# Patient Record
Sex: MALE | Race: WHITE | NOT HISPANIC OR LATINO | Employment: OTHER | URBAN - METROPOLITAN AREA
[De-identification: names, ages, dates, MRNs, and addresses within clinical notes are randomized per-mention and may not be internally consistent; named-entity substitution may affect disease eponyms.]

---

## 2017-05-04 ENCOUNTER — ALLSCRIPTS OFFICE VISIT (OUTPATIENT)
Dept: OTHER | Facility: OTHER | Age: 72
End: 2017-05-04

## 2017-10-17 ENCOUNTER — ALLSCRIPTS OFFICE VISIT (OUTPATIENT)
Dept: OTHER | Facility: OTHER | Age: 72
End: 2017-10-17

## 2017-10-17 DIAGNOSIS — I42.9 CARDIOMYOPATHY (HCC): ICD-10-CM

## 2017-10-17 DIAGNOSIS — I10 ESSENTIAL (PRIMARY) HYPERTENSION: ICD-10-CM

## 2017-10-25 ENCOUNTER — TRANSCRIBE ORDERS (OUTPATIENT)
Dept: LAB | Facility: CLINIC | Age: 72
End: 2017-10-25

## 2017-10-25 ENCOUNTER — LAB (OUTPATIENT)
Dept: LAB | Facility: CLINIC | Age: 72
End: 2017-10-25
Payer: MEDICARE

## 2017-10-25 DIAGNOSIS — I42.9 CARDIOMYOPATHY (HCC): ICD-10-CM

## 2017-10-25 DIAGNOSIS — I10 ESSENTIAL (PRIMARY) HYPERTENSION: ICD-10-CM

## 2017-10-25 DIAGNOSIS — R73.01 IMPAIRED FASTING GLUCOSE: ICD-10-CM

## 2017-10-25 DIAGNOSIS — E78.2 MIXED HYPERLIPIDEMIA: Primary | ICD-10-CM

## 2017-10-25 DIAGNOSIS — D50.0 BLOOD LOSS ANEMIA: ICD-10-CM

## 2017-10-25 LAB
ALBUMIN SERPL BCP-MCNC: 4 G/DL (ref 3.5–5)
ALP SERPL-CCNC: 69 U/L (ref 46–116)
ALT SERPL W P-5'-P-CCNC: 18 U/L (ref 12–78)
ANION GAP SERPL CALCULATED.3IONS-SCNC: 10 MMOL/L (ref 4–13)
AST SERPL W P-5'-P-CCNC: 16 U/L (ref 5–45)
BASOPHILS # BLD AUTO: 0.03 THOUSANDS/ΜL (ref 0–0.1)
BASOPHILS NFR BLD AUTO: 1 % (ref 0–1)
BILIRUB SERPL-MCNC: 0.7 MG/DL (ref 0.2–1)
BUN SERPL-MCNC: 15 MG/DL (ref 5–25)
CALCIUM SERPL-MCNC: 9 MG/DL (ref 8.3–10.1)
CHLORIDE SERPL-SCNC: 102 MMOL/L (ref 100–108)
CHOLEST SERPL-MCNC: 198 MG/DL (ref 50–200)
CO2 SERPL-SCNC: 28 MMOL/L (ref 21–32)
CREAT SERPL-MCNC: 1.03 MG/DL (ref 0.6–1.3)
EOSINOPHIL # BLD AUTO: 0.28 THOUSAND/ΜL (ref 0–0.61)
EOSINOPHIL NFR BLD AUTO: 6 % (ref 0–6)
ERYTHROCYTE [DISTWIDTH] IN BLOOD BY AUTOMATED COUNT: 14.1 % (ref 11.6–15.1)
EST. AVERAGE GLUCOSE BLD GHB EST-MCNC: 114 MG/DL
GFR SERPL CREATININE-BSD FRML MDRD: 73 ML/MIN/1.73SQ M
GLUCOSE P FAST SERPL-MCNC: 113 MG/DL (ref 65–99)
HBA1C MFR BLD: 5.6 % (ref 4.2–6.3)
HCT VFR BLD AUTO: 38.4 % (ref 36.5–49.3)
HDLC SERPL-MCNC: 101 MG/DL (ref 40–60)
HGB BLD-MCNC: 13 G/DL (ref 12–17)
LDLC SERPL CALC-MCNC: 80 MG/DL (ref 0–100)
LYMPHOCYTES # BLD AUTO: 1.57 THOUSANDS/ΜL (ref 0.6–4.47)
LYMPHOCYTES NFR BLD AUTO: 33 % (ref 14–44)
MCH RBC QN AUTO: 32.9 PG (ref 26.8–34.3)
MCHC RBC AUTO-ENTMCNC: 33.9 G/DL (ref 31.4–37.4)
MCV RBC AUTO: 97 FL (ref 82–98)
MONOCYTES # BLD AUTO: 0.52 THOUSAND/ΜL (ref 0.17–1.22)
MONOCYTES NFR BLD AUTO: 11 % (ref 4–12)
NEUTROPHILS # BLD AUTO: 2.36 THOUSANDS/ΜL (ref 1.85–7.62)
NEUTS SEG NFR BLD AUTO: 49 % (ref 43–75)
NRBC BLD AUTO-RTO: 0 /100 WBCS
PLATELET # BLD AUTO: 164 THOUSANDS/UL (ref 149–390)
PMV BLD AUTO: 12.2 FL (ref 8.9–12.7)
POTASSIUM SERPL-SCNC: 4.3 MMOL/L (ref 3.5–5.3)
PROT SERPL-MCNC: 7.9 G/DL (ref 6.4–8.2)
RBC # BLD AUTO: 3.95 MILLION/UL (ref 3.88–5.62)
SODIUM SERPL-SCNC: 140 MMOL/L (ref 136–145)
TRIGL SERPL-MCNC: 84 MG/DL
WBC # BLD AUTO: 4.77 THOUSAND/UL (ref 4.31–10.16)

## 2017-10-25 PROCEDURE — 80061 LIPID PANEL: CPT

## 2017-10-25 PROCEDURE — 80053 COMPREHEN METABOLIC PANEL: CPT

## 2017-10-25 PROCEDURE — 85025 COMPLETE CBC W/AUTO DIFF WBC: CPT

## 2017-10-25 PROCEDURE — 83036 HEMOGLOBIN GLYCOSYLATED A1C: CPT

## 2017-10-25 PROCEDURE — 36415 COLL VENOUS BLD VENIPUNCTURE: CPT

## 2018-01-11 NOTE — CONSULTS
I had the pleasure of evaluating your patient, Celine Glez  My full evaluation follows:      Chief Complaint  Patient is here today for f/u  Patient c/o occ  SOB, occ Dizziness  Patient is here today for follow up of chronic conditions described in HPI  History of Present Illness  Mr Abdiaziz Marks is a 81year old man with non-ischemic cardiomyopathy returns for follow up  Dyspnea improved with Entresto  Has nasal congestion  No chest pain or palpitations  Review of Systems      Cardiac: No complaints of chest pain, no palpitations, no fainiting  Skin: No complaints of nonhealing sores or skin rash  Genitourinary: No complaints of recurrent urinary tract infections, frequent urination at night, difficult urination, blood in urine, kidney stones, loss of bladder control, no kidney or prostate problems, no erectile dysfunction  Psychological: No complaints of feeling depressed, anxiety, panic attacks, or difficulty concentrating  General: No complaints of trouble sleeping, lack of energy, fatigue, appetite changes, weight changes, fever, frequent infections, or night sweats  Respiratory: shortness of breath  HEENT: No complaints of serious problems, hearing problems, nose problems, throat problems, or snoring  Gastrointestinal: No complaints of liver problems, nausea, vomiting, heartburn, constipation, bloody stools, diarrhea, problems swallowing, adbominal pain, or rectal bleeding  Hematologic: No complaints of bleeding disorders, anemia, blood clots, or excessive brusing  Neurological: No complaints of numbness, tingling, dizziness, weakness, seizures, headaches, syncope or fainting, AM fatigue, daytime sleepiness, no witnessed apnea episodes  Musculoskeletal: No complaints of arthritis, back pain, or painfull swelling  Active Problems    1  Cardiomyopathy (425 4) (I42 9)   2  Dyspnea on exertion (786 09) (R06 09)   3  Hypertension (401 9) (I10)   4   Pre-operative cardiovascular examination (V72 81) (Z01 810)    Past Medical History    · History of syncope (V15 89) (Z87 898)    The active problems and past medical history were reviewed and updated today  Surgical History    The surgical history was reviewed and updated today  Family History    · Family history of Reported Family History Of Cancer    The family history was reviewed and updated today  Social History    · Current Every Day Smoker (305 1)   · Marital History - Currently    · Stopped Drinking Alcohol  The social history was reviewed and updated today  The social history was reviewed and is unchanged  Current Meds   1  Carvedilol 6 25 MG Oral Tablet; take 1 tablet twice daily as directed; Therapy: 91LHS7572 to (Evaluate:11Jan2018)  Requested for: 08UKS3838; Last   Rx:16Jan2017 Ordered   2  Entresto 24-26 MG Oral Tablet; Take 1 tablet twice daily; Therapy: 53TCS6171 to ([phone])  Requested for: 18MLX8836; Last   MC:[id number] Ordered   3  Furosemide 20 MG Oral Tablet; Take 1 tablet daily; Therapy: 10Aug2012 to (Evaluate:12Oct2018)  Requested for: 17Oct2017 Recorded    The medication list was reviewed and updated today  Allergies    1  No Known Drug Allergies    Vitals   Recorded: 19BIH8274 04:30PM   Heart Rate 80, R Radial   Systolic 075, LUE, Sitting   Diastolic 66, LUE, Sitting   Height 5 ft 8 in   Weight 166 lb 7 oz   BMI Calculated 25 31   BSA Calculated 1 89     Physical Exam    Constitutional   General appearance: No acute distress, well appearing and well nourished  Eyes   Conjunctiva and Sclera examination: Conjunctiva pink, sclera anicteric  Ears, Nose, Mouth, and Throat - External inspection of ears and nose: Normal without deformities or discharge  Neck   Neck and thyroid: Normal, supple, trachea midline, no thyromegaly  Pulmonary   Respiratory effort: No increased work of breathing or signs of respiratory distress      Cardiovascular   Auscultation of heart: Normal rate and rhythm, normal S1 and S2, no murmurs  Carotid pulses: Normal, 2+ bilaterally  Examination of extremities for edema and/or varicosities: Normal     Chest - Chest: Normal    Abdomen   Abdomen: Non-tender and no distention  Musculoskeletal Gait and station: Normal gait  Skin - Skin and subcutaneous tissue: Normal without rashes or lesions  Skin is warm and well perfused, normal turgor  Neurologic - Speech: Normal     Psychiatric - Orientation to person, place, and time: Normal  Mood and affect: Normal       Assessment    1  Cardiomyopathy (425 4) (I42 9)   2  Hypertension (401 9) (I10)    Plan  Cardiomyopathy, Hypertension    · (1) BASIC METABOLIC PROFILE; Status:Active; Requested [id number];    Perform:Located within [facility]; Due:21Yha0185; Ordered; For:Cardiomyopathy, Hypertension; Ordered By:Sanket Heath; Hypertension    · Follow-up visit in 6 months Evaluation and Treatment  Follow-up  Status: Complete   Done: 51THL7491   Ordered; For: Hypertension; Ordered By: Jeane Hook Performed:  Due: 94GQO5347; Last Updated By: Laveta Oppenheim; 10/17/2017 4:43:23 PM    Discussion/Summary    81year old man with non-ischemic cardiomyopathy returns for follow up  Dyspnea improved with Entresto  Has nasal congestion  No chest pain or palpitations  Impression:  1  Non-ischemic cardiomyopathy - patient with NYHA class III systolic heart failure  EF 25%  Not interested in ICD  2  Hypertension - controlled  3  Tobacco usage - still smokes several cigarettes a day  Recommendations:  1  Continue current medications  2  Check basic metabolic profile  3  Follow up in 6 months  Thank you very much for allowing me to participate in the care of this patient  If you have any questions, please do not hesitate to contact me        Signatures   Electronically signed by : PARVIN Umana ; Oct 17 2017  4:44PM EST                       (Author)

## 2018-01-14 VITALS
SYSTOLIC BLOOD PRESSURE: 118 MMHG | BODY MASS INDEX: 25.23 KG/M2 | HEART RATE: 80 BPM | DIASTOLIC BLOOD PRESSURE: 66 MMHG | WEIGHT: 166.44 LBS | HEIGHT: 68 IN

## 2018-01-14 VITALS
HEIGHT: 68 IN | DIASTOLIC BLOOD PRESSURE: 60 MMHG | WEIGHT: 171.56 LBS | SYSTOLIC BLOOD PRESSURE: 118 MMHG | HEART RATE: 93 BPM | BODY MASS INDEX: 26 KG/M2

## 2018-02-19 DIAGNOSIS — I42.8 NON-ISCHEMIC CARDIOMYOPATHY (HCC): Primary | ICD-10-CM

## 2018-04-13 ENCOUNTER — TRANSCRIBE ORDERS (OUTPATIENT)
Dept: LAB | Facility: CLINIC | Age: 73
End: 2018-04-13

## 2018-04-13 ENCOUNTER — APPOINTMENT (OUTPATIENT)
Dept: LAB | Facility: CLINIC | Age: 73
End: 2018-04-13
Payer: MEDICARE

## 2018-04-13 DIAGNOSIS — R73.01 IMPAIRED FASTING GLUCOSE: ICD-10-CM

## 2018-04-13 DIAGNOSIS — E78.2 MIXED HYPERLIPIDEMIA: Primary | ICD-10-CM

## 2018-04-13 DIAGNOSIS — E04.0 GOITER, SIMPLE: ICD-10-CM

## 2018-04-13 DIAGNOSIS — D50.0 IRON DEFICIENCY ANEMIA SECONDARY TO BLOOD LOSS (CHRONIC): ICD-10-CM

## 2018-04-13 LAB
ALBUMIN SERPL BCP-MCNC: 3.8 G/DL (ref 3.5–5)
ALP SERPL-CCNC: 69 U/L (ref 46–116)
ALT SERPL W P-5'-P-CCNC: 14 U/L (ref 12–78)
ANION GAP SERPL CALCULATED.3IONS-SCNC: 4 MMOL/L (ref 4–13)
AST SERPL W P-5'-P-CCNC: 13 U/L (ref 5–45)
BASOPHILS # BLD AUTO: 0.02 THOUSANDS/ΜL (ref 0–0.1)
BASOPHILS NFR BLD AUTO: 1 % (ref 0–1)
BILIRUB SERPL-MCNC: 0.84 MG/DL (ref 0.2–1)
BUN SERPL-MCNC: 23 MG/DL (ref 5–25)
CALCIUM SERPL-MCNC: 8.8 MG/DL
CHLORIDE SERPL-SCNC: 107 MMOL/L (ref 100–108)
CHOLEST SERPL-MCNC: 180 MG/DL (ref 50–200)
CO2 SERPL-SCNC: 31 MMOL/L (ref 21–32)
CREAT SERPL-MCNC: 1.05 MG/DL (ref 0.6–1.3)
EOSINOPHIL # BLD AUTO: 0.24 THOUSAND/ΜL (ref 0–0.61)
EOSINOPHIL NFR BLD AUTO: 6 % (ref 0–6)
ERYTHROCYTE [DISTWIDTH] IN BLOOD BY AUTOMATED COUNT: 14.7 % (ref 11.6–15.1)
EST. AVERAGE GLUCOSE BLD GHB EST-MCNC: 111 MG/DL
GFR SERPL CREATININE-BSD FRML MDRD: 71 ML/MIN/1.73SQ M
GLUCOSE P FAST SERPL-MCNC: 100 MG/DL (ref 65–99)
HBA1C MFR BLD: 5.5 % (ref 4.2–6.3)
HCT VFR BLD AUTO: 35.3 % (ref 36.5–49.3)
HDLC SERPL-MCNC: 77 MG/DL (ref 40–60)
HGB BLD-MCNC: 11.7 G/DL (ref 12–17)
LDLC SERPL CALC-MCNC: 90 MG/DL (ref 0–100)
LYMPHOCYTES # BLD AUTO: 1.41 THOUSANDS/ΜL (ref 0.6–4.47)
LYMPHOCYTES NFR BLD AUTO: 37 % (ref 14–44)
MCH RBC QN AUTO: 33.1 PG (ref 26.8–34.3)
MCHC RBC AUTO-ENTMCNC: 33.1 G/DL (ref 31.4–37.4)
MCV RBC AUTO: 100 FL (ref 82–98)
MONOCYTES # BLD AUTO: 0.45 THOUSAND/ΜL (ref 0.17–1.22)
MONOCYTES NFR BLD AUTO: 12 % (ref 4–12)
NEUTROPHILS # BLD AUTO: 1.67 THOUSANDS/ΜL (ref 1.85–7.62)
NEUTS SEG NFR BLD AUTO: 44 % (ref 43–75)
NONHDLC SERPL-MCNC: 103 MG/DL
NRBC BLD AUTO-RTO: 0 /100 WBCS
PLATELET # BLD AUTO: 142 THOUSANDS/UL (ref 149–390)
PMV BLD AUTO: 11.6 FL (ref 8.9–12.7)
POTASSIUM SERPL-SCNC: 4.3 MMOL/L (ref 3.5–5.3)
PROT SERPL-MCNC: 7.6 G/DL (ref 6.4–8.2)
RBC # BLD AUTO: 3.54 MILLION/UL (ref 3.88–5.62)
SODIUM SERPL-SCNC: 142 MMOL/L (ref 136–145)
TRIGL SERPL-MCNC: 63 MG/DL
TSH SERPL DL<=0.05 MIU/L-ACNC: 2.29 UIU/ML (ref 0.36–3.74)
WBC # BLD AUTO: 3.79 THOUSAND/UL (ref 4.31–10.16)

## 2018-04-13 PROCEDURE — 85025 COMPLETE CBC W/AUTO DIFF WBC: CPT

## 2018-04-13 PROCEDURE — 80061 LIPID PANEL: CPT

## 2018-04-13 PROCEDURE — 84443 ASSAY THYROID STIM HORMONE: CPT

## 2018-04-13 PROCEDURE — 80053 COMPREHEN METABOLIC PANEL: CPT

## 2018-04-13 PROCEDURE — 36415 COLL VENOUS BLD VENIPUNCTURE: CPT

## 2018-04-13 PROCEDURE — 83036 HEMOGLOBIN GLYCOSYLATED A1C: CPT

## 2018-04-17 ENCOUNTER — OFFICE VISIT (OUTPATIENT)
Dept: CARDIOLOGY CLINIC | Facility: CLINIC | Age: 73
End: 2018-04-17
Payer: MEDICARE

## 2018-04-17 VITALS
OXYGEN SATURATION: 93 % | SYSTOLIC BLOOD PRESSURE: 122 MMHG | BODY MASS INDEX: 24.71 KG/M2 | WEIGHT: 166.8 LBS | HEIGHT: 69 IN | HEART RATE: 96 BPM | DIASTOLIC BLOOD PRESSURE: 70 MMHG

## 2018-04-17 DIAGNOSIS — R06.02 SHORTNESS OF BREATH: Primary | ICD-10-CM

## 2018-04-17 DIAGNOSIS — I42.8 NON-ISCHEMIC CARDIOMYOPATHY (HCC): ICD-10-CM

## 2018-04-17 DIAGNOSIS — I10 ESSENTIAL HYPERTENSION: ICD-10-CM

## 2018-04-17 DIAGNOSIS — I42.8 NICM (NONISCHEMIC CARDIOMYOPATHY) (HCC): ICD-10-CM

## 2018-04-17 PROCEDURE — 99214 OFFICE O/P EST MOD 30 MIN: CPT | Performed by: INTERNAL MEDICINE

## 2018-04-17 PROCEDURE — 93000 ELECTROCARDIOGRAM COMPLETE: CPT | Performed by: INTERNAL MEDICINE

## 2018-04-17 RX ORDER — CARVEDILOL 6.25 MG/1
TABLET ORAL
COMMUNITY
Start: 2018-04-12 | End: 2018-11-05 | Stop reason: SDUPTHER

## 2018-04-17 RX ORDER — FUROSEMIDE 20 MG/1
TABLET ORAL
COMMUNITY
Start: 2018-04-12 | End: 2018-11-05 | Stop reason: SDUPTHER

## 2018-04-17 NOTE — PATIENT INSTRUCTIONS
Recommendations:  1  Increase Entresto to 49/51 mg 2x/day  2  Continue remainder of medications  3  Follow up in 6 months

## 2018-04-17 NOTE — PROGRESS NOTES
Cardiology   Geeta Torres 72 y o  male MRN: [id number]        Impression:  1  Non-ischemic cardiomyopathy - patient with NYHA class III systolic heart failure  EF 25%  Not interested in ICD  2  Hypertension - controlled  3  Tobacco usage - still smokes several cigarettes a day  Recommendations:  1  Increase Entresto to 49/51 mg 2x/day  2  Continue remainder of medications  3  Follow up in 6 months  HPI: Geeta Torres is a 72y o  year old male with non-ischemic cardiomyopathy returns for follow up  Dyspnea improved with Entresto, but still occasionally dyspneic  Review of Systems   Constitutional: Negative  HENT: Negative  Eyes: Negative  Respiratory: Positive for shortness of breath  Negative for chest tightness  Cardiovascular: Negative for chest pain, palpitations and leg swelling  Gastrointestinal: Negative  Endocrine: Negative  Genitourinary: Negative  Musculoskeletal: Negative  Skin: Negative  Allergic/Immunologic: Negative  Neurological: Negative  Hematological: Negative  Psychiatric/Behavioral: Negative  All other systems reviewed and are negative  Past Medical History:   Diagnosis Date    Cardiomyopathy [address] [address]     Dyspnea on exertion     Hypertension      History reviewed  No pertinent surgical history    History   Alcohol Use No     History   Drug Use No     History   Smoking Status    Current Every Day Smoker   Smokeless Tobacco    Never Used     Comment: two a day only      Family History   Problem Relation Age of Onset    No Known Problems Mother     No Known Problems Father     Cancer Family     Heart disease Maternal Uncle        Allergies:  No Known Allergies    Medications:     Current Outpatient Prescriptions:     carvedilol (COREG) 6 25 mg tablet, , Disp: , Rfl:     furosemide (LASIX) 20 mg tablet, , Disp: , Rfl:     sacubitril-valsartan (ENTRESTO) 49-51 MG TABS, Take 1 tablet by mouth 2 (two) times a day, Disp: 180 tablet, Rfl: 3      Wt Readings from Last 3 Encounters:   04/17/18 75 7 kg (166 lb 12 8 oz)   10/17/17 75 5 kg (166 lb 7 oz)   05/04/17 77 8 kg (171 lb 8 9 oz)     Temp Readings from Last 3 Encounters:   No data found for Temp     BP Readings from Last 3 Encounters:   04/17/18 122/70   10/17/17 118/66   05/04/17 118/60     Pulse Readings from Last 3 Encounters:   04/17/18 96   10/17/17 80   05/04/17 93         Physical Exam   Constitutional: He is oriented to person, place, and time  He appears well-developed  HENT:   Head: Normocephalic  Eyes: EOM are normal    Neck: Normal range of motion  Cardiovascular: Normal rate, regular rhythm and normal heart sounds  Exam reveals no gallop and no friction rub  No murmur heard  Pulmonary/Chest: Effort normal and breath sounds normal  No respiratory distress  He has no wheezes  He has no rales  Abdominal: Soft  Musculoskeletal: Normal range of motion  Neurological: He is alert and oriented to person, place, and time  Skin: Skin is warm and dry  Psychiatric: He has a normal mood and affect           Laboratory Studies:  CMP:  Lab Results   Component Value Date     04/13/2018    K 4 3 04/13/2018     04/13/2018    CO2 31 04/13/2018    ANIONGAP 4 04/13/2018    BUN 23 04/13/2018    CREATININE 1 05 04/13/2018    GLUCOSE 93 12/06/2016    AST 13 04/13/2018    ALT 14 04/13/2018    BILITOT 0 84 04/13/2018    EGFR 71 04/13/2018       Lipid Profile:   Lab Results   Component Value Date    CHOL 180 04/13/2018     Lab Results   Component Value Date    HDL 77 (H) 04/13/2018     Lab Results   Component Value Date    LDLCALC 90 04/13/2018     Lab Results   Component Value Date    TRIG 63 04/13/2018       Cardiac testing:   EKG reviewed personally: YOVANY Alfaro PAC IVCD  Results for orders placed in visit on 11/17/14   Echo complete with contrast if indicated    Narrative Amanda [address], [address] [facility]   Phone: [phone] TRANSTHORACIC ECHOCARDIOGRAM   2D, M-MODE, DOPPLER, AND COLOR DOPPLER   Study date:  2014   Patient: Gleda Eisenmenger   MR number: [id number]   Account number: [de-identified]   : 1945   Age: 81 years   Gender: Male   Status: Outpatient   Location: [address] [place] [address]   Height: 69 in   Weight: 179 5 lb   BP: 124/ 78 mmHg   Indications: Assess left ventricular function  Diagnoses: 425 8 - CARDIOMYOPATH IN OTH DIS   Sonographer:  LENIN Harris   Primary Physician:  Daysi Lynne DO   Referring Physician:  Favio Lockett MD   Group:  [facility]   Interpreting Physician:  Mel Love DO   SUMMARY   LEFT VENTRICLE:   The ventricle was mildly dilated when indexed for BSA  Systolic function was markedly reduced  Ejection fraction was estimated to be   20 %  There was severe diffuse hypokinesis with no identifiable regional variations  Mass was definitely increased  The changes were consistent with eccentric   hypertrophy  Left ventricular diastolic function parameters were abnormal    Doppler parameters were consistent with high ventricular filling pressure  RIGHT VENTRICLE:   The size was normal    Systolic function was normal    LEFT ATRIUM:   The atrium was mildly dilated  MITRAL VALVE:   There was mild regurgitation  TRICUSPID VALVE:   TRANSTHORACIC ECHOCARDIOGRAM   Patient: Gleda Eisenmenger   MR number: [id number]    ------ Page 2   There was mild regurgitation  HISTORY: PRIOR HISTORY: Nonischemic cardiomyopathy  Risk factors: hypertension   and a history of current cigarette use (within the last month)  PROCEDURE: The study was performed in the [address] [place] [address]  This was a routine study  The transthoracic approach was used  The study   included complete 2D imaging, M-mode, complete spectral Doppler, and color   Doppler   Images were obtained from the parasternal, apical, subcostal, and   suprasternal notch acoustic windows  Image quality was adequate  LEFT VENTRICLE: The ventricle was mildly dilated when indexed for BSA  Systolic   function was markedly reduced  Ejection fraction was estimated to be 20 %  There was severe diffuse hypokinesis with no identifiable regional variations  Wall thickness was normal  Mass was definitely increased  The changes were   consistent with eccentric hypertrophy  DOPPLER: Left ventricular diastolic   function parameters were abnormal  Doppler parameters were consistent with    high   ventricular filling pressure  RIGHT VENTRICLE: The size was normal  Systolic function was normal  Wall   thickness was normal    LEFT ATRIUM: The atrium was mildly dilated  RIGHT ATRIUM: Size was normal    MITRAL VALVE: Valve structure was normal  There was normal leaflet separation  DOPPLER: The transmitral velocity was within the normal range  There was no   evidence for stenosis  There was mild regurgitation  AORTIC VALVE: The valve was trileaflet  Leaflets exhibited normal thickness    and   normal cuspal separation  DOPPLER: Transaortic velocity was within the normal   range  There was no evidence for stenosis  There was no regurgitation  TRICUSPID VALVE: The valve structure was normal  There was normal leaflet   separation  DOPPLER: The transtricuspid velocity was within the normal range  There was no evidence for stenosis  There was mild regurgitation  Pulmonary   artery systolic pressure was within the normal range  PULMONIC VALVE: Leaflets exhibited normal thickness, no calcification, and   normal cuspal separation  DOPPLER: The transpulmonic velocity was within the   normal range  There was trace regurgitation  PERICARDIUM: There was no pericardial effusion  The pericardium was normal in   appearance  AORTA: The root exhibited normal size  SYSTEMIC VEINS: IVC: The inferior vena cava was normal in size and course     Respirophasic changes were normal    TRANSTHORACIC ECHOCARDIOGRAM   Patient: Fernando Bazan   MR number: [id number]    ------ Page 3   SYSTEM MEASUREMENT TABLES   2D   %FS: 5 72 %   AV Diam: 3 46 cm   EDV(Teich): 207 4 ml   EF Biplane: 23 45 %   EF(Cube): 16 21 %   EF(Teich): 12 57 %   ESV(Cube): 218 09 ml   ESV(Teich): 181 33 ml   IVSd: 0 97 cm   LA Area: 20 73 cm2   LA Diam: 3 74 cm   LVEDV MOD A2C: 199 07 ml   LVEDV MOD A4C: 177 42 ml   LVEDV MOD BP: 188 39 ml   LVEF MOD A2C: 18 35 %   LVEF MOD A4C: 33 44 %   LVESV MOD A2C: 162 53 ml   LVESV MOD A4C: 118 09 ml   LVESV MOD BP: 144 2 ml   LVIDd: 6 38 cm   LVIDs: 6 02 cm   LVLd A2C: 9 82 cm   LVLd A4C: 9 94 cm   LVLs A2C: 9 18 cm   LVLs A4C: 8 46 cm   LVPWd: 1 02 cm   RA Area: 14 14 cm2   RV Diam: 3 09 cm   SI(Cube): 21 31 ml/m2   SI(Teich): 13 17 ml/m2   SV MOD A2C: 36 53 ml   SV MOD A4C: 59 32 ml   SV(Cube): 42 19 ml   SV(Teich): 26 08 ml   CW   TR MaxP 36 mmHg   TR Vmax: 2 47 m/s   MM   TAPSE: 2 64 cm   PW   E': 0 04 m/s   E/E': 23 83   MV A Anupam: 0 97 m/s   TRANSTHORACIC ECHOCARDIOGRAM   Patient: Fernando Bazan   MR number: [id number]    ------ Page 4   MV Dec Mercer: 5 98 m/s2   MV DecT: 144 47 ms   MV E Anupam: 0 86 m/s   MV E/A Ratio: 0 89   [facility]   Prepared and electronically signed by   Elena Mcclelland DO   Signed [id number] 63:58:91

## 2018-05-09 ENCOUNTER — APPOINTMENT (OUTPATIENT)
Dept: LAB | Facility: CLINIC | Age: 73
End: 2018-05-09
Payer: MEDICARE

## 2018-05-09 ENCOUNTER — TRANSCRIBE ORDERS (OUTPATIENT)
Dept: LAB | Facility: CLINIC | Age: 73
End: 2018-05-09

## 2018-05-09 DIAGNOSIS — D50.0 IRON DEFICIENCY ANEMIA SECONDARY TO BLOOD LOSS (CHRONIC): Primary | ICD-10-CM

## 2018-05-09 LAB
BASOPHILS # BLD AUTO: 0.02 THOUSANDS/ΜL (ref 0–0.1)
BASOPHILS NFR BLD AUTO: 1 % (ref 0–1)
EOSINOPHIL # BLD AUTO: 0.21 THOUSAND/ΜL (ref 0–0.61)
EOSINOPHIL NFR BLD AUTO: 5 % (ref 0–6)
ERYTHROCYTE [DISTWIDTH] IN BLOOD BY AUTOMATED COUNT: 14.3 % (ref 11.6–15.1)
HCT VFR BLD AUTO: 36.6 % (ref 36.5–49.3)
HGB BLD-MCNC: 11.7 G/DL (ref 12–17)
LYMPHOCYTES # BLD AUTO: 1.32 THOUSANDS/ΜL (ref 0.6–4.47)
LYMPHOCYTES NFR BLD AUTO: 32 % (ref 14–44)
MCH RBC QN AUTO: 32.5 PG (ref 26.8–34.3)
MCHC RBC AUTO-ENTMCNC: 32 G/DL (ref 31.4–37.4)
MCV RBC AUTO: 102 FL (ref 82–98)
MONOCYTES # BLD AUTO: 0.36 THOUSAND/ΜL (ref 0.17–1.22)
MONOCYTES NFR BLD AUTO: 9 % (ref 4–12)
NEUTROPHILS # BLD AUTO: 2.15 THOUSANDS/ΜL (ref 1.85–7.62)
NEUTS SEG NFR BLD AUTO: 53 % (ref 43–75)
NRBC BLD AUTO-RTO: 0 /100 WBCS
PLATELET # BLD AUTO: 151 THOUSANDS/UL (ref 149–390)
PMV BLD AUTO: 11.7 FL (ref 8.9–12.7)
RBC # BLD AUTO: 3.6 MILLION/UL (ref 3.88–5.62)
WBC # BLD AUTO: 4.07 THOUSAND/UL (ref 4.31–10.16)

## 2018-05-09 PROCEDURE — 85025 COMPLETE CBC W/AUTO DIFF WBC: CPT

## 2018-05-09 PROCEDURE — 36415 COLL VENOUS BLD VENIPUNCTURE: CPT

## 2018-10-10 ENCOUNTER — APPOINTMENT (OUTPATIENT)
Dept: LAB | Facility: CLINIC | Age: 73
End: 2018-10-10
Payer: MEDICARE

## 2018-10-10 ENCOUNTER — TRANSCRIBE ORDERS (OUTPATIENT)
Dept: LAB | Facility: CLINIC | Age: 73
End: 2018-10-10

## 2018-10-10 DIAGNOSIS — R74.02 NONSPECIFIC ELEVATION OF LEVELS OF TRANSAMINASE OR LACTIC ACID DEHYDROGENASE (LDH): Primary | ICD-10-CM

## 2018-10-10 DIAGNOSIS — D53.9 SIMPLE CHRONIC ANEMIA: ICD-10-CM

## 2018-10-10 DIAGNOSIS — E03.4 IDIOPATHIC ATROPHIC HYPOTHYROIDISM: ICD-10-CM

## 2018-10-10 DIAGNOSIS — R74.01 NONSPECIFIC ELEVATION OF LEVELS OF TRANSAMINASE OR LACTIC ACID DEHYDROGENASE (LDH): Primary | ICD-10-CM

## 2018-10-10 DIAGNOSIS — D50.0 IRON DEFICIENCY ANEMIA SECONDARY TO BLOOD LOSS (CHRONIC): ICD-10-CM

## 2018-10-10 DIAGNOSIS — D51.9 ANEMIA DUE TO VITAMIN B12 DEFICIENCY, UNSPECIFIED B12 DEFICIENCY TYPE: ICD-10-CM

## 2018-10-10 LAB
ALBUMIN SERPL BCP-MCNC: 3.8 G/DL (ref 3.5–5)
ALP SERPL-CCNC: 64 U/L (ref 46–116)
ALT SERPL W P-5'-P-CCNC: 18 U/L (ref 12–78)
ANION GAP SERPL CALCULATED.3IONS-SCNC: 1 MMOL/L (ref 4–13)
AST SERPL W P-5'-P-CCNC: 13 U/L (ref 5–45)
BASOPHILS # BLD AUTO: 0.03 THOUSANDS/ΜL (ref 0–0.1)
BASOPHILS NFR BLD AUTO: 1 % (ref 0–1)
BILIRUB SERPL-MCNC: 0.63 MG/DL (ref 0.2–1)
BUN SERPL-MCNC: 18 MG/DL (ref 5–25)
CALCIUM SERPL-MCNC: 9.1 MG/DL (ref 8.3–10.1)
CHLORIDE SERPL-SCNC: 107 MMOL/L (ref 100–108)
CO2 SERPL-SCNC: 31 MMOL/L (ref 21–32)
CREAT SERPL-MCNC: 1.18 MG/DL (ref 0.6–1.3)
EOSINOPHIL # BLD AUTO: 0.3 THOUSAND/ΜL (ref 0–0.61)
EOSINOPHIL NFR BLD AUTO: 7 % (ref 0–6)
ERYTHROCYTE [DISTWIDTH] IN BLOOD BY AUTOMATED COUNT: 14.1 % (ref 11.6–15.1)
FERRITIN SERPL-MCNC: 124 NG/ML (ref 8–388)
FOLATE SERPL-MCNC: 16.9 NG/ML (ref 3.1–17.5)
GFR SERPL CREATININE-BSD FRML MDRD: 62 ML/MIN/1.73SQ M
GLUCOSE P FAST SERPL-MCNC: 105 MG/DL (ref 65–99)
HCT VFR BLD AUTO: 38.7 % (ref 36.5–49.3)
HGB BLD-MCNC: 12.6 G/DL (ref 12–17)
IMM GRANULOCYTES # BLD AUTO: 0 THOUSAND/UL (ref 0–0.2)
IMM GRANULOCYTES NFR BLD AUTO: 0 % (ref 0–2)
LYMPHOCYTES # BLD AUTO: 1.34 THOUSANDS/ΜL (ref 0.6–4.47)
LYMPHOCYTES NFR BLD AUTO: 29 % (ref 14–44)
MCH RBC QN AUTO: 34.1 PG (ref 26.8–34.3)
MCHC RBC AUTO-ENTMCNC: 32.6 G/DL (ref 31.4–37.4)
MCV RBC AUTO: 105 FL (ref 82–98)
MONOCYTES # BLD AUTO: 0.59 THOUSAND/ΜL (ref 0.17–1.22)
MONOCYTES NFR BLD AUTO: 13 % (ref 4–12)
NEUTROPHILS # BLD AUTO: 2.31 THOUSANDS/ΜL (ref 1.85–7.62)
NEUTS SEG NFR BLD AUTO: 50 % (ref 43–75)
NRBC BLD AUTO-RTO: 0 /100 WBCS
PLATELET # BLD AUTO: 144 THOUSANDS/UL (ref 149–390)
PMV BLD AUTO: 11.5 FL (ref 8.9–12.7)
POTASSIUM SERPL-SCNC: 5.5 MMOL/L (ref 3.5–5.3)
PROT SERPL-MCNC: 7.7 G/DL (ref 6.4–8.2)
RBC # BLD AUTO: 3.7 MILLION/UL (ref 3.88–5.62)
SODIUM SERPL-SCNC: 139 MMOL/L (ref 136–145)
TSH SERPL DL<=0.05 MIU/L-ACNC: 1.9 UIU/ML (ref 0.36–3.74)
VIT B12 SERPL-MCNC: 305 PG/ML (ref 100–900)
WBC # BLD AUTO: 4.57 THOUSAND/UL (ref 4.31–10.16)

## 2018-10-10 PROCEDURE — 84443 ASSAY THYROID STIM HORMONE: CPT

## 2018-10-10 PROCEDURE — 85025 COMPLETE CBC W/AUTO DIFF WBC: CPT

## 2018-10-10 PROCEDURE — 82607 VITAMIN B-12: CPT

## 2018-10-10 PROCEDURE — 82728 ASSAY OF FERRITIN: CPT

## 2018-10-10 PROCEDURE — 82746 ASSAY OF FOLIC ACID SERUM: CPT

## 2018-10-10 PROCEDURE — 80053 COMPREHEN METABOLIC PANEL: CPT

## 2018-10-10 PROCEDURE — 36415 COLL VENOUS BLD VENIPUNCTURE: CPT

## 2018-10-15 ENCOUNTER — APPOINTMENT (OUTPATIENT)
Dept: LAB | Facility: CLINIC | Age: 73
End: 2018-10-15
Payer: MEDICARE

## 2018-10-15 ENCOUNTER — TRANSCRIBE ORDERS (OUTPATIENT)
Dept: LAB | Facility: CLINIC | Age: 73
End: 2018-10-15

## 2018-10-15 DIAGNOSIS — R74.02 NONSPECIFIC ELEVATION OF LEVELS OF TRANSAMINASE OR LACTIC ACID DEHYDROGENASE (LDH): Primary | ICD-10-CM

## 2018-10-15 DIAGNOSIS — D53.9 SIMPLE CHRONIC ANEMIA: ICD-10-CM

## 2018-10-15 DIAGNOSIS — R74.01 NONSPECIFIC ELEVATION OF LEVELS OF TRANSAMINASE OR LACTIC ACID DEHYDROGENASE (LDH): Primary | ICD-10-CM

## 2018-10-15 DIAGNOSIS — D50.0 IRON DEFICIENCY ANEMIA SECONDARY TO BLOOD LOSS (CHRONIC): ICD-10-CM

## 2018-10-15 DIAGNOSIS — I51.9 MYXEDEMA HEART DISEASE: ICD-10-CM

## 2018-10-15 DIAGNOSIS — D51.9 ANEMIA DUE TO VITAMIN B12 DEFICIENCY, UNSPECIFIED B12 DEFICIENCY TYPE: ICD-10-CM

## 2018-10-15 DIAGNOSIS — E03.9 MYXEDEMA HEART DISEASE: ICD-10-CM

## 2018-10-15 LAB — HEMOCCULT STL QL IA: NEGATIVE

## 2018-10-15 PROCEDURE — G0328 FECAL BLOOD SCRN IMMUNOASSAY: HCPCS

## 2018-10-16 ENCOUNTER — OFFICE VISIT (OUTPATIENT)
Dept: CARDIOLOGY CLINIC | Facility: CLINIC | Age: 73
End: 2018-10-16
Payer: MEDICARE

## 2018-10-16 VITALS
SYSTOLIC BLOOD PRESSURE: 114 MMHG | WEIGHT: 162 LBS | DIASTOLIC BLOOD PRESSURE: 68 MMHG | HEART RATE: 99 BPM | BODY MASS INDEX: 23.99 KG/M2 | OXYGEN SATURATION: 94 % | HEIGHT: 69 IN

## 2018-10-16 DIAGNOSIS — I42.8 NICM (NONISCHEMIC CARDIOMYOPATHY) (HCC): ICD-10-CM

## 2018-10-16 DIAGNOSIS — R06.00 DYSPNEA ON EXERTION: ICD-10-CM

## 2018-10-16 DIAGNOSIS — I10 ESSENTIAL HYPERTENSION: Primary | ICD-10-CM

## 2018-10-16 PROCEDURE — 99214 OFFICE O/P EST MOD 30 MIN: CPT | Performed by: INTERNAL MEDICINE

## 2018-10-16 NOTE — PROGRESS NOTES
Cardiology   Carter Pelletier 72 y o  male MRN: [id number]        IImpression:  1  Non-ischemic cardiomyopathy - patient with NYHA class III systolic heart failure  EF 25%  Not interested in ICD  2  Hypertension - controlled  3  Tobacco usage - still smokes several cigarettes a day        Recommendations: 1  Continue current medications  2  Check chest x-ray  3  Check Pulmonary function test   4  Follow up in 2 months  HPI: Carter Pelletier is a 72y o  year old male with non-ischemic cardiomyopathy returns for follow up  Continues to feel dyspneic with any exertion  Review of Systems   Constitutional: Negative  HENT: Negative  Eyes: Negative  Respiratory: Positive for shortness of breath  Negative for chest tightness  Cardiovascular: Negative for chest pain, palpitations and leg swelling  Gastrointestinal: Negative  Endocrine: Negative  Genitourinary: Negative  Musculoskeletal: Negative  Skin: Negative  Allergic/Immunologic: Negative  Neurological: Negative  Hematological: Negative  Psychiatric/Behavioral: Negative  All other systems reviewed and are negative  Past Medical History:   Diagnosis Date    [facility]omyopathy ([facility] )     Dyspnea on exertion     Essential hypertension     Non-ischemic cardiomyopathy ([facility] )      History reviewed  No pertinent surgical history    History   Alcohol Use No     History   Drug Use No     History   Smoking Status    Current Every Day Smoker   Smokeless Tobacco    Never Used     Comment: two a day only      Family History   Problem Relation Age of Onset    No Known Problems Mother     No Known Problems Father     Cancer Family     Heart disease Maternal Uncle        Allergies:  No Known Allergies    Medications:     Current Outpatient Prescriptions:     carvedilol (COREG) 6 25 mg tablet, , Disp: , Rfl:     furosemide (LASIX) 20 mg tablet, , Disp: , Rfl:     sacubitril-valsartan (ENTRESTO) 49-51 MG TABS, Take 1 tablet by mouth 2 (two) times a day, Disp: 180 tablet, Rfl: 3      Wt Readings from Last 3 Encounters:   10/16/18 73 5 kg (162 lb)   04/17/18 75 7 kg (166 lb 12 8 oz)   10/17/17 75 5 kg (166 lb 7 oz)     Temp Readings from Last 3 Encounters:   No data found for Temp     BP Readings from Last 3 Encounters:   10/16/18 114/68   04/17/18 122/70   10/17/17 118/66     Pulse Readings from Last 3 Encounters:   10/16/18 99   04/17/18 96   10/17/17 80         Physical Exam   Constitutional: He is oriented to person, place, and time  He appears well-developed  HENT:   Head: Atraumatic  Eyes: EOM are normal    Neck: Normal range of motion  Cardiovascular: Normal rate, regular rhythm and normal heart sounds  Exam reveals no gallop and no friction rub  No murmur heard  Pulmonary/Chest: Effort normal and breath sounds normal  No respiratory distress  He has no wheezes  He has no rales  Abdominal: Soft  Musculoskeletal: Normal range of motion  Neurological: He is alert and oriented to person, place, and time  Skin: Skin is warm and dry  Psychiatric: He has a normal mood and affect           Laboratory Studies:  CMP:  Lab Results   Component Value Date     10/10/2018    K 5 5 (H) 10/10/2018     10/10/2018    CO2 31 10/10/2018    ANIONGAP 3 (L) 10/22/2015    BUN 18 10/10/2018    CREATININE 1 18 10/10/2018    GLUCOSE 100 10/22/2015    AST 13 10/10/2018    ALT 18 10/10/2018    BILITOT 0 69 10/22/2015    EGFR 62 10/10/2018       Lipid Profile:   Lab Results   Component Value Date    CHOL 190 10/22/2015     Lab Results   Component Value Date    HDL 77 (H) 04/13/2018     Lab Results   Component Value Date    LDLCALC 90 04/13/2018     Lab Results   Component Value Date    TRIG 63 04/13/2018

## 2018-10-16 NOTE — PATIENT INSTRUCTIONS
Recommendations: 1  Continue current medications  2  Check chest x-ray  3  Check Pulmonary function test   4  Follow up in 2 months

## 2018-10-22 ENCOUNTER — APPOINTMENT (OUTPATIENT)
Dept: RADIOLOGY | Facility: CLINIC | Age: 73
End: 2018-10-22
Payer: MEDICARE

## 2018-10-22 ENCOUNTER — HOSPITAL ENCOUNTER (OUTPATIENT)
Dept: PULMONOLOGY | Facility: HOSPITAL | Age: 73
Discharge: HOME | End: 2018-10-22
Attending: INTERNAL MEDICINE
Payer: MEDICARE

## 2018-10-22 DIAGNOSIS — R06.00 DYSPNEA ON EXERTION: ICD-10-CM

## 2018-10-22 PROCEDURE — 94729 DIFFUSING CAPACITY: CPT | Performed by: INTERNAL MEDICINE

## 2018-10-22 PROCEDURE — 94760 N-INVAS EAR/PLS OXIMETRY 1: CPT

## 2018-10-22 PROCEDURE — 94726 PLETHYSMOGRAPHY LUNG VOLUMES: CPT

## 2018-10-22 PROCEDURE — 94729 DIFFUSING CAPACITY: CPT

## 2018-10-22 PROCEDURE — 94726 PLETHYSMOGRAPHY LUNG VOLUMES: CPT | Performed by: INTERNAL MEDICINE

## 2018-10-22 PROCEDURE — 71046 X-RAY EXAM CHEST 2 VIEWS: CPT

## 2018-10-22 PROCEDURE — 94010 BREATHING CAPACITY TEST: CPT

## 2018-10-22 PROCEDURE — 94010 BREATHING CAPACITY TEST: CPT | Performed by: INTERNAL MEDICINE

## 2018-10-30 ENCOUNTER — TELEPHONE (OUTPATIENT)
Dept: CARDIOLOGY CLINIC | Facility: CLINIC | Age: 73
End: 2018-10-30

## 2018-10-30 DIAGNOSIS — J44.9 CHRONIC OBSTRUCTIVE PULMONARY DISEASE, UNSPECIFIED COPD TYPE (HCC): Primary | ICD-10-CM

## 2018-10-30 NOTE — PROGRESS NOTES
Spoke with patient, advised as directed  Patient was then xfered to [facility] in pulmonary for scheduling with reasoning for visit

## 2018-10-30 NOTE — TELEPHONE ENCOUNTER
Spoke with patient advised as directed, call was then xfered to Josselyn Delgado with pulmonary for scheduling visit and advised of reasoning for visit with pulmonary

## 2018-11-01 ENCOUNTER — APPOINTMENT (OUTPATIENT)
Dept: LAB | Facility: CLINIC | Age: 73
End: 2018-11-01
Payer: MEDICARE

## 2018-11-01 ENCOUNTER — TRANSCRIBE ORDERS (OUTPATIENT)
Dept: LAB | Facility: CLINIC | Age: 73
End: 2018-11-01

## 2018-11-01 DIAGNOSIS — N18.2 CHRONIC KIDNEY DISEASE, STAGE II (MILD): Primary | ICD-10-CM

## 2018-11-01 LAB
ANION GAP SERPL CALCULATED.3IONS-SCNC: 3 MMOL/L (ref 4–13)
BUN SERPL-MCNC: 16 MG/DL (ref 5–25)
CALCIUM SERPL-MCNC: 8.9 MG/DL (ref 8.3–10.1)
CHLORIDE SERPL-SCNC: 104 MMOL/L (ref 100–108)
CO2 SERPL-SCNC: 30 MMOL/L (ref 21–32)
CREAT SERPL-MCNC: 1.05 MG/DL (ref 0.6–1.3)
GFR SERPL CREATININE-BSD FRML MDRD: 71 ML/MIN/1.73SQ M
GLUCOSE P FAST SERPL-MCNC: 98 MG/DL (ref 65–99)
POTASSIUM SERPL-SCNC: 4.8 MMOL/L (ref 3.5–5.3)
SODIUM SERPL-SCNC: 137 MMOL/L (ref 136–145)

## 2018-11-01 PROCEDURE — 36415 COLL VENOUS BLD VENIPUNCTURE: CPT

## 2018-11-01 PROCEDURE — 80048 BASIC METABOLIC PNL TOTAL CA: CPT

## 2018-11-05 DIAGNOSIS — I42.9 CARDIOMYOPATHY, UNSPECIFIED TYPE (HCC): ICD-10-CM

## 2018-11-05 DIAGNOSIS — I10 HYPERTENSION, UNSPECIFIED TYPE: Primary | ICD-10-CM

## 2018-11-05 RX ORDER — CARVEDILOL 6.25 MG/1
6.25 TABLET ORAL 2 TIMES DAILY WITH MEALS
Qty: 180 TABLET | Refills: 1 | Status: SHIPPED | OUTPATIENT
Start: 2018-11-05 | End: 2019-03-27 | Stop reason: SDUPTHER

## 2018-11-05 RX ORDER — FUROSEMIDE 20 MG/1
20 TABLET ORAL DAILY
Qty: 90 TABLET | Refills: 1 | Status: SHIPPED | OUTPATIENT
Start: 2018-11-05 | End: 2019-03-07 | Stop reason: SDUPTHER

## 2018-11-05 NOTE — TELEPHONE ENCOUNTER
Received fax from [facility] requesting refills for:    Carvedilol 6 25mg one tablet BID  Furosemide 20mg once daily    Verified pharmacy choice with pt      Last OV 10/16/2018, next OV 12/18/2018

## 2018-12-18 ENCOUNTER — OFFICE VISIT (OUTPATIENT)
Dept: CARDIOLOGY CLINIC | Facility: CLINIC | Age: 73
End: 2018-12-18
Payer: MEDICARE

## 2018-12-18 VITALS
DIASTOLIC BLOOD PRESSURE: 60 MMHG | HEART RATE: 87 BPM | OXYGEN SATURATION: 95 % | BODY MASS INDEX: 24.26 KG/M2 | SYSTOLIC BLOOD PRESSURE: 110 MMHG | WEIGHT: 163.8 LBS | HEIGHT: 69 IN

## 2018-12-18 DIAGNOSIS — I10 ESSENTIAL HYPERTENSION: Primary | ICD-10-CM

## 2018-12-18 DIAGNOSIS — I42.8 NICM (NONISCHEMIC CARDIOMYOPATHY) (HCC): ICD-10-CM

## 2018-12-18 DIAGNOSIS — I63.9 CEREBROVASCULAR ACCIDENT (CVA), UNSPECIFIED MECHANISM (HCC): ICD-10-CM

## 2018-12-18 PROCEDURE — 99214 OFFICE O/P EST MOD 30 MIN: CPT | Performed by: INTERNAL MEDICINE

## 2018-12-18 RX ORDER — ASPIRIN 81 MG/1
81 TABLET ORAL DAILY
Qty: 90 TABLET | Refills: 3
Start: 2018-12-18 | End: 2019-03-07 | Stop reason: SDDI

## 2018-12-18 NOTE — PROGRESS NOTES
Cardiology   Alma Rosa Edwards 72 y o  male MRN: [id number]        IImpression:  1  Non-ischemic cardiomyopathy - patient with NYHA class III systolic heart failure  EF 25%  Not interested in ICD  2  Hypertension - controlled  3  Tobacco usage - still smokes several cigarettes a day  4  COPD - scheduled to see pulmonologist    5  Word finding difficulties - concerned about possible CVA several weeks ago           Recommendations:  1  Start aspirin 81mg daily  2  Start Clopidogrel 75mg daily if no evidence of intracranial hemorrhage  3  Continue remainder of medications  4  Check carotid u/s to evaluate for carotid artery disease  5  Check echocardiogram to evaluate for LV thrombus  6  Check Head CT to evaluate for CVA  7  Refer to Neurology  8  Follow up in 2 weeks  HPI: Alma Rosa Edwards is a 72y o  year old male with non-ischemic cardiomyopathy returns for follow up  Pulmonary function test demonstrated significant obstructive airflow defect  Is scheduled to see pulmonologist   For past several weeks, has had word finding difficulties  Has not relayed these symptoms to any other health care professional   He has never had these issues previously  Does feel generalized weakness  Review of Systems   Constitutional: Negative  HENT: Negative  Eyes: Negative  Respiratory: Negative for chest tightness and shortness of breath  Cardiovascular: Negative for chest pain, palpitations and leg swelling  Gastrointestinal: Negative  Endocrine: Negative  Genitourinary: Negative  Musculoskeletal: Negative  Skin: Negative  Allergic/Immunologic: Negative  Neurological: Positive for speech difficulty  Hematological: Negative  Psychiatric/Behavioral: Negative  All other systems reviewed and are negative          Past Medical History:   Diagnosis Date    Cardiomyopathy ([address] )     Dyspnea on exertion     Essential hypertension     Non-ischemic cardiomyopathy (HCC) History reviewed  No pertinent surgical history  History   Alcohol Use No     History   Drug Use No     History   Smoking Status    Current Every Day Smoker   Smokeless Tobacco    Never Used     Comment: two a day only      Family History   Problem Relation Age of Onset    No Known Problems Mother     No Known Problems Father     Cancer Family     Heart disease Maternal Uncle        Allergies:  No Known Allergies    Medications:     Current Outpatient Prescriptions:     carvedilol (COREG) 6 25 mg tablet, Take 1 tablet (6 25 mg total) by mouth 2 (two) times a day with meals, Disp: 180 tablet, Rfl: 1    furosemide (LASIX) 20 mg tablet, Take 1 tablet (20 mg total) by mouth daily, Disp: 90 tablet, Rfl: 1    sacubitril-valsartan (ENTRESTO) 49-51 MG TABS, Take 1 tablet by mouth 2 (two) times a day, Disp: 180 tablet, Rfl: 3      Wt Readings from Last 3 Encounters:   12/18/18 74 3 kg (163 lb 12 8 oz)   10/16/18 73 5 kg (162 lb)   04/17/18 75 7 kg (166 lb 12 8 oz)     Temp Readings from Last 3 Encounters:   No data found for Temp     BP Readings from Last 3 Encounters:   12/18/18 110/60   10/16/18 114/68   04/17/18 122/70     Pulse Readings from Last 3 Encounters:   12/18/18 87   10/16/18 99   04/17/18 96         Physical Exam   Constitutional: He appears well-developed  HENT:   Head: Atraumatic  Eyes: EOM are normal    Neck: Normal range of motion  Cardiovascular: Normal rate, regular rhythm and normal heart sounds  Exam reveals no gallop and no friction rub  No murmur heard  Pulmonary/Chest: Effort normal and breath sounds normal  No respiratory distress  He has no wheezes  He has no rales  Abdominal: Soft  Musculoskeletal: Normal range of motion  Neurological:   Speech difficulties  Skin: Skin is warm and dry  Psychiatric: He has a normal mood and affect           Laboratory Studies:  CMP:  Lab Results   Component Value Date     10/22/2015    K 4 8 11/01/2018     11/01/2018 CO2 30 11/01/2018    ANIONGAP 3 (L) 10/22/2015    BUN 16 11/01/2018    CREATININE 1 05 11/01/2018    GLUCOSE 100 10/22/2015    AST 13 10/10/2018    ALT 18 10/10/2018    BILITOT 0 69 10/22/2015    EGFR 71 11/01/2018       Lipid Profile:   Lab Results   Component Value Date    CHOL 190 10/22/2015     Lab Results   Component Value Date    HDL 77 (H) 04/13/2018     Lab Results   Component Value Date    LDLCALC 90 04/13/2018     Lab Results   Component Value Date    TRIG 63 04/13/2018

## 2018-12-18 NOTE — PATIENT INSTRUCTIONS
Recommendations:  1  Start aspirin 81mg daily  2  Start Clopidogrel 75mg daily if no evidence of intracranial hemorrhage  3  Continue remainder of medications  4  Check carotid u/s to evaluate for carotid artery disease  5  Check echocardiogram to evaluate for LV thrombus  6  Check Head CT to evaluate for CVA  7  Refer to neurology  8  Follow up in 2 weeks

## 2018-12-19 ENCOUNTER — TELEPHONE (OUTPATIENT)
Dept: CARDIOLOGY CLINIC | Facility: CLINIC | Age: 73
End: 2018-12-19

## 2018-12-19 ENCOUNTER — TELEPHONE (OUTPATIENT)
Dept: NEUROLOGY | Facility: CLINIC | Age: 73
End: 2018-12-19

## 2018-12-19 ENCOUNTER — HOSPITAL ENCOUNTER (OUTPATIENT)
Dept: RADIOLOGY | Age: 73
Discharge: HOME | End: 2018-12-19
Attending: INTERNAL MEDICINE
Payer: MEDICARE

## 2018-12-19 DIAGNOSIS — I63.9 CEREBROVASCULAR ACCIDENT (CVA), UNSPECIFIED MECHANISM (HCC): ICD-10-CM

## 2018-12-19 PROCEDURE — 70450 CT HEAD/BRAIN W/O DYE: CPT

## 2018-12-19 NOTE — TELEPHONE ENCOUNTER
Spoke with Dr Yarelis No and he wants CT without contrast  Called and advised Karen Capone at [facility]   Verbally understood

## 2019-01-11 ENCOUNTER — TELEPHONE (OUTPATIENT)
Dept: NEUROLOGY | Facility: CLINIC | Age: 74
End: 2019-01-11

## 2019-01-11 NOTE — TELEPHONE ENCOUNTER
Spoke with patient regarding their upcoming appt on 2/21/19  I let the patient know that we needed to reschedule their appt due to Dr Reyes Buhl being unavailable that day  We rescheduled for 3/21/19 at 1:30

## 2019-01-14 ENCOUNTER — OFFICE VISIT (OUTPATIENT)
Dept: PULMONOLOGY | Facility: CLINIC | Age: 74
End: 2019-01-14
Payer: MEDICARE

## 2019-01-14 VITALS
DIASTOLIC BLOOD PRESSURE: 78 MMHG | SYSTOLIC BLOOD PRESSURE: 126 MMHG | TEMPERATURE: 97.6 F | HEART RATE: 86 BPM | BODY MASS INDEX: 25.07 KG/M2 | WEIGHT: 165.4 LBS | OXYGEN SATURATION: 98 % | HEIGHT: 68 IN

## 2019-01-14 DIAGNOSIS — J44.9 COPD, SEVERE (HCC): ICD-10-CM

## 2019-01-14 DIAGNOSIS — I42.8 NICM (NONISCHEMIC CARDIOMYOPATHY) (HCC): ICD-10-CM

## 2019-01-14 DIAGNOSIS — R93.89 ABNORMAL CXR: ICD-10-CM

## 2019-01-14 DIAGNOSIS — J44.9 CHRONIC OBSTRUCTIVE PULMONARY DISEASE, UNSPECIFIED COPD TYPE (HCC): ICD-10-CM

## 2019-01-14 DIAGNOSIS — R06.00 DYSPNEA ON EXERTION: Primary | ICD-10-CM

## 2019-01-14 DIAGNOSIS — Z72.0 TOBACCO ABUSE: ICD-10-CM

## 2019-01-14 PROBLEM — R06.09 DYSPNEA ON EXERTION: Status: ACTIVE | Noted: 2019-01-14

## 2019-01-14 PROCEDURE — 99205 OFFICE O/P NEW HI 60 MIN: CPT | Performed by: INTERNAL MEDICINE

## 2019-01-14 PROCEDURE — 99406 BEHAV CHNG SMOKING 3-10 MIN: CPT | Performed by: INTERNAL MEDICINE

## 2019-01-14 RX ORDER — ALBUTEROL SULFATE 90 UG/1
2 AEROSOL, METERED RESPIRATORY (INHALATION) EVERY 6 HOURS PRN
Qty: 18 G | Refills: 0 | Status: SHIPPED | OUTPATIENT
Start: 2019-01-14 | End: 2020-03-09 | Stop reason: SDUPTHER

## 2019-01-14 NOTE — LETTER
January 14, 2019     Nandini Licea MD  [address] [address]    Patient: Steven Lawson   YOB: 1945   Date of Visit: 1/14/2019       Dear Dr Chanelle Hercules: Thank you for referring Shreya Gonzáles to me for evaluation  Below are my notes for this consultation  If you have questions, please do not hesitate to call me  I look forward to following your patient along with you  Sincerely,        Dominic Tran DO        CC: No Recipients  Dominic Tran DO  1/14/2019  1:53 PM  Sign at close encounter  Pulmonary Consultation   Steven Lawson 72 y o  male MRN: [id number]  1/14/2019      Reason for Consultation:  Dyspnea, COPD evaluation    Requesting Physician: Dr Herrera Situ:  1  Dyspnea on Exertion  · I suspect this to be multifactorial in origin including severe COPD, NICM, and deconditioning  · No evidence of anemia or exertional hypoxia  · Will monitor progress and consider pulmonary rehab at next visit    2  Severe COPD  · GOLD Stage III, Class C - FEV1 1 06L 35% 10/2018  · Evidence of air trapping on PFTs  · Start Trial of Incruse Ellipta - samples given and proper administration reviewed, logged into sample registry  · PRN Ventolin use also reviewed  · We discussed diagnosis of COPD/Emphysema and COPD instruction manual provided to the patient  · He reports he has obtained Flu vaccine this season as well as "both pneumonia shots" from his PCP  · Follow up in 3 months or sooner as needed    3  Abnormal CXR  · Multiple risk factors for malignancy and pleural disease  · Will check NC CT Chest now    4   Active tobacco abuse  · Discussed need for COMPLETE TOBACCO CESSATION  · We reviewed pharmacologic and nonpharmacologic methods for complete cessation success  · I encouraged trial of NRT with lozenges for urges after eating - coupon card provided  · Will evaluate success at next visit  · Total time counseling on tobacco cessation was 5 minutes    5  Non-ischemic Cardiomyopathy - no evidence of pulmonary edema, further care per Dr Chucky Flynn, repeat echo ordered    Plan:    Diagnoses and all orders for this visit:    Dyspnea on exertion    Chronic obstructive pulmonary disease, unspecified COPD type ([facility] [address] )  -     Ambulatory referral to Pulmonology  -     Six minute walk    COPD, severe ([facility])  -     umeclidinium bromide (INCRUSE ELLIPTA) 62 5 mcg/inh AEPB inhaler; Inhale 1 puff daily for 90 days  -     albuterol (VENTOLIN HFA) 90 mcg/act inhaler; Inhale 2 puffs every 6 (six) hours as needed for wheezing    Abnormal CXR  -     CT chest without contrast; Future    NICM (nonischemic cardiomyopathy) ([facility])    Tobacco abuse        History of Present Illness   HPI:  Skip Valdivia is a 72 y o  male presents for dyspnea and COPD evaluation by Dr Chucky Flynn  He has history of NICM EF 20% in 2014 and HTN  He denied any other significant medical history  He reports longstanding PATTEN and reports it is variable in nature  When asked, he reported having SOB more than 20 years ago  He reports he continues to perform his ADLs and EADLs but at slower pace  He can walk flight of stairs without stopping  He reports difficulty with windy days and when carrying objects  He reported intermittent orthopnea  He denied wheeze, no cough or sputum production, no pleurisy, no hemoptysis, no fevers or rigors, no GERD symptoms, no dysphagia, no aspiration events  He notes he has lost weight but could not quantify  He notes stable LE edema  He has dry skin which he attributes to lasix  He denied dysuria or difficulty urinating  He does not use any inhalers and denied wheezing or frequent respiratory infections  He reports he had previously quit smoking for a few weeks with NRT patch  He has returned to smoking for long time now but reports he smokes 2-3cig per day  He previously reported 1-1 5ppd x 50+ years        He has noted word finding difficulties for many months and had negative CT head  He has upcoming neurology appointment  He denied arm/leg weakness, no HAs  Review of Systems   Constitutional: Positive for activity change and unexpected weight change  Negative for chills, fatigue and fever  HENT: Negative for congestion, mouth sores, rhinorrhea, sinus pain, sore throat, trouble swallowing and voice change  Eyes: Negative for pain, redness and visual disturbance  Respiratory: Positive for shortness of breath  Negative for cough, chest tightness and wheezing  Cardiovascular: Positive for leg swelling  Negative for chest pain and palpitations  Gastrointestinal: Negative for abdominal distention, blood in stool, diarrhea, nausea and vomiting  Endocrine: Negative for cold intolerance and heat intolerance  Genitourinary: Negative for dysuria and hematuria  Musculoskeletal: Negative for arthralgias, joint swelling and myalgias  Skin: Positive for rash  Negative for wound  Neurological: Positive for speech difficulty  Negative for tremors, syncope, weakness, numbness and headaches  Hematological: Negative for adenopathy  Psychiatric/Behavioral: Negative for confusion and sleep disturbance  The patient is not nervous/anxious  Historical Information   Past Medical History:   Diagnosis Date    Cardiomyopathy ([facility] [address] )     Dyspnea on exertion     Essential hypertension     Non[facility]ische[facility]      History reviewed  No pertinent surgical history    Family History   Problem Relation Age of Onset    No Known Problems Mother     No Known Problems Father     Cancer Family     Heart disease Maternal Uncle        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, 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, 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[employer], coal dust, asbestos, smoke/soot    Social History: active smoker, currently 2-3 cig/day, previously 1-1 5ppd x 50+ years, no pets, no bird exposures    Meds/Allergies     Current Outpatient Prescriptions:    aspirin (ECOTRIN LOW STRENGTH) 81 mg EC tablet, Take 1 tablet (81 mg total) by mouth daily, Disp: 90 tablet, Rfl: 3    carvedilol (COREG) 6 25 mg tablet, Take 1 tablet (6 25 mg total) by mouth 2 (two) times a day with meals, Disp: 180 tablet, Rfl: 1    furosemide (LASIX) 20 mg tablet, Take 1 tablet (20 mg total) by mouth daily, Disp: 90 tablet, Rfl: 1    sacubitril-valsartan (ENTRESTO) 49-51 MG TABS, Take 1 tablet by mouth 2 (two) times a day, Disp: 180 tablet, Rfl: 3  No Known Allergies    Vitals: Blood pressure 126/78, pulse 86, temperature 97 6 °F (36 4 °C), temperature source Tympanic, height 5' 8" (1 727 m), weight 75 kg (165 lb 6 4 oz), SpO2 98 %  , Body mass index is 25 15 kg/m²  Oxygen Therapy  SpO2: 98 %  Oxygen Therapy: None (Room air)    Physical Exam  Physical Exam   Constitutional: He is oriented to person, place, and time  He appears well-developed and well-nourished  No distress  Thin elderly man, no distress   HENT:   Head: Normocephalic and atraumatic  Right Ear: External ear normal    Left Ear: External ear normal    Nose: Nose normal    Mouth/Throat: No oropharyngeal exudate  Poor dentition, nearly edentulous   Eyes: Pupils are equal, round, and reactive to light  Conjunctivae are normal  Right eye exhibits no discharge  Left eye exhibits no discharge  No scleral icterus  Neck: Neck supple  No JVD present  No tracheal deviation present  Cardiovascular: Normal rate, regular rhythm and normal heart sounds  No murmur heard  Pulmonary/Chest: Effort normal  No stridor  No respiratory distress  He has no wheezes  He has no rales  Diminished BS diffusely with prolonged expiratory phase, no wheeze, no rales, no pleural rubs   Abdominal: Soft  Bowel sounds are normal  He exhibits no distension  There is no tenderness  Musculoskeletal: He exhibits edema  He exhibits no deformity  1+ pretibial edema b/l   Lymphadenopathy:     He has no cervical adenopathy     Neurological: He is alert and oriented to person, place, and time  Skin: Skin is warm and dry  No rash noted  No clear rash, noted dry skin with some excoriations   Psychiatric: He has a normal mood and affect  His behavior is normal    Vitals reviewed  Labs: I have personally reviewed pertinent lab results  Lab Results   Component Value Date    WBC 4 57 10/10/2018    HGB 12 6 10/10/2018    HCT 38 7 10/10/2018     (H) 10/10/2018     (L) 10/10/2018     Lab Results   Component Value Date    GLUCOSE 100 10/22/2015    CALCIUM 8 9 11/01/2018     10/22/2015    K 4 8 11/01/2018    CO2 30 11/01/2018     11/01/2018    BUN 16 11/01/2018    CREATININE 1 05 11/01/2018     No results found for: IGE  Lab Results   Component Value Date    ALT 18 10/10/2018    AST 13 10/10/2018    ALKPHOS 64 10/10/2018    BILITOT 0 69 10/22/2015       Imaging and other studies: I have personally reviewed pertinent reports  and I have personally reviewed pertinent films in PACS  CXR 10/22/18 - hyperinflation, flat HD and enlarged anterior/posterior clear space, blunted right CP angle with obscured right HD and suggestion of basilar scarring, no PTX, mild volume loss on right    Pulmonary function testing:   10/22/18 - Ratio 47%, FVC 2 25L (55%), FEV1 1 06L (35%), FEV1 inc 1% post BD, TLC 88%, %, DLCO 19% - severe obstructive airflow defect with reduced VC, normal TLC with increased RV indicative of air trapping and severely reduced diffusion    01/14/19 - 6MWT on RA - total walk distance 397 meters, initial SpO2 97%, romeo SpO2 94%, at conclusion 95%, initial HR 85bpm, maximal HR 107bpm, Ruben Dyspnea Scale 0/10-->1/10    EKG, Pathology, and Other Studies: I have personally reviewed pertinent reports  TTE 2014 - EF 20%, normal RV, PASP in normal range            Ross Banda DO, [facility]

## 2019-01-14 NOTE — PATIENT INSTRUCTIONS
Start Incruse 1puff daily  Start Ventolin 2puffs every 4-6 hours as needed for shortness of breath  Please quit smoking completely    COPD (Chronic Obstructive Pulmonary Disease)   AMBULATORY CARE:   COPD (chronic obstructive pulmonary disease)  is a lung disease that makes it hard for you to breathe  COPD is usually a result of lung damage caused by years of irritation and inflammation  COPD limits air flow in your lungs  Smoking, pollution, genetics, or a history of lung infections can increase your risk for COPD  Common symptoms include the following:   · Shortness of breath     · A dry cough     · Coughing fits that bring up mucus from your lungs     · Wheezing and chest tightness  Call 911 if:   · You feel lightheaded, short of breath, and have chest pain  Seek care immediately if:   · You are confused, dizzy, or feel faint  · Your arm or leg feels warm, tender, and painful  It may look swollen and red  · You cough up blood  Contact your healthcare provider if:   · You have more shortness of breath than usual      · You need more medicine than usual to control your symptoms  · You are coughing or wheezing more than usual      · You are coughing up more mucus, or it is a different color or has a different odor  · You gain more than 3 pounds in a week  · You have a fever, a runny or stuffy nose, and a sore throat, or other cold or flu symptoms  · Your skin, lips, or nails start to turn blue  · You have swelling in your legs or ankles  · You are very tired or weak for more than a day  · You notice changes in your mood, or changes in your ability to think or concentrate  · You have questions or concerns about your condition or care  Treatment for COPD  may include medicines to help decrease swelling and inflammation in your lungs  Medicines may also help open your airways or treat and infection   You may need pulmonary rehabilitation to help you manage your symptoms and improve your quality of life  You may need extra oxygen to help you breathe easier  Help make breathing easier:   · Use pursed-lip breathing any time you feel short of breath  Take a deep breath in through your nose  Slowly breathe out through your mouth with your lips pursed for twice as long as you inhaled  You can also practice this breathing pattern while you bend, lift, climb stairs, or exercise  It slows down your breathing and helps move more air in and out of your lungs  · Do not smoke, and avoid others who smoke  Nicotine and other substances can cause lung irritation or damage and make it harder for you to breathe  Do not use e-cigarettes or smokeless tobacco  They still contain nicotine  Ask your healthcare provider for information if you currently smoke and need help to quit  For support and more information:  ¨ [employer]  Phone: 5- 395 - 732-1388  Web Address: [employer]      · Be aware of and avoid anything that makes your symptoms worse  Stay out of high altitudes and places with high humidity  Stay inside, or cover your mouth and nose with a scarf when you are outside during cold weather  Stay inside on days when air pollution or pollen counts are high  Do not use aerosol sprays such as deodorant, bug spray, and hair spray  Manage COPD and help prevent exacerbations:  COPD is a serious condition that gets worse over time  A COPD exacerbation means your symptoms suddenly get worse  It is important to prevent exacerbations  An exacerbation can cause more lung damage  COPD cannot be cured, but you can take action to feel better and prevent COPD exacerbations:  · Protect yourself from germs  Germs can get into your lungs and cause an infection  An infection in your lungs can create more mucus and make it harder to breathe  An infection can also create swelling in your airways and prevent air from getting in   You can decrease your risk for infection by doing the following: ¨ Wash your hands often with soap and water  Carry germ-killing gel with you  You can use the gel to clean your hands when soap and water are not available  ¨ Do not touch your eyes, nose, or mouth unless you have washed your hands first      ¨ Always cover your mouth when you cough  Cough into a tissue or your shirtsleeve so you do not spread germs from your hands  ¨ Try to avoid people who have a cold or the flu  If you are sick, stay away from others as much as possible  · Drink more liquids  This will help to keep your air passages moist and help you cough up mucus  Ask how much liquid to drink each day and which liquids are best for you  · Exercise daily  Exercise for at least 20 minutes each day to help increase your energy and decrease shortness of breath  Talk to your healthcare provider about the best exercise plan for you  · Ask about vaccines  Your healthcare provider may recommend that you get regular flu and pneumonia vaccines  Pneumonia can become life-threatening for a person who has COPD  Ask about other vaccines you may need  Ask your healthcare provider about the flu and pneumonia vaccines  All adults should get the flu (influenza) vaccine every year as soon as it becomes available  The pneumonia vaccine is given to adults aged 72 or older to prevent pneumococcal disease, such as pneumonia  Adults aged 23 to 59 years who are at high risk for pneumococcal disease also should get the pneumococcal vaccine  It may need to be repeated 1 or 5 years later  Pulmonary rehabilitation:  Your healthcare provider may recommend a program to help you manage your symptoms and improve your quality of life  It may include nutritional counseling and exercise, such as walking, to strengthen your lungs  Make decisions about your choices for future treatment:  Ask for information about advanced medical directives and living kemp   These documents help you decide and write down your choices for treatment and end-of-life care  It is best to complete them when you feel well and can think clearly about your wishes  The information can then be kept for future use if you are in the hospital or become very ill  Follow up with your healthcare provider as directed: You may need more tests  Your healthcare provider may refer you to a pulmonary (lung) specialist  Write down your questions so you remember to ask them during your visits  © 2017 [employer] Information is for End User's use only and may not be sold, redistributed or otherwise used for commercial purposes  All illustrations and images included in CareNotes® are the copyrighted property of A D A M , Inc  or Uday Thalia  The above information is an  only  It is not intended as medical advice for individual conditions or treatments  Talk to your doctor, nurse or pharmacist before following any medical regimen to see if it is safe and effective for you  How to Stop Smoking   AMBULATORY CARE:   You will improve your health and the health of others around you  if you stop smoking  Your risk for heart and lung disease, cancer, stroke, heart attack, and vision problems will also decrease  You can benefit from quitting no matter how long you have smoked  Prepare to stop smoking:  Nicotine is a highly addictive drug found in cigarettes  Withdrawal symptoms can happen when you stop smoking and make it hard to quit  These include anxiety, depression, irritability, trouble sleeping, and increased appetite  You increase your chances of success if you prepare to quit  · Set a quit date  [place] a date that is within the next 2 weeks  Do not pick a day that you think may be stressful or busy  Write down the day or [place] it on your calender  · Tell friends and family that you plan to quit  Explain that you may have withdrawal symptoms when you try to quit  Ask them to support you   They may be able to encourage you and help reduce your stress to make it easier for you to quit  · Make a list of your reasons for quitting  Put the list somewhere you will see it every day, such as your refrigerator  You can look at the list when you have a craving  · Remove all tobacco and nicotine products from your home, car, and workplace  Also, remove anything else that will tempt you to smoke, such as lighters, matches, or ashtrays  Clean your car, home, and places at work that smell like smoke  The smell of smoke can trigger a craving  · Identify triggers that make you want to smoke  This may include activities, feelings, or people  Also write down 1 way you can deal with each of your triggers  For example, if you want to smoke as soon as you wake up, plan another activity during this time, such as exercise  · Make a plan for how you will quit  Learn about the tools that can help you quit, such as medicine, counseling, or nicotine replacement therapy  Choose at least 2 options to help you quit  Tools to help you stop smoking:   · Counseling  from a trained healthcare provider can provide you with support and skills to quit smoking  The provider will also teach you to manage your withdrawal symptoms and cravings  You may receive counseling from one counselor, in group therapy, or through phone therapy called a quit line  · Nicotine replacement therapy (NRT)  such as nicotine patches, gum, or lozenges may help reduce your nicotine cravings  You may get these without a doctor's order  Do not use e-cigarettes or smokeless tobacco in place of cigarettes or to help you quit  They still contain nicotine  · Prescription medicines  such as nasal sprays or nicotine inhalers may help reduce your withdrawal symptoms  Other medicines may also be used to reduce your urge to smoke  Ask your healthcare provider about these medicines   You may need to start certain medicines 2 weeks before your quit date for them to work well  · Hypnosis  is a practice that helps guide you through thoughts and feelings  Hypnosis may help decrease your cravings and make you more willing to quit  · Acupuncture therapy  uses very thin needles to balance energy channels in the body  This is thought to help decrease cravings and symptoms of nicotine withdrawal      · Support groups  let you talk to others who are trying to quit or have already quit  It may be helpful to speak with others about how they quit  Manage your cravings:   · Avoid situations, people, and places that tempt you to smoke  Go to nonsmoking places, such as libraries or restaurants  Understand what tempts you and try to avoid these things  · Keep your hands busy  Hold things such as a stress ball or pen  · Put candy or toothpicks in your mouth  Keep lollipops, sugarless gum, or toothpicks with you at all times  · Do not have alcohol or caffeine  These drinks may tempt you to smoke  Drink healthy liquids such as water or juice instead  · Reward yourself when you resist your cravings  Rewards will motivate you and help you stay positive  · Do an activity that distracts you from your craving  Examples include going for a walk, exercising, or cleaning  Prevent weight gain after you quit:  You may gain a few pounds after you quit smoking  It is healthier for you to gain a few pounds than to continue to smoke  The following can help you prevent weight gain:  · Eat healthy foods  These include fruits, vegetables, whole-grain breads, low-fat dairy products, beans, lean meats, and fish  Eat healthy snacks, such as low-fat yogurt, if you get hungry between meals  · Drink water before, during, and between meals  This will make your stomach feel full and help prevent you from overeating  Ask your healthcare provider how much liquid to drink each day and which liquids are best for you  · Exercise  Take a walk or do some kind of exercise every day  Ask your healthcare provider what exercise is right for you  This may help reduce your cravings and reduce stress  For more support and information:   · Smoke[employer]  gov  Phone: 4- 609 - 392-5984  Web Address: www smokefree  [employer]  © 2017 [address] [employer]  Information is for End User's use only and may not be sold, redistributed or otherwise used for commercial purposes  All illustrations and images included in CareNotes® are the copyrighted property of A D A M , Inc  or Uday Vásquez  The above information is an  only  It is not intended as medical advice for individual conditions or treatments  Talk to your doctor, nurse or pharmacist before following any medical regimen to see if it is safe and effective for you

## 2019-01-14 NOTE — PROGRESS NOTES
Pulmonary Consultation   Milan Villarreal 72 y o  male MRN: [id number]  1/14/2019      Reason for Consultation:  Dyspnea, COPD evaluation    Requesting Physician: Dr Kristina Anne:  1  Dyspnea on Exertion  · I suspect this to be multifactorial in origin including severe COPD, NICM, and deconditioning  · No evidence of anemia or exertional hypoxia  · Will monitor progress and consider pulmonary rehab at next visit    2  Severe COPD  · GOLD Stage III, Class C - FEV1 1 06L 35% 10/2018  · Evidence of air trapping on PFTs  · Start Trial of Incruse Ellipta - samples given and proper administration reviewed, logged into sample registry  · PRN Ventolin use also reviewed  · We discussed diagnosis of COPD/Emphysema and COPD instruction manual provided to the patient  · He reports he has obtained Flu vaccine this season as well as "both pneumonia shots" from his PCP  · Follow up in 3 months or sooner as needed    3  Abnormal CXR  · Multiple risk factors for malignancy and pleural disease  · Will check NC CT Chest now    4  Active tobacco abuse  · Discussed need for COMPLETE TOBACCO CESSATION  · We reviewed pharmacologic and nonpharmacologic methods for complete cessation success  · I encouraged trial of NRT with lozenges for urges after eating - coupon card provided  · Will evaluate success at next visit  · Total time counseling on tobacco cessation was 5 minutes    5  Non-ischemic Cardiomyopathy - no evidence of pulmonary edema, further care per Dr Fermín Tubbs, repeat echo ordered    Plan:    Diagnoses and all orders for this visit:    Dyspnea on exertion    Chronic obstructive pulmonary disease, unspecified [facility]  -     Ambulatory referral to Pulmonology  -     Six minute walk    COPD, severe (HCC)  -     umeclidinium bromide (INCRUSE ELLIPTA) 62 5 mcg/inh AEPB inhaler; Inhale 1 puff daily for 90 days  -     albuterol (VENTOLIN HFA) 90 mcg/act inhaler;  Inhale 2 puffs every 6 (six) hours as needed for wheezing    Abnormal CXR  -     CT chest without contrast; Future    NICM (nonischemic cardiomyopathy) (HCC)    Tobacco abuse        History of Present Illness   HPI:  Steven Lawson is a 72 y o  male presents for dyspnea and COPD evaluation by Dr Chanelle Hercules  He has history of NICM EF 20% in 2014 and HTN  He denied any other significant medical history  He reports longstanding PATTEN and reports it is variable in nature  When asked, he reported having SOB more than 20 years ago  He reports he continues to perform his ADLs and EADLs but at slower pace  He can walk flight of stairs without stopping  He reports difficulty with windy days and when carrying objects  He reported intermittent orthopnea  He denied wheeze, no cough or sputum production, no pleurisy, no hemoptysis, no fevers or rigors, no GERD symptoms, no dysphagia, no aspiration events  He notes he has lost weight but could not quantify  He notes stable LE edema  He has dry skin which he attributes to lasix  He denied dysuria or difficulty urinating  He does not use any inhalers and denied wheezing or frequent respiratory infections  He reports he had previously quit smoking for a few weeks with NRT patch  He has returned to smoking for long time now but reports he smokes 2-3cig per day  He previously reported 1-1 5ppd x 50+ years  He has noted word finding difficulties for many months and had negative CT head  He has upcoming neurology appointment  He denied arm/leg weakness, no HAs  Review of Systems   Constitutional: Positive for activity change and unexpected weight change  Negative for chills, fatigue and fever  HENT: Negative for congestion, mouth sores, rhinorrhea, sinus pain, sore throat, trouble swallowing and voice change  Eyes: Negative for pain, redness and visual disturbance  Respiratory: Positive for shortness of breath  Negative for cough, chest tightness and wheezing  Cardiovascular: Positive for leg swelling   Negative for chest pain and palpitations  Gastrointestinal: Negative for abdominal distention, blood in stool, diarrhea, nausea and vomiting  Endocrine: Negative for cold intolerance and heat intolerance  Genitourinary: Negative for dysuria and hematuria  Musculoskeletal: Negative for arthralgias, joint swelling and myalgias  Skin: Positive for rash  Negative for wound  Neurological: Positive for speech difficulty  Negative for tremors, syncope, weakness, numbness and headaches  Hematological: Negative for adenopathy  Psychiatric/Behavioral: Negative for confusion and sleep disturbance  The patient is not nervous/anxious  Historical Information   Past Medical History:   Diagnosis Date    [facility]     Dyspnea on exertion     Essential hypertension     [facility]      History reviewed  No pertinent surgical history    Family History   Problem Relation Age of Onset    No Known Problems Mother     No Known Problems Father     Cancer Family     Heart disease Maternal Uncle        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, 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, 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[employer], [employer], asbestos, smoke/soot    Social History: active smoker, currently 2-3 cig/day, previously 1-1 5ppd x 50+ years, no pets, no bird exposures    Meds/Allergies     Current Outpatient Prescriptions:     aspirin (ECOTRIN LOW STRENGTH) 81 mg EC tablet, Take 1 tablet (81 mg total) by mouth daily, Disp: 90 tablet, Rfl: 3    carvedilol (COREG) 6 25 mg tablet, Take 1 tablet (6 25 mg total) by mouth 2 (two) times a day with meals, Disp: 180 tablet, Rfl: 1    furosemide (LASIX) 20 mg tablet, Take 1 tablet (20 mg total) by mouth daily, Disp: 90 tablet, Rfl: 1    sacubitril-valsartan (ENTRESTO) 49-51 MG TABS, Take 1 tablet by mouth 2 (two) times a day, Disp: 180 tablet, Rfl: 3  No Known Allergies    Vitals: Blood pressure 126/78, pulse 86, temperature 97 6 °F (36 4 °C), temperature source Tympanic, height 5' 8" (1 727 m), weight 75 kg (165 lb 6 4 oz), SpO2 98 %  , Body mass index is 25 15 kg/m²  Oxygen Therapy  SpO2: 98 %  Oxygen Therapy: None (Room air)    Physical Exam  Physical Exam   Constitutional: He is oriented to person, place, and time  He appears well-developed and well-nourished  No distress  Thin elderly man, no distress   HENT:   Head: Normocephalic and atraumatic  Right Ear: External ear normal    Left Ear: External ear normal    Nose: Nose normal    Mouth/Throat: No oropharyngeal exudate  Poor dentition, nearly edentulous   Eyes: Pupils are equal, round, and reactive to light  Conjunctivae are normal  Right eye exhibits no discharge  Left eye exhibits no discharge  No scleral icterus  Neck: Neck supple  No JVD present  No tracheal deviation present  Cardiovascular: Normal rate, regular rhythm and normal heart sounds  No murmur heard  Pulmonary/Chest: Effort normal  No stridor  No respiratory distress  He has no wheezes  He has no rales  Diminished BS diffusely with prolonged expiratory phase, no wheeze, no rales, no pleural rubs   Abdominal: Soft  Bowel sounds are normal  He exhibits no distension  There is no tenderness  Musculoskeletal: He exhibits edema  He exhibits no deformity  1+ pretibial edema b/l   Lymphadenopathy:     He has no cervical adenopathy  Neurological: He is alert and oriented to person, place, and time  Skin: Skin is warm and dry  No rash noted  No clear rash, noted dry skin with some excoriations   Psychiatric: He has a normal mood and affect  His behavior is normal    Vitals reviewed  Labs: I have personally reviewed pertinent lab results    Lab Results   Component Value Date    WBC 4 57 10/10/2018    HGB 12 6 10/10/2018    HCT 38 7 10/10/2018     (H) 10/10/2018     (L) 10/10/2018     Lab Results   Component Value Date    GLUCOSE 100 10/22/2015    CALCIUM 8 9 11/01/2018     10/22/2015    K 4 8 11/01/2018    CO2 30 11/01/2018     11/01/2018    BUN 16 11/01/2018    CREATININE 1 05 11/01/2018     No results found for: IGE  Lab Results   Component Value Date    ALT 18 10/10/2018    AST 13 10/10/2018    ALKPHOS 64 10/10/2018    BILITOT 0 69 10/22/2015       Imaging and other studies: I have personally reviewed pertinent reports  and I have personally reviewed pertinent films in PACS  CXR 10/22/18 - hyperinflation, flat HD and enlarged anterior/posterior clear space, blunted right CP angle with obscured right HD and suggestion of basilar scarring, no PTX, mild volume loss on right    Pulmonary function testing:   10/22/18 - Ratio 47%, FVC 2 25L (55%), FEV1 1 06L (35%), FEV1 inc 1% post BD, TLC 88%, %, DLCO 19% - severe obstructive airflow defect with reduced VC, normal TLC with increased RV indicative of air trapping and severely reduced diffusion    01/14/19 - 6MWT on RA - total walk distance 397 meters, initial SpO2 97%, romeo SpO2 94%, at conclusion 95%, initial HR 85bpm, maximal HR 107bpm, Ruben Dyspnea Scale 0/10-->1/10    EKG, Pathology, and Other Studies: I have personally reviewed pertinent reports  TTE 2014 - EF 20%, normal RV, PASP in normal range            Ross Banda DO, [facility]

## 2019-01-23 ENCOUNTER — HOSPITAL ENCOUNTER (OUTPATIENT)
Dept: NON INVASIVE DIAGNOSTICS | Facility: CLINIC | Age: 74
Discharge: HOME | End: 2019-01-23
Payer: MEDICARE

## 2019-01-23 ENCOUNTER — APPOINTMENT (OUTPATIENT)
Dept: CT IMAGING | Facility: HOSPITAL | Age: 74
End: 2019-01-23
Attending: INTERNAL MEDICINE
Payer: MEDICARE

## 2019-01-23 DIAGNOSIS — I63.9 CEREBROVASCULAR ACCIDENT (CVA), UNSPECIFIED MECHANISM (HCC): ICD-10-CM

## 2019-01-23 PROCEDURE — 93306 TTE W/DOPPLER COMPLETE: CPT

## 2019-01-23 PROCEDURE — 93306 TTE W/DOPPLER COMPLETE: CPT | Performed by: INTERNAL MEDICINE

## 2019-01-23 PROCEDURE — 93880 EXTRACRANIAL BILAT STUDY: CPT

## 2019-01-24 PROCEDURE — 93880 EXTRACRANIAL BILAT STUDY: CPT | Performed by: SURGERY

## 2019-01-25 ENCOUNTER — OFFICE VISIT (OUTPATIENT)
Dept: CARDIOLOGY CLINIC | Facility: CLINIC | Age: 74
End: 2019-01-25
Payer: MEDICARE

## 2019-01-25 VITALS
BODY MASS INDEX: 25.67 KG/M2 | DIASTOLIC BLOOD PRESSURE: 60 MMHG | HEART RATE: 104 BPM | WEIGHT: 169.4 LBS | SYSTOLIC BLOOD PRESSURE: 104 MMHG | HEIGHT: 68 IN | OXYGEN SATURATION: 99 %

## 2019-01-25 DIAGNOSIS — J44.9 COPD, SEVERE (HCC): Primary | ICD-10-CM

## 2019-01-25 DIAGNOSIS — I10 ESSENTIAL HYPERTENSION: ICD-10-CM

## 2019-01-25 DIAGNOSIS — I42.8 NICM (NONISCHEMIC CARDIOMYOPATHY) (HCC): ICD-10-CM

## 2019-01-25 DIAGNOSIS — Z72.0 TOBACCO ABUSE: ICD-10-CM

## 2019-01-25 DIAGNOSIS — R06.00 DYSPNEA ON EXERTION: ICD-10-CM

## 2019-01-25 PROCEDURE — 99214 OFFICE O/P EST MOD 30 MIN: CPT | Performed by: INTERNAL MEDICINE

## 2019-01-25 NOTE — PROGRESS NOTES
Cardiology   Audrene Bence 69 y o  male MRN: [id number]        IImpression:  1  Non-ischemic cardiomyopathy - patient with NYHA class III systolic heart failure  EF 10%  Not interested in ICD  2  Hypertension - controlled  3  Tobacco usage - still smokes several cigarettes a day  4  COPD - started on Ellipita  Appreciate Pulmonary input  5  Word finding difficulties - CT Head demonstrated no acute CVA  Stable        Recommendations:  1  Continue current medications  2  Await follow up with Neurology  3  Follow up in 3 months  HPI: Audrene Bence is a 69y o  year old male with non-ischemic cardiomyopathy returns for follow up  Pulmonary function test demonstrated significant obstructive airflow defect  Saw Pulmonology, who recommended starting Albert Barks  CT head demonstrated no CVA, and Echo demonstrated no LV thrombus  Review of Systems   Constitutional: Negative  HENT: Negative  Eyes: Negative  Respiratory: Positive for shortness of breath  Negative for chest tightness  Cardiovascular: Negative for chest pain, palpitations and leg swelling  Gastrointestinal: Negative  Endocrine: Negative  Genitourinary: Negative  Musculoskeletal: Negative  Skin: Negative  Allergic/Immunologic: Negative  Neurological: Negative  Word finding difficulties  Hematological: Negative  Psychiatric/Behavioral: Negative  All other systems reviewed and are negative          Past Medical History:   Diagnosis Date    Cardiomyopathy [facility])     CHF (congestive heart failure) (HCC)     COPD (chronic obstructive pulmonary disease) (HCC)     Dyspnea on exertion     Essential hypertension     Non-ischemic cardiomyopathy (HCC)      Past Surgical History:   Procedure Laterality Date    PILONIDAL CYST EXCISION       History   Alcohol Use    Yes     Comment: occasion     History   Drug Use No     History   Smoking Status    Former Smoker    Packs/day: 0 20    Years: 60 00    Types: Cigarettes    Quit date: 1/14/2019   Smokeless Tobacco    Never Used     Comment: Two cigarettes a day with lunch and dinner      Family History   Problem Relation Age of Onset    Breast cancer Mother     No Known Problems Father     Cancer Family     Heart disease Maternal Uncle     Breast cancer Sister        Allergies:  No Known Allergies    Medications:     Current Outpatient Prescriptions:     albuterol (VENTOLIN HFA) 90 mcg/act inhaler, Inhale 2 puffs every 6 (six) hours as needed for wheezing, Disp: 18 g, Rfl: 0    aspirin (ECOTRIN LOW STRENGTH) 81 mg EC tablet, Take 1 tablet (81 mg total) by mouth daily, Disp: 90 tablet, Rfl: 3    carvedilol (COREG) 6 25 mg tablet, Take 1 tablet (6 25 mg total) by mouth 2 (two) times a day with meals, Disp: 180 tablet, Rfl: 1    furosemide (LASIX) 20 mg tablet, Take 1 tablet (20 mg total) by mouth daily, Disp: 90 tablet, Rfl: 1    sacubitril-valsartan (ENTRESTO) 49-51 MG TABS, Take 1 tablet by mouth 2 (two) times a day, Disp: 180 tablet, Rfl: 3    umeclidinium bromide (INCRUSE ELLIPTA) 62 5 mcg/inh AEPB inhaler, Inhale 1 puff daily for 90 days, Disp: 90 each, Rfl: 0      Wt Readings from Last 3 Encounters:   01/25/19 76 8 kg (169 lb 6 4 oz)   01/14/19 75 kg (165 lb 6 4 oz)   12/18/18 74 3 kg (163 lb 12 8 oz)     Temp Readings from Last 3 Encounters:   01/14/19 97 6 °F (36 4 °C) (Tympanic)     BP Readings from Last 3 Encounters:   01/25/19 104/60   01/14/19 126/78   12/18/18 110/60     Pulse Readings from Last 3 Encounters:   01/25/19 104   01/14/19 86   12/18/18 87         Physical Exam   Constitutional: He is oriented to person, place, and time  He appears well-developed  HENT:   Head: Atraumatic  Eyes: EOM are normal    Neck: Normal range of motion  Cardiovascular: Normal rate, regular rhythm and normal heart sounds  Exam reveals no gallop and no friction rub  No murmur heard    Pulmonary/Chest: Effort normal and breath sounds normal  No respiratory distress  He has no wheezes  He has no rales  Abdominal: Soft  Musculoskeletal: Normal range of motion  Neurological: He is alert and oriented to person, place, and time  Skin: Skin is warm and dry  Psychiatric: He has a normal mood and affect  Laboratory Studies:  CMP:  Lab Results   Component Value Date     10/22/2015    K 4 8 2018     2018    CO2 30 2018    ANIONGAP 3 (L) 10/22/2015    BUN 16 2018    CREATININE 1 05 2018    GLUCOSE 100 10/22/2015    AST 13 10/10/2018    ALT 18 10/10/2018    BILITOT 0 69 10/22/2015    EGFR 71 2018       Lipid Profile:   Lab Results   Component Value Date    CHOL 190 10/22/2015     Lab Results   Component Value Date    HDL 77 (H) 2018     Lab Results   Component Value Date    LDLCALC 90 2018     Lab Results   Component Value Date    TRIG 63 2018       Cardiac testing:     Results for orders placed during the hospital encounter of 19   Echo complete with contrast if indicated    Narrative [place] [id number] [address] [place] [address]  [phone]    Transthoracic Echocardiogram  2D, M-mode, Doppler, and Color Doppler    Study date:  2019    Patient: Keturah Herrera  MR number: [id number]  Account number: [de-identified]  : 1945  Age: 69 years  Gender: Male  Status: Outpatient  Location: [address] [place] [address] and Vascular Center  Height: 68 in  Weight: 164 6 lb  BP: 110/ 60 mmHg    Indications: Assess left ventricular function  Diagnoses: R06 02 - Shortness of breath    Sonographer:  LENIN Dumont  Primary Physician:  Ally Kaminski DO  Referring Physician:  Anna Andrade MD  Group:  [id number]  Interpreting Physician:  Soniya Martínez MD    SUMMARY    LEFT VENTRICLE:  The ventricle was dilated  Systolic function was severely reduced  Ejection fraction was estimated to be 10 %    There was severe diffuse hypokinesis  Doppler parameters were consistent with restrictive physiology, indicative of decreased left ventricular diastolic compliance and/or increased left atrial pressure  Doppler parameters were consistent with high ventricular filling pressure  RIGHT VENTRICLE:  The ventricle was dilated  LEFT ATRIUM:  The atrium was dilated  RIGHT ATRIUM:  The atrium was dilated  MITRAL VALVE:  There was mild to moderate regurgitation  TRICUSPID VALVE:  There was moderate regurgitation  Estimated peak PA pressure was 51 mmHg  The findings suggest mild to moderate pulmonary hypertension  PULMONIC VALVE:  There was mild regurgitation  AORTA:  The root exhibited normal size and mild fibrocalcific change  HISTORY: PRIOR HISTORY: Cardiomyopathy, COPD, Dyspnea of exertion, CVA, Tobacco abuse    PROCEDURE: The study was performed in the [facility] and [facility]  This was a routine study  The transthoracic approach was used  The study included complete 2D imaging, M-mode, complete spectral Doppler, and color Doppler  The  heart rate was 110 bpm, at the start of the study  Images were obtained from the parasternal, apical, subcostal, and suprasternal notch acoustic windows  Image quality was adequate  LEFT VENTRICLE: The ventricle was dilated  Systolic function was severely reduced  Ejection fraction was estimated to be 10 %  There was severe diffuse hypokinesis  Wall thickness was normal  DOPPLER: There was a reduced contribution of  atrial contraction to ventricular filling, due to increased ventricular diastolic pressure or atrial contractile dysfunction  Doppler parameters were consistent with restrictive physiology, indicative of decreased left ventricular  diastolic compliance and/or increased left atrial pressure  Doppler parameters were consistent with high ventricular filling pressure  RIGHT VENTRICLE: The ventricle was dilated   Systolic function was normal  Wall thickness was normal     LEFT ATRIUM: The atrium was dilated  RIGHT ATRIUM: The atrium was dilated  MITRAL VALVE: Valve structure was normal  There was normal leaflet separation  DOPPLER: The transmitral velocity was within the normal range  There was no evidence for stenosis  There was mild to moderate regurgitation  AORTIC VALVE: The valve was trileaflet  Leaflets exhibited mild calcification, normal cuspal separation, and sclerosis  DOPPLER: Transaortic velocity was within the normal range  There was no evidence for stenosis  There was trace  regurgitation  TRICUSPID VALVE: The valve structure was normal  There was normal leaflet separation  DOPPLER: The transtricuspid velocity was within the normal range  There was no evidence for stenosis  There was moderate regurgitation  Estimated peak PA  pressure was 51 mmHg  The findings suggest mild to moderate pulmonary hypertension  PULMONIC VALVE: Leaflets exhibited normal thickness, no calcification, and normal cuspal separation  DOPPLER: The transpulmonic velocity was within the normal range  There was mild regurgitation  PERICARDIUM: There was no pericardial effusion  The pericardium was normal in appearance  AORTA: The root exhibited normal size and mild fibrocalcific change  SYSTEMIC VEINS: IVC: The inferior vena cava was normal in size   Respirophasic changes were normal     SYSTEM MEASUREMENT TABLES    2D  %FS: 9 09 %  AV Diam: 3 56 cm  EDV(Teich): 230 97 ml  EF(Cube): 24 87 %  EF(Teich): 19 48 %  ESV(Cube): 225 46 ml  ESV(Teich): 185 97 ml  IVSd: 0 8 cm  LA Area: 36 38 cm2  LA Diam: 4 67 cm  LVEDV MOD A4C: 209 89 ml  LVEF MOD A4C: 18 83 %  LVESV MOD A4C: 170 37 ml  LVIDd: 6 7 cm  LVIDs: 6 09 cm  LVLd A4C: 9 3 cm  LVLs A4C: 8 79 cm  LVPWd: 0 82 cm  RA Area: 25 5 cm2  RV Diam: 5 17 cm  SI(Cube): 39 49 ml/m2  SI(Teich): 23 81 ml/m2  SV MOD A4C: 39 51 ml  SV(Cube): 74 64 ml  SV(Teich): 45 ml    CW  TR MaxP 49 mmHg  TR Vmax: 3 29 m/s    MM  TAPSE: 2 73 rob    PW  AVC: 406 66 ms    [facility] Accredited Echocardiography Laboratory    Prepared and electronically signed by    Peyton Jones MD  Signed 23-Jan-2019 14:56:03

## 2019-01-25 NOTE — PATIENT INSTRUCTIONS
Recommendations:  1  Continue current medications  2  Await follow up with Neurology  3  Follow up in 3 months

## 2019-02-25 ENCOUNTER — TELEPHONE (OUTPATIENT)
Dept: CARDIOLOGY CLINIC | Facility: CLINIC | Age: 74
End: 2019-02-25

## 2019-02-25 NOTE — TELEPHONE ENCOUNTER
Pt called and was concerned due to leg/ankle is swelling up  He's not sure if one of the medications that Dr Banda put him on is effecting it    Please Advise     Pt is aware any symptoms pt should go ER    Thank you

## 2019-03-04 DIAGNOSIS — J44.9 COPD, SEVERE (HCC): ICD-10-CM

## 2019-03-04 RX ORDER — UMECLIDINIUM 62.5 UG/1
AEROSOL, POWDER ORAL
Qty: 90 EACH | Refills: 0 | Status: SHIPPED | OUTPATIENT
Start: 2019-03-04 | End: 2019-04-05 | Stop reason: SDUPTHER

## 2019-03-07 ENCOUNTER — OFFICE VISIT (OUTPATIENT)
Dept: CARDIOLOGY CLINIC | Facility: CLINIC | Age: 74
End: 2019-03-07
Payer: MEDICARE

## 2019-03-07 VITALS
BODY MASS INDEX: 26.02 KG/M2 | HEIGHT: 68 IN | SYSTOLIC BLOOD PRESSURE: 110 MMHG | WEIGHT: 171.7 LBS | HEART RATE: 100 BPM | OXYGEN SATURATION: 95 % | DIASTOLIC BLOOD PRESSURE: 64 MMHG

## 2019-03-07 DIAGNOSIS — J44.9 COPD, SEVERE (HCC): ICD-10-CM

## 2019-03-07 DIAGNOSIS — R06.00 DYSPNEA ON EXERTION: ICD-10-CM

## 2019-03-07 DIAGNOSIS — Z72.0 TOBACCO ABUSE: ICD-10-CM

## 2019-03-07 DIAGNOSIS — I42.9 CARDIOMYOPATHY, UNSPECIFIED TYPE (HCC): ICD-10-CM

## 2019-03-07 DIAGNOSIS — I42.8 NICM (NONISCHEMIC CARDIOMYOPATHY) (HCC): Primary | ICD-10-CM

## 2019-03-07 DIAGNOSIS — I10 ESSENTIAL HYPERTENSION: ICD-10-CM

## 2019-03-07 PROCEDURE — 93000 ELECTROCARDIOGRAM COMPLETE: CPT | Performed by: INTERNAL MEDICINE

## 2019-03-07 PROCEDURE — 99214 OFFICE O/P EST MOD 30 MIN: CPT | Performed by: INTERNAL MEDICINE

## 2019-03-07 RX ORDER — FUROSEMIDE 20 MG/1
20 TABLET ORAL DAILY
Qty: 90 TABLET | Refills: 3 | Status: SHIPPED | OUTPATIENT
Start: 2019-03-07 | End: 2019-06-13

## 2019-03-07 RX ORDER — ASPIRIN 81 MG/1
81 TABLET ORAL DAILY
COMMUNITY

## 2019-03-07 NOTE — PATIENT INSTRUCTIONS
Recommendations:  1  Recommend taking Furosemide on a daily basis  2  Continue current medications  3  Will refer to advanced heart failure for evaluation  May be more of a candidate for Palliative Care  4  Follow up in 3 months

## 2019-03-07 NOTE — PROGRESS NOTES
Cardiology   Jose Dyson 69 y o  male MRN: [id number]        IImpression:  1  Non-ischemic cardiomyopathy - patient with NYHA class III systolic heart failure  EF 10%  Not interested in ICD/BiV - even if would improve symptoms  Does not want to have any major intervention  2  Hypertension - controlled  3  Tobacco usage - still smokes several cigarettes a day  4  COPD - started on Ellipita  Appreciate Pulmonary input  5  Word finding difficulties - CT Head demonstrated no acute CVA  Stable        Recommendations:  1  Recommend taking Furosemide on a daily basis  2  Continue current medications  3  Will refer to advanced heart failure for evaluation  May be more of a candidate for Palliative Care  4  Follow up in 3 months  HPI: Jose Dyson is a 69y o  year old male with non-ischemic cardiomyopathy returns for follow up  Major complaint is significant lower extremity edema  Does have chronic dyspnea  Does not take his diuretics on a regular basis  Review of Systems   Constitutional: Negative  HENT: Negative  Eyes: Negative  Respiratory: Positive for shortness of breath  Negative for chest tightness  Cardiovascular: Positive for leg swelling  Negative for chest pain and palpitations  Gastrointestinal: Negative  Endocrine: Negative  Genitourinary: Negative  Musculoskeletal: Negative  Skin: Negative  Allergic/Immunologic: Negative  Neurological: Negative  Hematological: Negative  Psychiatric/Behavioral: Negative  All other systems reviewed and are negative          Past Medical History:   Diagnosis Date    Cardiomyopathy [facility])     CHF (congestive heart failure) (HCC)     COPD (chronic obstructive pulmonary disease) (HCC)     Dyspnea on exertion     Essential hypertension     Non-ischemic cardiomyopathy (HCC)      Past Surgical History:   Procedure Laterality Date    PILONIDAL CYST EXCISION       Social History     Substance and Sexual Activity   Alcohol Use Yes    Comment: occasion     Social History     Substance and Sexual Activity   Drug Use No     Social History     Tobacco Use   Smoking Status Former Smoker    Packs/day: 0 20    Years: 60 00    Pack years: 12 00    Types: Cigarettes    Last attempt to quit: 2019    Years since quittin 1   Smokeless Tobacco Never Used   Tobacco Comment    Two cigarettes a day with lunch and dinner      Family History   Problem Relation Age of Onset    Breast cancer Mother 55    Hypertension Father     Cancer Family     Heart disease Maternal Uncle     Coronary artery disease Maternal Uncle     Heart failure Maternal Uncle     Breast cancer Sister 48    Heart attack Neg Hx     Stroke Neg Hx     Anuerysm Neg Hx     Arrhythmia Neg Hx     Clotting disorder Neg Hx     Hyperlipidemia Neg Hx        Allergies:  No Known Allergies    Medications:     Current Outpatient Medications:     carvedilol (COREG) 6 25 mg tablet, Take 1 tablet (6 25 mg total) by mouth 2 (two) times a day with meals, Disp: 180 tablet, Rfl: 1    furosemide (LASIX) 20 mg tablet, Take 1 tablet (20 mg total) by mouth daily, Disp: 90 tablet, Rfl: 1    INCRUSE ELLIPTA 62 5 MCG/INH AEPB inhaler, INHALE 1 PUFF DAILY, Disp: 90 each, Rfl: 0    sacubitril-valsartan (ENTRESTO) 49-51 MG TABS, Take 1 tablet by mouth 2 (two) times a day, Disp: 180 tablet, Rfl: 3    albuterol (VENTOLIN HFA) 90 mcg/act inhaler, Inhale 2 puffs every 6 (six) hours as needed for wheezing (Patient not taking: Reported on 3/7/2019), Disp: 18 g, Rfl: 0    aspirin (ECOTRIN LOW STRENGTH) 81 mg EC tablet, Take 81 mg by mouth daily, Disp: , Rfl:       Wt Readings from Last 3 Encounters:   19 77 9 kg (171 lb 11 2 oz)   19 76 8 kg (169 lb 6 4 oz)   19 75 kg (165 lb 6 4 oz)     Temp Readings from Last 3 Encounters:   19 97 6 °F (36 4 °C) (Tympanic)     BP Readings from Last 3 Encounters:   19 110/64   19 104/60   19 126/78     Pulse Readings from Last 3 Encounters:   19 100   19 104   19 86         Physical Exam   Constitutional: He is oriented to person, place, and time  He appears well-developed  HENT:   Head: Atraumatic  Eyes: EOM are normal    Neck: Normal range of motion  Cardiovascular: Normal rate, regular rhythm and normal heart sounds  Exam reveals no gallop and no friction rub  No murmur heard  1+ B LE edema   Pulmonary/Chest: Effort normal and breath sounds normal  No stridor  No respiratory distress  He has no wheezes  Abdominal: Soft  Musculoskeletal: Normal range of motion  Neurological: He is alert and oriented to person, place, and time  Skin: Skin is warm and dry  Psychiatric: He has a normal mood and affect           Laboratory Studies:  CMP:  Lab Results   Component Value Date     10/22/2015    K 4 8 2018     2018    CO2 30 2018    ANIONGAP 3 (L) 10/22/2015    BUN 16 2018    CREATININE 1 05 2018    GLUCOSE 100 10/22/2015    AST 13 10/10/2018    ALT 18 10/10/2018    BILITOT 0 69 10/22/2015    EGFR 71 2018       Lipid Profile:   Lab Results   Component Value Date    CHOL 190 10/22/2015     Lab Results   Component Value Date    HDL 77 (H) 2018     Lab Results   Component Value Date    LDLCALC 90 2018     Lab Results   Component Value Date    TRIG 63 2018       Cardiac testing:   EKG reviewed personally: NSR 87 1st deg AV block PAC LBBB  Results for orders placed during the hospital encounter of 19   Echo complete with contrast if indicated    Narrative [place] [id number], [address] [facility]  [phone]    Transthoracic Echocardiogram  2D, M-mode, Doppler, and Color Doppler    Study date:  2019    Patient: Eliza Tai  MR number: [id number]  Account number: [de-identified]  : 1945  Age: 69 years  Gender: Male  Status: Outpatient  [facility]: [address] [facility]  Height: 68 in  Weight: 164 6 lb  BP: 110/ 60 mmHg    Indications: Assess left ventricular function  Diagnoses: R06 02 - Shortness of breath    Sonographer:  LENIN Breen  Primary Physician:  Donny Raymundo DO  Referring Physician:  Brandon Downing MD  Group:  [id number]  Interpreting Physician:  Amandeep Perdomo MD    SUMMARY    LEFT VENTRICLE:  The ventricle was dilated  Systolic function was severely reduced  Ejection fraction was estimated to be 10 %  There was severe diffuse hypokinesis  Doppler parameters were consistent with restrictive physiology, indicative of decreased left ventricular diastolic compliance and/or increased left atrial pressure  Doppler parameters were consistent with high ventricular filling pressure  RIGHT VENTRICLE:  The ventricle was dilated  LEFT ATRIUM:  The atrium was dilated  RIGHT ATRIUM:  The atrium was dilated  MITRAL VALVE:  There was mild to moderate regurgitation  TRICUSPID VALVE:  There was moderate regurgitation  Estimated peak PA pressure was 51 mmHg  The findings suggest mild to moderate pulmonary hypertension  PULMONIC VALVE:  There was mild regurgitation  AORTA:  The root exhibited normal size and mild fibrocalcific change  HISTORY: PRIOR HISTORY: Cardiomyopathy, COPD, Dyspnea of exertion, CVA, Tobacco abuse    PROCEDURE: The study was performed in the [facility]  This was a routine study  The transthoracic approach was used  The study included complete 2D imaging, M-mode, complete spectral Doppler, and color Doppler  The  heart rate was 110 bpm, at the start of the study  Images were obtained from the parasternal, apical, subcostal, and suprasternal notch acoustic windows  Image quality was adequate  LEFT VENTRICLE: The ventricle was dilated  Systolic function was severely reduced  Ejection fraction was estimated to be 10 %  There was severe diffuse hypokinesis   Epifanio Turner thickness was normal  DOPPLER: There was a reduced contribution of  atrial contraction to ventricular filling, due to increased ventricular diastolic pressure or atrial contractile dysfunction  Doppler parameters were consistent with restrictive physiology, indicative of decreased left ventricular  diastolic compliance and/or increased left atrial pressure  Doppler parameters were consistent with high ventricular filling pressure  RIGHT VENTRICLE: The ventricle was dilated  Systolic function was normal  Wall thickness was normal     LEFT ATRIUM: The atrium was dilated  RIGHT ATRIUM: The atrium was dilated  MITRAL VALVE: Valve structure was normal  There was normal leaflet separation  DOPPLER: The transmitral velocity was within the normal range  There was no evidence for stenosis  There was mild to moderate regurgitation  AORTIC VALVE: The valve was trileaflet  Leaflets exhibited mild calcification, normal cuspal separation, and sclerosis  DOPPLER: Transaortic velocity was within the normal range  There was no evidence for stenosis  There was trace  regurgitation  TRICUSPID VALVE: The valve structure was normal  There was normal leaflet separation  DOPPLER: The transtricuspid velocity was within the normal range  There was no evidence for stenosis  There was moderate regurgitation  Estimated peak PA  pressure was 51 mmHg  The findings suggest mild to moderate pulmonary hypertension  PULMONIC VALVE: Leaflets exhibited normal thickness, no calcification, and normal cuspal separation  DOPPLER: The transpulmonic velocity was within the normal range  There was mild regurgitation  PERICARDIUM: There was no pericardial effusion  The pericardium was normal in appearance  AORTA: The root exhibited normal size and mild fibrocalcific change  SYSTEMIC VEINS: IVC: The inferior vena cava was normal in size   Respirophasic changes were normal     SYSTEM MEASUREMENT TABLES    2D  %FS: 9 09 %  AV Diam: 3 56 cm  EDV(Teich): 230 97 ml  EF(Cube): 24 87 %  EF(Teich): 19 48 %  ESV(Cube): 225 46 ml  ESV(Teich): 185 97 ml  IVSd: 0 8 cm  LA Area: 36 38 cm2  LA Diam: 4 67 cm  LVEDV MOD A4C: 209 89 ml  LVEF MOD A4C: 18 83 %  LVESV MOD A4C: 170 37 ml  LVIDd: 6 7 cm  LVIDs: 6 09 cm  LVLd A4C: 9 3 cm  LVLs A4C: 8 79 cm  LVPWd: 0 82 cm  RA Area: 25 5 cm2  RV Diam: 5 17 cm  SI(Cube): 39 49 ml/m2  SI(Teich): 23 81 ml/m2  SV MOD A4C: 39 51 ml  SV(Cube): 74 64 ml  SV(Teich): 45 ml    CW  TR MaxP 49 mmHg  TR Vmax: 3 29 m/s    MM  TAPSE: 2 73 cm    PW  AVC: 406 66 ms    [facility]    Prepared and electronically signed by    Samuel Peng MD  Signed 2019 14:56:03

## 2019-03-21 ENCOUNTER — OFFICE VISIT (OUTPATIENT)
Dept: NEUROLOGY | Facility: CLINIC | Age: 74
End: 2019-03-21
Payer: MEDICARE

## 2019-03-21 VITALS
WEIGHT: 170.2 LBS | HEART RATE: 76 BPM | DIASTOLIC BLOOD PRESSURE: 58 MMHG | HEIGHT: 68 IN | SYSTOLIC BLOOD PRESSURE: 100 MMHG | BODY MASS INDEX: 25.79 KG/M2

## 2019-03-21 DIAGNOSIS — R47.01 APHASIA: Primary | ICD-10-CM

## 2019-03-21 DIAGNOSIS — I10 ESSENTIAL HYPERTENSION: ICD-10-CM

## 2019-03-21 PROCEDURE — 99204 OFFICE O/P NEW MOD 45 MIN: CPT | Performed by: PSYCHIATRY & NEUROLOGY

## 2019-03-21 NOTE — PATIENT INSTRUCTIONS
Altered speech:  Wolfgang Sailnas was referred for evaluation in the office as result of altered speech  He notes that his altered speech began approximately a year and a half ago, came on relatively suddenly and has been present ever since with some fluctuation depending on how fast he is trying to go and whether not he is out in public  He did not endorse any other stroke-like symptoms  He was started by the Cardiology team on aspirin with which I agree  I personally reviewed his noncontrast head CT with the neuroradiologist   There is some asymmetry on the left hand side in the temporal lobe as well as the occipital horn of the lateral ventricle with some concern for atypical changes   -to further evaluate the cause for his difficulties with speech and the asymmetric appearance of his noncontrast head CT we will request an MRI of the brain to be performed   -he should continue taking aspirin as prescribed  We will not begin a statin medication unless we were to confirm that he had an actual stroke   -he confirms that he is completely abstaining from tobacco     I will call to review the results of his MRI once it is available, and we will plan to have him back in the office to be seen in 4 months time  If he has any symptoms concerning for TIA or stroke such as sudden painless loss of vision or double vision, difficulty speaking or swallowing, vertigo/room spinning that does not quickly resolve, or weakness/numbness affecting 1 side of the face or body he should proceed by ambulance to the nearest emergency room immediately

## 2019-03-21 NOTE — PROGRESS NOTES
Patient ID: Andrea Fleming is a 69 y o  male  Assessment/Plan:    No problem-specific Assessment & Plan notes found for this encounter  Diagnoses and all orders for this visit:    Aphasia  -     MRI brain without contrast; Future    Essential hypertension         Patient Instructions   Altered speech:  Lucas Cage was referred for evaluation in the office as result of altered speech  He notes that his altered speech began approximately a year and a half ago, came on relatively suddenly and has been present ever since with some fluctuation depending on how fast he is trying to go and whether not he is out in public  He did not endorse any other stroke-like symptoms  He was started by the Cardiology team on aspirin with which I agree  I personally reviewed his noncontrast head CT with the neuroradiologist   There is some asymmetry on the left hand side in the temporal lobe as well as the occipital horn of the lateral ventricle with some concern for atypical changes   -to further evaluate the cause for his difficulties with speech and the asymmetric appearance of his noncontrast head CT we will request an MRI of the brain to be performed   -he should continue taking aspirin as prescribed  We will not begin a statin medication unless we were to confirm that he had an actual stroke   -he confirms that he is completely abstaining from tobacco     I will call to review the results of his MRI once it is available, and we will plan to have him back in the office to be seen in 4 months time  If he has any symptoms concerning for TIA or stroke such as sudden painless loss of vision or double vision, difficulty speaking or swallowing, vertigo/room spinning that does not quickly resolve, or weakness/numbness affecting 1 side of the face or body he should proceed by ambulance to the nearest emergency room immediately  Subjective:    HPI     Lucas Cage presents for evaluation with regard to abnormal speech    He reports that his abnormal speech is been present and persistent for approximately 18 months  It began when he was outside talking with someone was helping him in the yd, and he noted that his speech was slurred  He notes that his speech remains somewhat garbled and that at times he has difficulty finding his words  He notes that it is worse when he is trying to [facility] overly much, or if he is out in public  He denies any other neurologic symptoms  Notably he was not on aspirin to relatively recently started by the cardiology team   He does have a history of heart failure  He is a former smoker but has quit (as of 2 years ago he had been smoking 1-2 cigarettes a day in [place], but more recently in 2019 he quit entirely)  He confirms that he is right-handed  Past Medical History:   Diagnosis Date    Cardiomyopathy [facility])     CH[facility]est[facility]) ([facility]     COPD (chronic obstructive pulmonary disease) ([facility]     Dyspnea on exertion     Essential hypertension     Non-ischemic cardiomyopathy ([facility])        Social History     Socioeconomic History    Marital status: /Civil Union     Spouse name: None    Number of children: None    Years of education: None    Highest education level: None   Occupational History    None   Social Needs    Financial resource strain: None    Food insecurity:     Worry: None     Inability: None    Transportation needs:     Medical: None     Non-medical: None   Tobacco Use    Smoking status: Former Smoker     Packs/day: 0 20     Years: 60 00     Pack years: 12 00     Types: Cigarettes     Last attempt to quit: 2019     Years since quittin 1    Smokeless tobacco: Never Used    Tobacco comment: Two cigarettes a day with lunch and dinner    Substance and Sexual Activity    Alcohol use:  Yes     Alcohol/week: 1 2 oz     Types: 2 Cans of beer per week     Comment: occasion    Drug use: No    Sexual activity: None   Lifestyle    Physical activity:     Days per week: None     Minutes per session: None    Stress: None   Relationships    Social connections:     Talks on phone: None     Gets together: None     Attends Scientologist service: None     Active member of club or organization: None     Attends meetings of clubs or organizations: None     Relationship status: None    Intimate partner violence:     Fear of current or ex partner: None     Emotionally abused: None     Physically abused: None     Forced sexual activity: None   Other Topics Concern    None   Social History Narrative    None       Family History   Problem Relation Age of Onset    Breast cancer Mother 55    Hypertension Father     Cancer Father     Cancer Family     Heart disease Maternal Uncle     Coronary artery disease Maternal Uncle     Heart failure Maternal Uncle     Breast cancer Sister 48    Heart attack Neg Hx     Stroke Neg Hx     Anuerysm Neg Hx     Arrhythmia Neg Hx     Clotting disorder Neg Hx     Hyperlipidemia Neg Hx          Current Outpatient Medications:     albuterol (VENTOLIN HFA) 90 mcg/act inhaler, Inhale 2 puffs every 6 (six) hours as needed for wheezing, Disp: 18 g, Rfl: 0    aspirin (ECOTRIN LOW STRENGTH) 81 mg EC tablet, Take 81 mg by mouth daily, Disp: , Rfl:     carvedilol (COREG) 6 25 mg tablet, Take 1 tablet (6 25 mg total) by mouth 2 (two) times a day with meals (Patient taking differently: Take 6 25 mg by mouth 2 (two) times a day with meals 2 tablets BID), Disp: 180 tablet, Rfl: 1    furosemide (LASIX) 20 mg tablet, Take 1 tablet (20 mg total) by mouth daily (Patient taking differently: Take 20 mg by mouth daily 2 tablets once daily), Disp: 90 tablet, Rfl: 3    INCRUSE ELLIPTA 62 5 MCG/INH AEPB inhaler, INHALE 1 PUFF DAILY, Disp: 90 each, Rfl: 0    sacubitril-valsartan (ENTRESTO) 49-51 MG TABS, Take 1 tablet by mouth 2 (two) times a day (Patient taking differently: Take 1 tablet by mouth 2 (two) times a day 2 tablets BID), Disp: 180 tablet, Rfl: 3        The following portions of the patient's history were reviewed : allergies, current medications, past family history, past medical history, past social history and problem list          Objective:    Blood pressure 100/58, pulse 76, height 5' 8" (1 727 m), weight 77 2 kg (170 lb 3 2 oz)  Physical Exam    Neurological Exam    General: Awake and alert, mildly cachectic, no acute distress  Eyes: Sclerae and conjunctiva clear  ENT: Normal appearing oropharynx  Neck:Supple  Cardiovascular:Regular rate and rhythm witu no carotid bruits on ascultation Respiratory:Clear to auscultation bilaterally  GI:Soft, nondistended  Musculoskeletal:No significant bony abnormalities    Neurologic exam:  Awake and alert with appropriate mental status  Speech was minimally dysarthric  He was able to use all the cardinal sounds  Repetition was intact  Cranial nerves II-XII were symmetrically intact bilaterally; and although his tongue protrusion appeared mildly asymmetric was able to push the side of his cheek out with his tongue and that was found to be symmetric  Motor testing reveals 5/5 strength in the bilateral upper and lower extremities with no drift  Sensation is intact to light touch in the bilateral upper and lower extremities  Coordination testing is unremarkable  Gait was stable  ROS:    Review of Systems   Constitutional: Positive for chills  Negative for appetite change and fever  HENT: Negative  Negative for hearing loss, tinnitus, trouble swallowing and voice change  Sinus issues   Eyes: Negative  Negative for photophobia and pain  Respiratory: Positive for shortness of breath  Cardiovascular: Negative  Negative for palpitations  Gastrointestinal: Negative  Negative for nausea and vomiting  Endocrine: Negative  Negative for cold intolerance and heat intolerance  Genitourinary: Negative  Negative for dysuria, frequency and urgency     Musculoskeletal: Positive for back pain and neck pain  Negative for myalgias  Skin: Negative  Negative for rash  Neurological: Positive for speech difficulty (slurred speech)  Negative for dizziness, tremors, seizures, syncope, facial asymmetry, weakness, light-headedness, numbness and headaches  Hematological: Negative  Does not bruise/bleed easily  Psychiatric/Behavioral: Positive for sleep disturbance (waking up at night, increased sleepiness)  Negative for confusion and hallucinations  Mood swings  Trouble finding words         Reviewed ROS as entered by medical assistant

## 2019-03-27 DIAGNOSIS — I42.8 NON-ISCHEMIC CARDIOMYOPATHY (HCC): ICD-10-CM

## 2019-03-27 DIAGNOSIS — I10 HYPERTENSION, UNSPECIFIED TYPE: ICD-10-CM

## 2019-03-27 RX ORDER — CARVEDILOL 6.25 MG/1
6.25 TABLET ORAL 2 TIMES DAILY WITH MEALS
Qty: 180 TABLET | Refills: 3 | Status: SHIPPED | OUTPATIENT
Start: 2019-03-27 | End: 2019-05-02 | Stop reason: ALTCHOICE

## 2019-03-27 RX ORDER — SACUBITRIL AND VALSARTAN 49; 51 MG/1; MG/1
TABLET, FILM COATED ORAL
Qty: 180 TABLET | Refills: 3 | Status: SHIPPED | OUTPATIENT
Start: 2019-03-27 | End: 2019-05-02 | Stop reason: DRUGHIGH

## 2019-04-05 ENCOUNTER — HOSPITAL ENCOUNTER (OUTPATIENT)
Dept: CT IMAGING | Facility: HOSPITAL | Age: 74
Discharge: HOME | End: 2019-04-05
Attending: INTERNAL MEDICINE
Payer: MEDICARE

## 2019-04-05 DIAGNOSIS — J44.9 COPD, SEVERE (HCC): ICD-10-CM

## 2019-04-05 DIAGNOSIS — R93.89 ABNORMAL CXR: ICD-10-CM

## 2019-04-05 PROCEDURE — 71250 CT THORAX DX C-: CPT

## 2019-04-10 ENCOUNTER — APPOINTMENT (OUTPATIENT)
Dept: LAB | Facility: CLINIC | Age: 74
End: 2019-04-10
Payer: MEDICARE

## 2019-04-10 ENCOUNTER — TRANSCRIBE ORDERS (OUTPATIENT)
Dept: LAB | Facility: CLINIC | Age: 74
End: 2019-04-10

## 2019-04-10 DIAGNOSIS — D50.0 IRON DEFICIENCY ANEMIA SECONDARY TO BLOOD LOSS (CHRONIC): ICD-10-CM

## 2019-04-10 DIAGNOSIS — E78.2 MIXED HYPERLIPIDEMIA: Primary | ICD-10-CM

## 2019-04-10 DIAGNOSIS — E78.2 MIXED HYPERLIPIDEMIA: ICD-10-CM

## 2019-04-10 DIAGNOSIS — D51.9 ANEMIA DUE TO VITAMIN B12 DEFICIENCY, UNSPECIFIED B12 DEFICIENCY TYPE: ICD-10-CM

## 2019-04-10 DIAGNOSIS — R73.01 IMPAIRED FASTING GLUCOSE: ICD-10-CM

## 2019-04-10 LAB
ALBUMIN SERPL BCP-MCNC: 4 G/DL (ref 3.5–5)
ALP SERPL-CCNC: 77 U/L (ref 46–116)
ALT SERPL W P-5'-P-CCNC: 17 U/L (ref 12–78)
ANION GAP SERPL CALCULATED.3IONS-SCNC: 5 MMOL/L (ref 4–13)
AST SERPL W P-5'-P-CCNC: 14 U/L (ref 5–45)
BASOPHILS # BLD AUTO: 0.02 THOUSANDS/ΜL (ref 0–0.1)
BASOPHILS NFR BLD AUTO: 1 % (ref 0–1)
BILIRUB SERPL-MCNC: 0.66 MG/DL (ref 0.2–1)
BUN SERPL-MCNC: 18 MG/DL (ref 5–25)
CALCIUM SERPL-MCNC: 8.6 MG/DL (ref 8.3–10.1)
CHLORIDE SERPL-SCNC: 104 MMOL/L (ref 100–108)
CHOLEST SERPL-MCNC: 188 MG/DL (ref 50–200)
CO2 SERPL-SCNC: 29 MMOL/L (ref 21–32)
CREAT SERPL-MCNC: 1.15 MG/DL (ref 0.6–1.3)
EOSINOPHIL # BLD AUTO: 0.42 THOUSAND/ΜL (ref 0–0.61)
EOSINOPHIL NFR BLD AUTO: 11 % (ref 0–6)
ERYTHROCYTE [DISTWIDTH] IN BLOOD BY AUTOMATED COUNT: 13.5 % (ref 11.6–15.1)
EST. AVERAGE GLUCOSE BLD GHB EST-MCNC: 103 MG/DL
FOLATE SERPL-MCNC: 15.3 NG/ML (ref 3.1–17.5)
GFR SERPL CREATININE-BSD FRML MDRD: 63 ML/MIN/1.73SQ M
GLUCOSE P FAST SERPL-MCNC: 101 MG/DL (ref 65–99)
HBA1C MFR BLD: 5.2 % (ref 4.2–6.3)
HCT VFR BLD AUTO: 35.3 % (ref 36.5–49.3)
HDLC SERPL-MCNC: 82 MG/DL (ref 40–60)
HGB BLD-MCNC: 11.3 G/DL (ref 12–17)
IMM GRANULOCYTES # BLD AUTO: 0.01 THOUSAND/UL (ref 0–0.2)
IMM GRANULOCYTES NFR BLD AUTO: 0 % (ref 0–2)
LDLC SERPL CALC-MCNC: 92 MG/DL (ref 0–100)
LYMPHOCYTES # BLD AUTO: 1.58 THOUSANDS/ΜL (ref 0.6–4.47)
LYMPHOCYTES NFR BLD AUTO: 42 % (ref 14–44)
MCH RBC QN AUTO: 33.5 PG (ref 26.8–34.3)
MCHC RBC AUTO-ENTMCNC: 32 G/DL (ref 31.4–37.4)
MCV RBC AUTO: 105 FL (ref 82–98)
MONOCYTES # BLD AUTO: 0.38 THOUSAND/ΜL (ref 0.17–1.22)
MONOCYTES NFR BLD AUTO: 10 % (ref 4–12)
NEUTROPHILS # BLD AUTO: 1.36 THOUSANDS/ΜL (ref 1.85–7.62)
NEUTS SEG NFR BLD AUTO: 36 % (ref 43–75)
NONHDLC SERPL-MCNC: 106 MG/DL
NRBC BLD AUTO-RTO: 0 /100 WBCS
PLATELET # BLD AUTO: 134 THOUSANDS/UL (ref 149–390)
PMV BLD AUTO: 11.4 FL (ref 8.9–12.7)
POTASSIUM SERPL-SCNC: 4.7 MMOL/L (ref 3.5–5.3)
PROT SERPL-MCNC: 7.8 G/DL (ref 6.4–8.2)
RBC # BLD AUTO: 3.37 MILLION/UL (ref 3.88–5.62)
SODIUM SERPL-SCNC: 138 MMOL/L (ref 136–145)
TRIGL SERPL-MCNC: 68 MG/DL
TSH SERPL DL<=0.05 MIU/L-ACNC: 1.89 UIU/ML (ref 0.36–3.74)
VIT B12 SERPL-MCNC: 294 PG/ML (ref 100–900)
WBC # BLD AUTO: 3.77 THOUSAND/UL (ref 4.31–10.16)

## 2019-04-10 PROCEDURE — 36415 COLL VENOUS BLD VENIPUNCTURE: CPT

## 2019-04-10 PROCEDURE — 80053 COMPREHEN METABOLIC PANEL: CPT

## 2019-04-10 PROCEDURE — 82607 VITAMIN B-12: CPT

## 2019-04-10 PROCEDURE — 83036 HEMOGLOBIN GLYCOSYLATED A1C: CPT

## 2019-04-10 PROCEDURE — 84443 ASSAY THYROID STIM HORMONE: CPT

## 2019-04-10 PROCEDURE — 80061 LIPID PANEL: CPT

## 2019-04-10 PROCEDURE — 85025 COMPLETE CBC W/AUTO DIFF WBC: CPT

## 2019-04-10 PROCEDURE — 82746 ASSAY OF FOLIC ACID SERUM: CPT

## 2019-04-11 DIAGNOSIS — R91.1 LUNG NODULE: Primary | ICD-10-CM

## 2019-04-17 ENCOUNTER — HOSPITAL ENCOUNTER (OUTPATIENT)
Dept: RADIOLOGY | Age: 74
Discharge: HOME | End: 2019-04-17
Payer: MEDICARE

## 2019-04-17 DIAGNOSIS — R91.1 LUNG NODULE: ICD-10-CM

## 2019-04-17 LAB — GLUCOSE SERPL-MCNC: 103 MG/DL (ref 65–140)

## 2019-04-17 PROCEDURE — 78815 PET IMAGE W/CT SKULL-THIGH: CPT

## 2019-04-17 PROCEDURE — A9552 F18 FDG: HCPCS

## 2019-04-17 PROCEDURE — 82948 REAGENT STRIP/BLOOD GLUCOSE: CPT

## 2019-04-18 ENCOUNTER — HOSPITAL ENCOUNTER (OUTPATIENT)
Dept: MRI IMAGING | Facility: HOSPITAL | Age: 74
Discharge: HOME | End: 2019-04-18
Attending: PSYCHIATRY & NEUROLOGY
Payer: MEDICARE

## 2019-04-18 DIAGNOSIS — R47.01 APHASIA: ICD-10-CM

## 2019-04-18 PROCEDURE — 70551 MRI BRAIN STEM W/O DYE: CPT

## 2019-04-19 ENCOUNTER — OFFICE VISIT (OUTPATIENT)
Dept: PULMONOLOGY | Facility: CLINIC | Age: 74
End: 2019-04-19
Payer: MEDICARE

## 2019-04-19 VITALS
SYSTOLIC BLOOD PRESSURE: 104 MMHG | DIASTOLIC BLOOD PRESSURE: 50 MMHG | HEIGHT: 68 IN | HEART RATE: 77 BPM | OXYGEN SATURATION: 100 % | BODY MASS INDEX: 25.49 KG/M2 | WEIGHT: 168.2 LBS | TEMPERATURE: 97.3 F

## 2019-04-19 DIAGNOSIS — J44.9 COPD, SEVERE (HCC): Primary | ICD-10-CM

## 2019-04-19 DIAGNOSIS — R91.1 PULMONARY NODULE: ICD-10-CM

## 2019-04-19 DIAGNOSIS — R06.00 DYSPNEA ON EXERTION: ICD-10-CM

## 2019-04-19 DIAGNOSIS — Z72.0 TOBACCO ABUSE: ICD-10-CM

## 2019-04-19 DIAGNOSIS — R47.01 APHASIA: ICD-10-CM

## 2019-04-19 PROCEDURE — 99215 OFFICE O/P EST HI 40 MIN: CPT | Performed by: INTERNAL MEDICINE

## 2019-05-02 ENCOUNTER — OFFICE VISIT (OUTPATIENT)
Dept: CARDIOLOGY CLINIC | Facility: CLINIC | Age: 74
End: 2019-05-02
Payer: MEDICARE

## 2019-05-02 VITALS
DIASTOLIC BLOOD PRESSURE: 62 MMHG | HEART RATE: 78 BPM | SYSTOLIC BLOOD PRESSURE: 118 MMHG | WEIGHT: 169.6 LBS | BODY MASS INDEX: 25.7 KG/M2 | OXYGEN SATURATION: 99 % | HEIGHT: 68 IN

## 2019-05-02 DIAGNOSIS — I42.8 NICM (NONISCHEMIC CARDIOMYOPATHY) (HCC): ICD-10-CM

## 2019-05-02 DIAGNOSIS — I42.8 NON-ISCHEMIC CARDIOMYOPATHY (HCC): ICD-10-CM

## 2019-05-02 PROCEDURE — 99204 OFFICE O/P NEW MOD 45 MIN: CPT | Performed by: INTERNAL MEDICINE

## 2019-05-02 RX ORDER — BISOPROLOL FUMARATE 5 MG/1
2.5 TABLET ORAL EVERY 12 HOURS
Qty: 180 TABLET | Refills: 3 | Status: SHIPPED | OUTPATIENT
Start: 2019-05-02 | End: 2020-06-17

## 2019-05-10 ENCOUNTER — APPOINTMENT (OUTPATIENT)
Dept: LAB | Facility: CLINIC | Age: 74
End: 2019-05-10
Payer: MEDICARE

## 2019-05-10 ENCOUNTER — TRANSCRIBE ORDERS (OUTPATIENT)
Dept: LAB | Facility: CLINIC | Age: 74
End: 2019-05-10

## 2019-05-10 DIAGNOSIS — D64.9 ANEMIA, UNSPECIFIED TYPE: Primary | ICD-10-CM

## 2019-05-10 LAB
ANION GAP SERPL CALCULATED.3IONS-SCNC: 4 MMOL/L (ref 4–13)
BASOPHILS # BLD AUTO: 0.04 THOUSANDS/ΜL (ref 0–0.1)
BASOPHILS NFR BLD AUTO: 1 % (ref 0–1)
BUN SERPL-MCNC: 19 MG/DL (ref 5–25)
CALCIUM SERPL-MCNC: 9 MG/DL (ref 8.3–10.1)
CHLORIDE SERPL-SCNC: 105 MMOL/L (ref 100–108)
CO2 SERPL-SCNC: 30 MMOL/L (ref 21–32)
CREAT SERPL-MCNC: 1.18 MG/DL (ref 0.6–1.3)
EOSINOPHIL # BLD AUTO: 0.53 THOUSAND/ΜL (ref 0–0.61)
EOSINOPHIL NFR BLD AUTO: 13 % (ref 0–6)
ERYTHROCYTE [DISTWIDTH] IN BLOOD BY AUTOMATED COUNT: 14.2 % (ref 11.6–15.1)
GFR SERPL CREATININE-BSD FRML MDRD: 61 ML/MIN/1.73SQ M
GLUCOSE P FAST SERPL-MCNC: 107 MG/DL (ref 65–99)
HCT VFR BLD AUTO: 34.7 % (ref 36.5–49.3)
HGB BLD-MCNC: 11.2 G/DL (ref 12–17)
IMM GRANULOCYTES # BLD AUTO: 0.01 THOUSAND/UL (ref 0–0.2)
IMM GRANULOCYTES NFR BLD AUTO: 0 % (ref 0–2)
LYMPHOCYTES # BLD AUTO: 1.36 THOUSANDS/ΜL (ref 0.6–4.47)
LYMPHOCYTES NFR BLD AUTO: 33 % (ref 14–44)
MCH RBC QN AUTO: 34.5 PG (ref 26.8–34.3)
MCHC RBC AUTO-ENTMCNC: 32.3 G/DL (ref 31.4–37.4)
MCV RBC AUTO: 107 FL (ref 82–98)
MONOCYTES # BLD AUTO: 0.43 THOUSAND/ΜL (ref 0.17–1.22)
MONOCYTES NFR BLD AUTO: 10 % (ref 4–12)
NEUTROPHILS # BLD AUTO: 1.82 THOUSANDS/ΜL (ref 1.85–7.62)
NEUTS SEG NFR BLD AUTO: 43 % (ref 43–75)
NRBC BLD AUTO-RTO: 0 /100 WBCS
PLATELET # BLD AUTO: 139 THOUSANDS/UL (ref 149–390)
PMV BLD AUTO: 11.4 FL (ref 8.9–12.7)
POTASSIUM SERPL-SCNC: 5.2 MMOL/L (ref 3.5–5.3)
RBC # BLD AUTO: 3.25 MILLION/UL (ref 3.88–5.62)
SODIUM SERPL-SCNC: 139 MMOL/L (ref 136–145)
WBC # BLD AUTO: 4.19 THOUSAND/UL (ref 4.31–10.16)

## 2019-05-10 PROCEDURE — 85025 COMPLETE CBC W/AUTO DIFF WBC: CPT

## 2019-05-10 PROCEDURE — 80048 BASIC METABOLIC PNL TOTAL CA: CPT | Performed by: INTERNAL MEDICINE

## 2019-05-10 PROCEDURE — 36415 COLL VENOUS BLD VENIPUNCTURE: CPT | Performed by: INTERNAL MEDICINE

## 2019-05-14 ENCOUNTER — TELEPHONE (OUTPATIENT)
Dept: CARDIOLOGY CLINIC | Facility: CLINIC | Age: 74
End: 2019-05-14

## 2019-06-04 ENCOUNTER — TELEPHONE (OUTPATIENT)
Dept: CARDIOLOGY CLINIC | Facility: CLINIC | Age: 74
End: 2019-06-04

## 2019-06-13 ENCOUNTER — OFFICE VISIT (OUTPATIENT)
Dept: CARDIOLOGY CLINIC | Facility: CLINIC | Age: 74
End: 2019-06-13
Payer: MEDICARE

## 2019-06-13 VITALS
OXYGEN SATURATION: 97 % | BODY MASS INDEX: 26.83 KG/M2 | HEART RATE: 78 BPM | HEIGHT: 68 IN | SYSTOLIC BLOOD PRESSURE: 100 MMHG | WEIGHT: 177 LBS | DIASTOLIC BLOOD PRESSURE: 52 MMHG

## 2019-06-13 DIAGNOSIS — I42.9 CARDIOMYOPATHY, UNSPECIFIED TYPE (HCC): ICD-10-CM

## 2019-06-13 PROCEDURE — 99214 OFFICE O/P EST MOD 30 MIN: CPT | Performed by: INTERNAL MEDICINE

## 2019-06-13 RX ORDER — FUROSEMIDE 20 MG/1
TABLET ORAL
Qty: 360 TABLET | Refills: 3 | Status: SHIPPED | OUTPATIENT
Start: 2019-06-13 | End: 2020-06-17

## 2019-06-20 ENCOUNTER — TELEPHONE (OUTPATIENT)
Dept: CARDIOLOGY CLINIC | Facility: CLINIC | Age: 74
End: 2019-06-20

## 2019-06-24 ENCOUNTER — TELEPHONE (OUTPATIENT)
Dept: NEUROLOGY | Facility: CLINIC | Age: 74
End: 2019-06-24

## 2019-06-24 DIAGNOSIS — I67.2 CEREBRAL ATHEROSCLEROSIS: ICD-10-CM

## 2019-06-24 DIAGNOSIS — Z86.73 HISTORY OF STROKE: Primary | ICD-10-CM

## 2019-06-24 RX ORDER — ATORVASTATIN CALCIUM 20 MG/1
20 TABLET, FILM COATED ORAL EVERY EVENING
Qty: 30 TABLET | Refills: 3 | Status: SHIPPED | OUTPATIENT
Start: 2019-06-24 | End: 2019-10-05 | Stop reason: SDUPTHER

## 2019-06-27 ENCOUNTER — TELEPHONE (OUTPATIENT)
Dept: CARDIOLOGY CLINIC | Facility: CLINIC | Age: 74
End: 2019-06-27

## 2019-07-02 ENCOUNTER — TRANSCRIBE ORDERS (OUTPATIENT)
Dept: LAB | Facility: CLINIC | Age: 74
End: 2019-07-02

## 2019-07-02 ENCOUNTER — APPOINTMENT (OUTPATIENT)
Dept: LAB | Facility: CLINIC | Age: 74
End: 2019-07-02
Payer: MEDICARE

## 2019-07-02 DIAGNOSIS — D50.9 IRON DEFICIENCY ANEMIA, UNSPECIFIED IRON DEFICIENCY ANEMIA TYPE: Primary | ICD-10-CM

## 2019-07-02 LAB
BASOPHILS # BLD AUTO: 0.03 THOUSANDS/ΜL (ref 0–0.1)
BASOPHILS NFR BLD AUTO: 1 % (ref 0–1)
EOSINOPHIL # BLD AUTO: 0.34 THOUSAND/ΜL (ref 0–0.61)
EOSINOPHIL NFR BLD AUTO: 8 % (ref 0–6)
ERYTHROCYTE [DISTWIDTH] IN BLOOD BY AUTOMATED COUNT: 14.3 % (ref 11.6–15.1)
HCT VFR BLD AUTO: 35.6 % (ref 36.5–49.3)
HGB BLD-MCNC: 11.4 G/DL (ref 12–17)
IMM GRANULOCYTES # BLD AUTO: 0.01 THOUSAND/UL (ref 0–0.2)
IMM GRANULOCYTES NFR BLD AUTO: 0 % (ref 0–2)
LYMPHOCYTES # BLD AUTO: 1.69 THOUSANDS/ΜL (ref 0.6–4.47)
LYMPHOCYTES NFR BLD AUTO: 42 % (ref 14–44)
MCH RBC QN AUTO: 34.1 PG (ref 26.8–34.3)
MCHC RBC AUTO-ENTMCNC: 32 G/DL (ref 31.4–37.4)
MCV RBC AUTO: 107 FL (ref 82–98)
MONOCYTES # BLD AUTO: 0.4 THOUSAND/ΜL (ref 0.17–1.22)
MONOCYTES NFR BLD AUTO: 10 % (ref 4–12)
NEUTROPHILS # BLD AUTO: 1.59 THOUSANDS/ΜL (ref 1.85–7.62)
NEUTS SEG NFR BLD AUTO: 39 % (ref 43–75)
NRBC BLD AUTO-RTO: 0 /100 WBCS
PLATELET # BLD AUTO: 131 THOUSANDS/UL (ref 149–390)
PMV BLD AUTO: 11.5 FL (ref 8.9–12.7)
RBC # BLD AUTO: 3.34 MILLION/UL (ref 3.88–5.62)
WBC # BLD AUTO: 4.06 THOUSAND/UL (ref 4.31–10.16)

## 2019-07-02 PROCEDURE — 85025 COMPLETE CBC W/AUTO DIFF WBC: CPT

## 2019-07-02 PROCEDURE — 36415 COLL VENOUS BLD VENIPUNCTURE: CPT

## 2019-07-18 ENCOUNTER — HOSPITAL ENCOUNTER (OUTPATIENT)
Dept: CT IMAGING | Facility: HOSPITAL | Age: 74
Discharge: HOME | End: 2019-07-18
Attending: INTERNAL MEDICINE
Payer: MEDICARE

## 2019-07-18 DIAGNOSIS — R91.1 PULMONARY NODULE: ICD-10-CM

## 2019-07-18 PROCEDURE — 71250 CT THORAX DX C-: CPT

## 2019-08-02 ENCOUNTER — OFFICE VISIT (OUTPATIENT)
Dept: CARDIOLOGY CLINIC | Facility: CLINIC | Age: 74
End: 2019-08-02
Payer: MEDICARE

## 2019-08-02 VITALS
HEIGHT: 68 IN | BODY MASS INDEX: 25.61 KG/M2 | DIASTOLIC BLOOD PRESSURE: 62 MMHG | SYSTOLIC BLOOD PRESSURE: 100 MMHG | HEART RATE: 66 BPM | OXYGEN SATURATION: 96 % | WEIGHT: 169 LBS

## 2019-08-02 DIAGNOSIS — R06.00 DYSPNEA ON EXERTION: ICD-10-CM

## 2019-08-02 DIAGNOSIS — I10 ESSENTIAL HYPERTENSION: ICD-10-CM

## 2019-08-02 DIAGNOSIS — I42.8 NICM (NONISCHEMIC CARDIOMYOPATHY) (HCC): Primary | ICD-10-CM

## 2019-08-02 DIAGNOSIS — J44.9 COPD, SEVERE (HCC): ICD-10-CM

## 2019-08-02 PROCEDURE — 99214 OFFICE O/P EST MOD 30 MIN: CPT | Performed by: INTERNAL MEDICINE

## 2019-08-02 NOTE — PROGRESS NOTES
Cardiology   Emi Card 69 y o  male MRN: [id number]        Impression:  1  Non-ischemic cardiomyopathy - patient with NYHA class III systolic heart failure  EF 10%  Not interested in ICD/BiV - even if would improve symptoms  Does not want to have any major intervention  Appears more compensated at this time  2  Hypertension - controlled  3  Tobacco usage - quit  4  COPD - started on Ellipita   Appreciate Pulmonary input  5  Word finding difficulties - CT Head demonstrated no acute CVA    Stable        Recommendations:  1  Continue current medications  2  Follow up in 4 months  HPI: Emi Card is a 69y o  year old male with non-ischemic cardiomyopathy returns for follow up  He saw Dr Corinne Guard for advanced heart failure - not interested in palliative care at this point  Currently having aphasia  Breathing improving  Has been losing weight  No chest pain or palpitations  Review of Systems   Constitutional: Negative  HENT: Negative  Eyes: Negative  Respiratory: Positive for shortness of breath  Negative for chest tightness  Cardiovascular: Negative for chest pain, palpitations and leg swelling  Gastrointestinal: Negative  Endocrine: Negative  Genitourinary: Negative  Musculoskeletal: Negative  Skin: Negative  Allergic/Immunologic: Negative  Neurological: Negative  Hematological: Negative  Psychiatric/Behavioral: Negative  All other systems reviewed and are negative          Past Medical History:   Diagnosis Date    Cardiomyopathy [facility])     CHF (congestive heart failure) (HCC)     COPD (chronic obstructive pulmonary disease) (HCC)     Dyspnea on exertion     Essential hypertension     Non-ischemic cardiomyopathy (HCC)      Past Surgical History:   Procedure Laterality Date    PILONIDAL CYST EXCISION       Social History     Substance and Sexual Activity   Alcohol Use Yes    Alcohol/week: 2 0 standard drinks    Types: 2 Cans of beer per week    Binge frequency: Less than monthly    Comment: occasion     Social History     Substance and Sexual Activity   Drug Use No     Social History     Tobacco Use   Smoking Status Former Smoker    Packs/day: 0 20    Years: 60 00    Pack years: 12 00    Types: Cigarettes    Last attempt to quit: 2019    Years since quittin 5   Smokeless Tobacco Never Used   Tobacco Comment    Two cigarettes a day with lunch and dinner      Family History   Problem Relation Age of Onset    Breast cancer Mother 55    Hypertension Father     Cancer Father     Cancer Family     Heart disease Maternal Uncle     Coronary artery disease Maternal Uncle     Heart failure Maternal Uncle     Breast cancer Sister 48    Heart attack Neg Hx     Stroke Neg Hx     Anuerysm Neg Hx     Arrhythmia Neg Hx     Clotting disorder Neg Hx     Hyperlipidemia Neg Hx        Allergies:  No Known Allergies    Medications:     Current Outpatient Medications:     albuterol (VENTOLIN HFA) 90 mcg/act inhaler, Inhale 2 puffs every 6 (six) hours as needed for wheezing, Disp: 18 g, Rfl: 0    aspirin (ECOTRIN LOW STRENGTH) 81 mg EC tablet, Take 81 mg by mouth daily, Disp: , Rfl:     atorvastatin (LIPITOR) 20 mg tablet, Take 1 tablet (20 mg total) by mouth every evening, Disp: 30 tablet, Rfl: 3    bisoprolol (ZEBETA) 5 mg tablet, Take 0 5 tablets (2 5 mg total) by mouth every 12 (twelve) hours, Disp: 180 tablet, Rfl: 3    furosemide (LASIX) 20 mg tablet, Take 2 tablets in the morning and 1 tablet in the afternoon for a total of 60 mg per day , Disp: 360 tablet, Rfl: 3    sacubitril-valsartan (ENTRESTO)  MG TABS, Take 1 tablet by mouth 2 (two) times a day, Disp: 180 tablet, Rfl: 3    umeclidinium bromide (INCRUSE ELLIPTA) 62 5 mcg/inh AEPB inhaler, Inhale 1 puff daily, Disp: 3 Inhaler, Rfl: 3    umeclidinium-vilanterol (ANORO ELLIPTA) 62 5-25 MCG/INH inhaler, Inhale 1 puff daily for 7 days, Disp: 7 each, Rfl: 0      Wt Readings from Last 3 Encounters:   08/02/19 76 7 kg (169 lb)   06/13/19 80 3 kg (177 lb)   05/02/19 76 9 kg (169 lb 9 6 oz)     Temp Readings from Last 3 Encounters:   04/19/19 (!) 97 3 °F (36 3 °C) (Tympanic)   01/14/19 97 6 °F (36 4 °C) (Tympanic)     BP Readings from Last 3 Encounters:   08/02/19 100/62   06/13/19 100/52   05/02/19 118/62     Pulse Readings from Last 3 Encounters:   08/02/19 66   06/13/19 78   05/02/19 78         Physical Exam   Constitutional: He is oriented to person, place, and time  He appears well-developed  HENT:   Head: Atraumatic  Eyes: EOM are normal    Neck: Normal range of motion  Cardiovascular: Normal rate, regular rhythm and normal heart sounds  Exam reveals no gallop and no friction rub  No murmur heard  1+ B LE edema   Pulmonary/Chest: Effort normal and breath sounds normal  No respiratory distress  Abdominal: Soft  Musculoskeletal: Normal range of motion  Neurological: He is alert and oriented to person, place, and time  Skin: Skin is warm and dry  Psychiatric: He has a normal mood and affect           Laboratory Studies:  CMP:  Lab Results   Component Value Date     10/22/2015    K 5 2 05/10/2019     05/10/2019    CO2 30 05/10/2019    ANIONGAP 3 (L) 10/22/2015    BUN 19 05/10/2019    CREATININE 1 18 05/10/2019    GLUCOSE 100 10/22/2015    AST 14 04/10/2019    ALT 17 04/10/2019    BILITOT 0 69 10/22/2015    EGFR 61 05/10/2019       Lipid Profile:   Lab Results   Component Value Date    CHOL 190 10/22/2015     Lab Results   Component Value Date    HDL 82 (H) 04/10/2019     Lab Results   Component Value Date    LDLCALC 92 04/10/2019     Lab Results   Component Value Date    TRIG 68 04/10/2019       Cardiac testing:     Results for orders placed during the hospital encounter of 01/23/19   Echo complete with contrast if indicated    Narrative [facility] [address] [facility]  [phone]    Transthoracic Echocardiogram  2D, M-mode, Doppler, and Color Doppler    Study date:  2019    Patient: Mendoza Mckeon  MR number: [id number]  Account number: [de-identified]  : 1945  Age: 69 years  Gender: Male  Status: Outpatient  Location: [address] [place] [address]  Height: 68 in  Weight: 164 6 lb  BP: 110/ 60 mmHg    Indications: Assess left ventricular function  Diagnoses: R06 02 - Shortness of breath    Sonographer:  LENIN Harp  Primary Physician:  Narda Owusu DO  Referring Physician:  Clayton Mckenzie MD  Group:  [facility]  Interpreting Physician:  Alex Kaplan MD    SUMMARY    LEFT VENTRICLE:  The ventricle was dilated  Systolic function was severely reduced  Ejection fraction was estimated to be 10 %  There was severe diffuse hypokinesis  Doppler parameters were consistent with restrictive physiology, indicative of decreased left ventricular diastolic compliance and/or increased left atrial pressure  Doppler parameters were consistent with high ventricular filling pressure  RIGHT VENTRICLE:  The ventricle was dilated  LEFT ATRIUM:  The atrium was dilated  RIGHT ATRIUM:  The atrium was dilated  MITRAL VALVE:  There was mild to moderate regurgitation  TRICUSPID VALVE:  There was moderate regurgitation  Estimated peak PA pressure was 51 mmHg  The findings suggest mild to moderate pulmonary hypertension  PULMONIC VALVE:  There was mild regurgitation  AORTA:  The root exhibited normal size and mild fibrocalcific change  HISTORY: PRIOR HISTORY: Cardiomyopathy, COPD, Dyspnea of exertion, CVA, Tobacco abuse    PROCEDURE: The study was performed in the [address] [place] [address]  This was a routine study  The transthoracic approach was used  The study included complete 2D imaging, M-mode, complete spectral Doppler, and color Doppler  The  heart rate was 110 bpm, at the start of the study   Images were obtained from the parasternal, apical, subcostal, and suprasternal notch acoustic windows  Image quality was adequate  LEFT VENTRICLE: The ventricle was dilated  Systolic function was severely reduced  Ejection fraction was estimated to be 10 %  There was severe diffuse hypokinesis  Wall thickness was normal  DOPPLER: There was a reduced contribution of  atrial contraction to ventricular filling, due to increased ventricular diastolic pressure or atrial contractile dysfunction  Doppler parameters were consistent with restrictive physiology, indicative of decreased left ventricular  diastolic compliance and/or increased left atrial pressure  Doppler parameters were consistent with high ventricular filling pressure  RIGHT VENTRICLE: The ventricle was dilated  Systolic function was normal  Wall thickness was normal     LEFT ATRIUM: The atrium was dilated  RIGHT ATRIUM: The atrium was dilated  MITRAL VALVE: Valve structure was normal  There was normal leaflet separation  DOPPLER: The transmitral velocity was within the normal range  There was no evidence for stenosis  There was mild to moderate regurgitation  AORTIC VALVE: The valve was trileaflet  Leaflets exhibited mild calcification, normal cuspal separation, and sclerosis  DOPPLER: Transaortic velocity was within the normal range  There was no evidence for stenosis  There was trace  regurgitation  TRICUSPID VALVE: The valve structure was normal  There was normal leaflet separation  DOPPLER: The transtricuspid velocity was within the normal range  There was no evidence for stenosis  There was moderate regurgitation  Estimated peak PA  pressure was 51 mmHg  The findings suggest mild to moderate pulmonary hypertension  PULMONIC VALVE: Leaflets exhibited normal thickness, no calcification, and normal cuspal separation  DOPPLER: The transpulmonic velocity was within the normal range  There was mild regurgitation  PERICARDIUM: There was no pericardial effusion   The pericardium was normal in appearance  AORTA: The root exhibited normal size and mild fibrocalcific change  SYSTEMIC VEINS: IVC: The inferior vena cava was normal in size   Respirophasic changes were normal     SYSTEM MEASUREMENT TABLES    2D  %FS: 9 09 %  AV Diam: 3 56 cm  EDV(Teich): 230 97 ml  EF(Cube): 24 87 %  EF(Teich): 19 48 %  ESV(Cube): 225 46 ml  ESV(Teich): 185 97 ml  IVSd: 0 8 cm  LA Area: 36 38 cm2  LA Diam: 4 67 cm  LVEDV MOD A4C: 209 89 ml  LVEF MOD A4C: 18 83 %  LVESV MOD A4C: 170 37 ml  LVIDd: 6 7 cm  LVIDs: 6 09 cm  LVLd A4C: 9 3 cm  LVLs A4C: 8 79 cm  LVPWd: 0 82 cm  RA Area: 25 5 cm2  RV Diam: 5 17 cm  SI(Cube): 39 49 ml/m2  SI(Teich): 23 81 ml/m2  SV MOD A4C: 39 51 ml  SV(Cube): 74 64 ml  SV(Teich): 45 ml    CW  TR MaxP 49 mmHg  TR Vmax: 3 29 m/s    MM  TAPSE: 2 73 cm    PW  AVC: 406 66 ms    [facility] Accredited [facility]chocardiography [facility]    Prepared and electronically signed by    Laquita Alvarado MD  Signed 2019 14:56:03

## 2019-08-08 ENCOUNTER — OFFICE VISIT (OUTPATIENT)
Dept: NEUROLOGY | Facility: CLINIC | Age: 74
End: 2019-08-08
Payer: MEDICARE

## 2019-08-08 VITALS
BODY MASS INDEX: 25.54 KG/M2 | WEIGHT: 168.5 LBS | HEART RATE: 69 BPM | DIASTOLIC BLOOD PRESSURE: 72 MMHG | HEIGHT: 68 IN | SYSTOLIC BLOOD PRESSURE: 120 MMHG

## 2019-08-08 DIAGNOSIS — I10 ESSENTIAL HYPERTENSION: Primary | ICD-10-CM

## 2019-08-08 DIAGNOSIS — R47.01 APHASIA: ICD-10-CM

## 2019-08-08 DIAGNOSIS — I65.23 BILATERAL CAROTID ARTERY STENOSIS: ICD-10-CM

## 2019-08-08 DIAGNOSIS — Z86.73 HISTORY OF STROKE: ICD-10-CM

## 2019-08-08 PROCEDURE — 99214 OFFICE O/P EST MOD 30 MIN: CPT | Performed by: PSYCHIATRY & NEUROLOGY

## 2019-08-08 NOTE — PROGRESS NOTES
Patient ID: Irina Lee is a 69 y o  male  Assessment/Plan:    No problem-specific Assessment & Plan notes found for this encounter  There are no diagnoses linked to this encounter  Patient Instructions   Stroke:  Rachel Pulido presents for a follow-up with regard to his altered speech  As of his MRI from April of 2019 there was mention made of an area of scarring/encephalomalacia in the left frontoparietal junction which is consistent with an old/chronic stroke  It is unclear whether this occurred last summer at the time when he began to have altered speech but that is a distinct possibility  At this point I would suggest that his small vessel risk factors are reasonably well controlled  He is not smoking  He reports no new symptoms concerning for TIA or stroke  He is taking his medications  He did not endorse any bleeding   -for ongoing stroke prevention he should continue his combination of aspirin, Lipitor, and appropriate blood pressure and glycemic control  -we will continue to defer to the good judgment of his primary care team for monitoring of his cholesterol panel and blood sugar numbers  We would suggest continuing his Lipitor, and maintaining a blood pressure of less than 130/80 on a regular basis  -he has very minimal bilateral carotid artery stenosis  This should be monitored in general, and we will request a repeat carotid Doppler ultrasound to be done 6 months from now   -I would like him to remain physically active, and to consider adopting an active hobby that will allow him to exercise more at least a few times per week  -at this point in time he is not in need of any repeat MRI imaging  Notably on his exam in the office today he does have weakness of the right hamstrings without pain  This was not noted at the time of his last visit to the office however at that point in time his quadriceps were clearly checked but potential not his hamstring in isolation    Considering the significant difference in the muscle strength, and the fact that he has not noticed any change in his gait, balance, or the strength in his leg, I suggest that this is most likely chronic and might even be related to the same event which caused his altered speech  I will plan for him to return to the office to see us in 8 months time at our [facility] location  but I would be happy to see him sooner if the need should arise  If he has any symptoms concerning for stroke such as sudden painless loss of vision or double vision, difficulty speaking or swallowing that is new, vertigo/room spinning that does not quickly resolve, or weakness/numbness affecting 1 side of the face or body he should proceed by ambulance to the nearest emergency room immediately  Subjective:    LIANNA     Salvador Foster presents for follow-up with regard to his prior stroke  Specifically, he had the onset of altered speech last summer  When we initially saw him in the office there was clearly some concern that this represented stroke  His initial MRI after our visit in the office does show a region of encephalomalacia potentially concerning for a stroke which was already chronic appearing as of April of 2019  It was in the left frontal parietal region  Subsequent to seeing this on MRI we requested that he begin taking a statin medication  He had already been placed on aspirin by his primary care physician which was quite appropriate  In the interval since his last visit the office he notes no new symptoms and no issues concerning for TIA or stroke  He is taking his medications  He did not endorse any bleeding or bruising  He did not endorse any falls  He did not mention any issues with walking or balance however he does have clear weakness in his right hamstrings on exam in the office today    I would suggest that, considering the degree of weakness in the fact that he did not notice anything different it is most likely that this is chronic and may actually be related to his prior stroke  He does have minimal bilateral asymptomatic carotid artery stenosis which will be monitoring  Past Medical History:   Diagnosis Date    Cardiomyopathy [facility])     [facility]F [facility]congestive heart failure) ([facility])     COPD (chronic obstructive pulmonary disease) ([facility])     Dyspnea on exertion     Essential hypertension     Non-ischemic cardiomyopathy ([facility])        Social History     Socioeconomic History    Marital status: /Civil Union     Spouse name: None    Number of children: None    Years of education: None    Highest education level: None   Occupational History    None   Social Needs    Financial resource strain: None    Food insecurity:     Worry: None     Inability: None    Transportation needs:     Medical: None     Non-medical: None   Tobacco Use    Smoking status: Former Smoker     Packs/day: 0 20     Years: 60 00     Pack years: 12 00     Types: Cigarettes     Last attempt to quit: 2019     Years since quittin 5    Smokeless tobacco: Never Used    Tobacco comment: Two cigarettes a day with lunch and dinner    Substance and Sexual Activity    Alcohol use:  Yes     Alcohol/week: 2 0 standard drinks     Types: 2 Cans of beer per week     Binge frequency: Less than monthly     Comment: occasion    Drug use: No    Sexual activity: None   Lifestyle    Physical activity:     Days per week: None     Minutes per session: None    Stress: None   Relationships    Social connections:     Talks on phone: None     Gets together: None     Attends Hoahaoism service: None     Active member of club or organization: None     Attends meetings of clubs or organizations: None     Relationship status: None    Intimate partner violence:     Fear of current or ex partner: None     Emotionally abused: None     Physically abused: None     Forced sexual activity: None   Other Topics Concern    None   Social History Narrative    None       Family History   Problem Relation Age of Onset    Breast cancer Mother 55    Hypertension Father     Cancer Father     Cancer Family     Heart disease Maternal Uncle     Coronary artery disease Maternal Uncle     Heart failure Maternal Uncle     Breast cancer Sister 48    Heart attack Neg Hx     Stroke Neg Hx     Anuerysm Neg Hx     Arrhythmia Neg Hx     Clotting disorder Neg Hx     Hyperlipidemia Neg Hx          Current Outpatient Medications:     albuterol (VENTOLIN HFA) 90 mcg/act inhaler, Inhale 2 puffs every 6 (six) hours as needed for wheezing, Disp: 18 g, Rfl: 0    aspirin (ECOTRIN LOW STRENGTH) 81 mg EC tablet, Take 81 mg by mouth daily, Disp: , Rfl:     atorvastatin (LIPITOR) 20 mg tablet, Take 1 tablet (20 mg total) by mouth every evening, Disp: 30 tablet, Rfl: 3    bisoprolol (ZEBETA) 5 mg tablet, Take 0 5 tablets (2 5 mg total) by mouth every 12 (twelve) hours, Disp: 180 tablet, Rfl: 3    furosemide (LASIX) 20 mg tablet, Take 2 tablets in the morning and 1 tablet in the afternoon for a total of 60 mg per day , Disp: 360 tablet, Rfl: 3    sacubitril-valsartan (ENTRESTO)  MG TABS, Take 1 tablet by mouth 2 (two) times a day, Disp: 180 tablet, Rfl: 3    umeclidinium bromide (INCRUSE ELLIPTA) 62 5 mcg/inh AEPB inhaler, Inhale 1 puff daily, Disp: 3 Inhaler, Rfl: 3    umeclidinium-vilanterol (ANORO ELLIPTA) 62 5-25 MCG/INH inhaler, Inhale 1 puff daily for 7 days (Patient not taking: Reported on 8/8/2019), Disp: 7 each, Rfl: 0        The following portions of the patient's history were reviewed: allergies, current medications, past family history, past medical history, past social history and problem list            Objective:    Blood pressure 120/72, pulse 69, height 5' 8" (1 727 m), weight 76 4 kg (168 lb 8 oz)  Physical Exam    Neurological Exam    At the time of our examination he was awake, alert, and in no distress  He had occasional word-finding issues with minimal dysarthria  Cranial nerves 2-12 were symmetrically intact bilaterally with the exception of visual field, although there were no obvious visual field deficits  Motor testing reveals 5/5 strength in the bilateral upper lower extremities with the exception of the hamstrings  This is 4+/5 on the left and 4-/5 on the right  His gait is narrow based and stable without any clear instability at the hip  There is no obvious ataxia  Deep tendon reflexes were 3+ and symmetric in the bilateral upper lower extremities  ROS:    Review of Systems   Constitutional: Negative  Negative for appetite change and fever  HENT: Negative  Negative for hearing loss, tinnitus, trouble swallowing and voice change  Eyes: Negative  Negative for photophobia and pain  Respiratory: Negative  Negative for shortness of breath  Cardiovascular: Negative  Negative for palpitations  Gastrointestinal: Negative  Negative for nausea and vomiting  Endocrine: Negative  Negative for cold intolerance and heat intolerance  Genitourinary: Negative  Negative for dysuria, frequency and urgency  Musculoskeletal: Negative  Negative for myalgias and neck pain  Skin: Negative  Negative for rash  Neurological: Positive for speech difficulty (pt states people think he is drunk when he is talking)  Negative for dizziness, tremors, seizures, syncope, facial asymmetry, weakness, light-headedness, numbness and headaches  Trouble word finding   Hematological: Negative  Does not bruise/bleed easily  Psychiatric/Behavioral: Negative  Negative for confusion, hallucinations and sleep disturbance  Reviewed ROS as entered by medical assistant

## 2019-08-08 NOTE — PATIENT INSTRUCTIONS
Stroke:  Los Smith presents for a follow-up with regard to his altered speech  As of his MRI from April of 2019 there was mention made of an area of scarring/encephalomalacia in the left frontoparietal junction which is consistent with an old/chronic stroke  It is unclear whether this occurred last summer at the time when he began to have altered speech but that is a distinct possibility  At this point I would suggest that his small vessel risk factors are reasonably well controlled  He is not smoking  He reports no new symptoms concerning for TIA or stroke  He is taking his medications  He did not endorse any bleeding   -for ongoing stroke prevention he should continue his combination of aspirin, Lipitor, and appropriate blood pressure and glycemic control  -we will continue to defer to the good judgment of his primary care team for monitoring of his cholesterol panel and blood sugar numbers  We would suggest continuing his Lipitor, and maintaining a blood pressure of less than 130/80 on a regular basis  -he has very minimal bilateral carotid artery stenosis  This should be monitored in general, and we will request a repeat carotid Doppler ultrasound to be done 6 months from now   -I would like him to remain physically active, and to consider adopting an active hobby that will allow him to exercise more at least a few times per week  -at this point in time he is not in need of any repeat MRI imaging  Notably on his exam in the office today he does have weakness of the right hamstrings without pain  This was not noted at the time of his last visit to the office however at that point in time his quadriceps were clearly checked but potential not his hamstring in isolation    Considering the significant difference in the muscle strength, and the fact that he has not noticed any change in his gait, balance, or the strength in his leg, I suggest that this is most likely chronic and might even be related to the same event which caused his altered speech  I will plan for him to return to the office to see us in 8 months time at our [place] location  but I would be happy to see him sooner if the need should arise  If he has any symptoms concerning for stroke such as sudden painless loss of vision or double vision, difficulty speaking or swallowing that is new, vertigo/room spinning that does not quickly resolve, or weakness/numbness affecting 1 side of the face or body he should proceed by ambulance to the nearest emergency room immediately

## 2019-08-20 ENCOUNTER — HOSPITAL ENCOUNTER (OUTPATIENT)
Dept: NON INVASIVE DIAGNOSTICS | Facility: CLINIC | Age: 74
Discharge: HOME | End: 2019-08-20
Payer: MEDICARE

## 2019-08-20 DIAGNOSIS — I65.23 BILATERAL CAROTID ARTERY STENOSIS: ICD-10-CM

## 2019-08-20 PROCEDURE — 93880 EXTRACRANIAL BILAT STUDY: CPT

## 2019-08-20 PROCEDURE — 93880 EXTRACRANIAL BILAT STUDY: CPT | Performed by: SURGERY

## 2019-08-23 ENCOUNTER — TELEPHONE (OUTPATIENT)
Dept: NEUROLOGY | Facility: CLINIC | Age: 74
End: 2019-08-23

## 2019-08-23 NOTE — TELEPHONE ENCOUNTER
Letter mailed     ----- Message from [facility], RN sent at 8/23/2019  9:39 AM EDT -----  Regarding: FW: Results Review  Please mail to pt  ----- Message -----  From: Anne Harvey MD  Sent: 8/22/2019   1:14 PM EDT  [facility]  Subject: Results Review

## 2019-08-25 NOTE — PROGRESS NOTES
Pulmonary Follow Up Note   Judy Tamez 69 y o  male MRN: [id number]  8/25/2019      Assessment:  1  Dyspnea on Exertion - resolved  · Clinically improved, no change in regimen     2  Severe COPD  · GOLD Stage III, Class C - FEV1 1 06L 35% 10/2018  · Evidence of air trapping on PFTs  · No change with trial of Anoroa  · Will continue with Incruse 1puff daily  · Encouraged prn use of CLAYTON  · Reviewed signs and symptoms of AECOPD and encouraged to call should these develop  · Encouraged yearly flu vaccine, pneumovax 2017, asked to follow up with PCP regarding prevnar status  · Follow up in 6 months or sooner as needed     3  Pulmonary Nodule  · Multiple risk factors for malignancy  · CT/PET with minimal FDG avidity - SUV 1 4  · Decreased size on repeat CT Chest (July 2019_  · Plan transition to yearly follow up     4  Active tobacco abuse  · Again encouraged him to remain smoke free     5  Non-ischemic Cardiomyopathy - no evidence of pulmonary edema, further care per Dr Iraida Hutchins    6  Expressive Aphasia - further care per neurology    Plan:    Diagnoses and all orders for this visit:    COPD, severe ([address] )    NICM (nonischemic cardiomyopathy) ([facility] [address] )    Dyspnea on exertion    Pulmonary nodule    Tobacco abuse in remission        No follow-ups on file  History of Present Illness   HPI:  Judy Tamez is a 69 y o  male who presented for dyspnea and COPD evaluation by Dr Iraida Hutchins  He has history of NICM EF 10-20% and HTN  Nanci Green was initially seen in Jan 2019 and diagnosed with severe COPD and did not require supplemental oxygen  He was last seen in April 2019 and plans to give trial of Anoro, repeat CT at 3 month interval, and remain tobacco free      He reports he has felt well  His exertional dyspnea has resolved, he reported no difficulties with ADLs or EADLs  He noted not change with Anoro and returned to Incruse  He reports using rescue inhaler 1-2 times per week based upon exertional needs    He denied sputum production, no hemoptysis, no pleurisy  He has continued expressive aphasia difficulties      He reports he has fully quit smoking since Jan 2019      Review of Systems   Constitutional: Negative for activity change, chills, fatigue, fever and unexpected weight change  HENT: Negative for mouth sores, postnasal drip, rhinorrhea, sore throat, trouble swallowing and voice change  Eyes: Negative for visual disturbance  Respiratory: Negative for cough, chest tightness, shortness of breath and wheezing  Cardiovascular: Negative for chest pain, palpitations and leg swelling  Gastrointestinal: Negative for abdominal pain, diarrhea, nausea and vomiting  Endocrine: Negative for cold intolerance and heat intolerance  Musculoskeletal: Negative for arthralgias and gait problem  Skin: Negative for rash  Allergic/Immunologic: Negative for immunocompromised state  Neurological: Positive for speech difficulty  Negative for dizziness, light-headedness and headaches  Hematological: Negative for adenopathy  Psychiatric/Behavioral: Negative for sleep disturbance  The patient is not nervous/anxious          Historical Information   Past Medical History:   Diagnosis Date    Cardiomyopathy ([facility] [address] )     CHF (congestive heart failure) (HCC)     COPD (chronic obstructive pulmonary disease) (HCC)     Dyspnea on exertion     Essential hypertension     Non-ischemic cardiomyopathy (HCC)      Past Surgical History:   Procedure Laterality Date    PILONIDAL CYST EXCISION       Family History   Problem Relation Age of Onset    Breast cancer Mother 55    Hypertension Father     Cancer Father     Cancer Family     Heart disease Maternal Uncle     Coronary artery disease Maternal Uncle     Heart failure Maternal Uncle     Breast cancer Sister 48    Heart attack Neg Hx     Stroke Neg Hx     Anuerysm Neg Hx     Arrhythmia Neg Hx     Clotting disorder Neg Hx     Hyperlipidemia Neg Hx          Meds/Allergies Current Outpatient Medications:     albuterol (VENTOLIN HFA) 90 mcg/act inhaler, Inhale 2 puffs every 6 (six) hours as needed for wheezing, Disp: 18 g, Rfl: 0    aspirin (ECOTRIN LOW STRENGTH) 81 mg EC tablet, Take 81 mg by mouth daily, Disp: , Rfl:     atorvastatin (LIPITOR) 20 mg tablet, Take 1 tablet (20 mg total) by mouth every evening, Disp: 30 tablet, Rfl: 3    bisoprolol (ZEBETA) 5 mg tablet, Take 0 5 tablets (2 5 mg total) by mouth every 12 (twelve) hours, Disp: 180 tablet, Rfl: 3    furosemide (LASIX) 20 mg tablet, Take 2 tablets in the morning and 1 tablet in the afternoon for a total of 60 mg per day , Disp: 360 tablet, Rfl: 3    sacubitril-valsartan (ENTRESTO)  MG TABS, Take 1 tablet by mouth 2 (two) times a day, Disp: 180 tablet, Rfl: 3    umeclidinium bromide (INCRUSE ELLIPTA) 62 5 mcg/inh AEPB inhaler, Inhale 1 puff daily, Disp: 3 Inhaler, Rfl: 3    umeclidinium-vilanterol (ANORO ELLIPTA) 62 5-25 MCG/INH inhaler, Inhale 1 puff daily for 7 days (Patient not taking: Reported on 8/8/2019), Disp: 7 each, Rfl: 0  No Known Allergies    Vitals: There were no vitals taken for this visit  There is no height or weight on file to calculate BMI  Physical Exam  Physical Exam   Constitutional: He is oriented to person, place, and time  He appears well-developed and well-nourished  No distress  HENT:   Head: Normocephalic and atraumatic  Right Ear: External ear normal    Left Ear: External ear normal    Nose: Nose normal    Mouth/Throat: No oropharyngeal exudate  No thrush, poor dentition   Eyes: Pupils are equal, round, and reactive to light  Conjunctivae are normal  Right eye exhibits no discharge  Left eye exhibits no discharge  No scleral icterus  Neck: Neck supple  No JVD present  No tracheal deviation present  Cardiovascular: Normal rate, regular rhythm and normal heart sounds  No murmur heard  Pulmonary/Chest: Effort normal  No stridor  No respiratory distress   He has no wheezes  He has no rales  Slightly diminished BS diffusely, no wheeze or rales   Abdominal: Soft  Bowel sounds are normal  He exhibits no distension  There is no tenderness  Musculoskeletal: He exhibits no edema or deformity  Lymphadenopathy:     He has no cervical adenopathy  Neurological: He is alert and oriented to person, place, and time  Skin: Skin is warm and dry  No rash noted  Psychiatric: He has a normal mood and affect  His behavior is normal    Vitals reviewed  Labs: I have personally reviewed pertinent lab results    Lab Results   Component Value Date    WBC 4 06 (L) 07/02/2019    HGB 11 4 (L) 07/02/2019    HCT 35 6 (L) 07/02/2019     (H) 07/02/2019     (L) 07/02/2019     Lab Results   Component Value Date    GLUCOSE 100 10/22/2015    CALCIUM 9 0 05/10/2019     10/22/2015    K 5 2 05/10/2019    CO2 30 05/10/2019     05/10/2019    BUN 19 05/10/2019    CREATININE 1 18 05/10/2019     No results found for: IGE  Lab Results   Component Value Date    ALT 17 04/10/2019    AST 14 04/10/2019    ALKPHOS 77 04/10/2019    BILITOT 0 69 10/22/2015     Imaging and other studies: New images personally reviewed  CT Chest 7/18/19 - resolved LLL pneumonia with resolving RLL nodule and likely residual scar, background upper lobe emphysematous changes, bilateral pleural plaques    CT/PET - 4/17/19 - 1 3x0 8cm RLL nodule with SUV 1 4, 4mm RLL nodule, slight nodular density LLL, no significant mediastinal adenopathy     CT Chest 4/5/19 - noted upper lobe predominant emphysematous changes with 1 2cm RLL spiculated nodule, trace right effusion within fissure, no significant mediastinal adenopathy     CXR 10/22/18 - hyperinflation, flat HD and enlarged anterior/posterior clear space, blunted right CP angle with obscured right HD and suggestion of basilar scarring, no PTX, mild volume loss on right     Pulmonary function testing:   10/22/18 - Ratio 47%, FVC 2 25L (55%), FEV1 1 06L (35%), FEV1 inc 1% post BD, TLC 88%, %, DLCO 19% - severe obstructive airflow defect with reduced VC, normal TLC with increased RV indicative of air trapping and severely reduced diffusion     01/14/19 - 6MWT on RA - total walk distance 397 meters, initial SpO2 97%, romeo SpO2 94%, at conclusion 95%, initial HR 85bpm, maximal HR 107bpm, Ruben Dyspnea Scale 0/10-->1/10     EKG, Pathology, and Other Studies: I have personally reviewed pertinent reports     TTE 1/2019 - TTE EF 10%, PASP 51mmHg, dilated RV     TTE 2014 - EF 20%, normal RV, PASP in normal range    Ross Banda DO, [facility]

## 2019-08-26 ENCOUNTER — OFFICE VISIT (OUTPATIENT)
Dept: PULMONOLOGY | Facility: CLINIC | Age: 74
End: 2019-08-26
Payer: MEDICARE

## 2019-08-26 VITALS
HEIGHT: 68 IN | BODY MASS INDEX: 25.67 KG/M2 | OXYGEN SATURATION: 98 % | DIASTOLIC BLOOD PRESSURE: 70 MMHG | HEART RATE: 67 BPM | SYSTOLIC BLOOD PRESSURE: 110 MMHG | TEMPERATURE: 97.8 F | WEIGHT: 169.4 LBS

## 2019-08-26 DIAGNOSIS — R06.00 DYSPNEA ON EXERTION: ICD-10-CM

## 2019-08-26 DIAGNOSIS — I42.8 NICM (NONISCHEMIC CARDIOMYOPATHY) (HCC): ICD-10-CM

## 2019-08-26 DIAGNOSIS — R91.1 PULMONARY NODULE: ICD-10-CM

## 2019-08-26 DIAGNOSIS — J44.9 COPD, SEVERE (HCC): Primary | ICD-10-CM

## 2019-08-26 DIAGNOSIS — Z72.0 TOBACCO ABUSE: ICD-10-CM

## 2019-08-26 PROCEDURE — 99214 OFFICE O/P EST MOD 30 MIN: CPT | Performed by: INTERNAL MEDICINE

## 2019-08-26 NOTE — PATIENT INSTRUCTIONS
· Continue Incruse Daily  · Continue albuterol as needed for cough and shortness of breath  · Check with Dr Suzanne Veronica if you have obtained your Prevnar vaccine, you had Pneumovax in 2017  · Follow up in 6 months or sooner as needed    COPD (Chronic Obstructive Pulmonary Disease)   AMBULATORY CARE:   COPD (chronic obstructive pulmonary disease)  is a lung disease that makes it hard for you to breathe  COPD is usually a result of lung damage caused by years of irritation and inflammation  COPD limits air flow in your lungs  Smoking, pollution, genetics, or a history of lung infections can increase your risk for COPD  Common symptoms include the following:   · Shortness of breath     · A dry cough     · Coughing fits that bring up mucus from your lungs     · Wheezing and chest tightness  Call 911 if:   · You feel lightheaded, short of breath, and have chest pain  Seek care immediately if:   · You are confused, dizzy, or feel faint  · Your arm or leg feels warm, tender, and painful  It may look swollen and red  · You cough up blood  Contact your healthcare provider if:   · You have more shortness of breath than usual      · You need more medicine than usual to control your symptoms  · You are coughing or wheezing more than usual      · You are coughing up more mucus, or it is a different color or has a different odor  · You gain more than 3 pounds in a week  · You have a fever, a runny or stuffy nose, and a sore throat, or other cold or flu symptoms  · Your skin, lips, or nails start to turn blue  · You have swelling in your legs or ankles  · You are very tired or weak for more than a day  · You notice changes in your mood, or changes in your ability to think or concentrate  · You have questions or concerns about your condition or care  Treatment for COPD  may include medicines to help decrease swelling and inflammation in your lungs   Medicines may also help open your airways or treat and infection  You may need pulmonary rehabilitation to help you manage your symptoms and improve your quality of life  You may need extra oxygen to help you breathe easier  Help make breathing easier:   · Use pursed-lip breathing any time you feel short of breath  Take a deep breath in through your nose  Slowly breathe out through your mouth with your lips pursed for twice as long as you inhaled  You can also practice this breathing pattern while you bend, lift, climb stairs, or exercise  It slows down your breathing and helps move more air in and out of your lungs  · Do not smoke, and avoid others who smoke  Nicotine and other substances can cause lung irritation or damage and make it harder for you to breathe  Do not use e-cigarettes or smokeless tobacco  They still contain nicotine  Ask your healthcare provider for information if you currently smoke and need help to quit  For support and more information:  ¨ [employer] offices  Phone: 0- 602 - 938-9479  Web Address: eMithilaHaat      · Be aware of and avoid anything that makes your symptoms worse  Stay out of high altitudes and places with high humidity  Stay inside, or cover your mouth and nose with a scarf when you are outside during cold weather  Stay inside on days when air pollution or pollen counts are high  Do not use aerosol sprays such as deodorant, bug spray, and hair spray  Manage COPD and help prevent exacerbations:  COPD is a serious condition that gets worse over time  A COPD exacerbation means your symptoms suddenly get worse  It is important to prevent exacerbations  An exacerbation can cause more lung damage  COPD cannot be cured, but you can take action to feel better and prevent COPD exacerbations:  · Protect yourself from germs  Germs can get into your lungs and cause an infection  An infection in your lungs can create more mucus and make it harder to breathe   An infection can also create swelling in your airways and prevent air from getting in  You can decrease your risk for infection by doing the following:     [facility] your hands often with soap and water  Carry germ-killing gel with you  You can use the gel to clean your hands when soap and water are not available  ¨ Do not touch your eyes, nose, or mouth unless you have washed your hands first      ¨ Always cover your mouth when you cough  Cough into a tissue or your shirtsleeve so you do not spread germs from your hands  ¨ Try to avoid people who have a cold or the flu  If you are sick, stay away from others as much as possible  · Drink more liquids  This will help to keep your air passages moist and help you cough up mucus  Ask how much liquid to drink each day and which liquids are best for you  · Exercise daily  Exercise for at least 20 minutes each day to help increase your energy and decrease shortness of breath  Talk to your healthcare provider about the best exercise plan for you  · Ask about vaccines  Your healthcare provider may recommend that you get regular flu and pneumonia vaccines  Pneumonia can become life-threatening for a person who has COPD  Ask about other vaccines you may need  Ask your healthcare provider about the flu and pneumonia vaccines  All adults should get the flu (influenza) vaccine every year as soon as it becomes available  The pneumonia vaccine is given to adults aged 72 or older to prevent pneumococcal disease, such as pneumonia  Adults aged 23 to 59 years who are at high risk for pneumococcal disease also should get the pneumococcal vaccine  It may need to be repeated 1 or 5 years later  Pulmonary rehabilitation:  Your healthcare provider may recommend a program to help you manage your symptoms and improve your quality of life  It may include nutritional counseling and exercise, such as walking, to strengthen your lungs     Make decisions about your choices for future treatment:  Ask for information about advanced medical directives and living kemp  These documents help you decide and write down your choices for treatment and end-of-life care  It is best to complete them when you feel well and can think clearly about your wishes  The information can then be kept for future use if you are in the hospital or become very ill  Follow up with your healthcare provider as directed: You may need more tests  Your healthcare provider may refer you to a pulmonary (lung) specialist  Write down your questions so you remember to ask them during your visits  © 2017 [employer] Information is for End User's use only and may not be sold, redistributed or otherwise used for commercial purposes  All illustrations and images included in CareNotes® are the copyrighted property of [employer]  or Uday Vásquez  The above information is an  only  It is not intended as medical advice for individual conditions or treatments  Talk to your doctor, nurse or pharmacist before following any medical regimen to see if it is safe and effective for you

## 2019-10-05 DIAGNOSIS — Z86.73 HISTORY OF STROKE: ICD-10-CM

## 2019-10-07 ENCOUNTER — TRANSCRIBE ORDERS (OUTPATIENT)
Dept: LAB | Facility: CLINIC | Age: 74
End: 2019-10-07

## 2019-10-07 ENCOUNTER — APPOINTMENT (OUTPATIENT)
Dept: LAB | Facility: CLINIC | Age: 74
End: 2019-10-07
Payer: MEDICARE

## 2019-10-07 DIAGNOSIS — Z86.73 HISTORY OF STROKE: ICD-10-CM

## 2019-10-07 DIAGNOSIS — D50.0 IRON DEFICIENCY ANEMIA SECONDARY TO BLOOD LOSS (CHRONIC): ICD-10-CM

## 2019-10-07 DIAGNOSIS — I67.2 CEREBRAL ATHEROSCLEROSIS: ICD-10-CM

## 2019-10-07 DIAGNOSIS — E50.0 VITAMIN A DEFICIENCY WITH CONJUNCTIVAL XEROSIS: Primary | ICD-10-CM

## 2019-10-07 LAB
ALBUMIN SERPL BCP-MCNC: 4.5 G/DL (ref 3.5–5)
ALP SERPL-CCNC: 85 U/L (ref 46–116)
ALT SERPL W P-5'-P-CCNC: 19 U/L (ref 12–78)
ANION GAP SERPL CALCULATED.3IONS-SCNC: 5 MMOL/L (ref 4–13)
AST SERPL W P-5'-P-CCNC: 14 U/L (ref 5–45)
BASOPHILS # BLD AUTO: 0.03 THOUSANDS/ΜL (ref 0–0.1)
BASOPHILS NFR BLD AUTO: 1 % (ref 0–1)
BILIRUB SERPL-MCNC: 0.58 MG/DL (ref 0.2–1)
BUN SERPL-MCNC: 29 MG/DL (ref 5–25)
CALCIUM SERPL-MCNC: 9.5 MG/DL (ref 8.3–10.1)
CHLORIDE SERPL-SCNC: 108 MMOL/L (ref 100–108)
CHOLEST SERPL-MCNC: 169 MG/DL (ref 50–200)
CO2 SERPL-SCNC: 29 MMOL/L (ref 21–32)
CREAT SERPL-MCNC: 1.32 MG/DL (ref 0.6–1.3)
EOSINOPHIL # BLD AUTO: 0.8 THOUSAND/ΜL (ref 0–0.61)
EOSINOPHIL NFR BLD AUTO: 15 % (ref 0–6)
ERYTHROCYTE [DISTWIDTH] IN BLOOD BY AUTOMATED COUNT: 14.8 % (ref 11.6–15.1)
GFR SERPL CREATININE-BSD FRML MDRD: 54 ML/MIN/1.73SQ M
GLUCOSE P FAST SERPL-MCNC: 99 MG/DL (ref 65–99)
HCT VFR BLD AUTO: 32.8 % (ref 36.5–49.3)
HDLC SERPL-MCNC: 97 MG/DL (ref 40–60)
HGB BLD-MCNC: 10.4 G/DL (ref 12–17)
IMM GRANULOCYTES # BLD AUTO: 0.01 THOUSAND/UL (ref 0–0.2)
IMM GRANULOCYTES NFR BLD AUTO: 0 % (ref 0–2)
LDLC SERPL CALC-MCNC: 60 MG/DL (ref 0–100)
LYMPHOCYTES # BLD AUTO: 1.53 THOUSANDS/ΜL (ref 0.6–4.47)
LYMPHOCYTES NFR BLD AUTO: 28 % (ref 14–44)
MCH RBC QN AUTO: 34.3 PG (ref 26.8–34.3)
MCHC RBC AUTO-ENTMCNC: 31.7 G/DL (ref 31.4–37.4)
MCV RBC AUTO: 108 FL (ref 82–98)
MONOCYTES # BLD AUTO: 0.6 THOUSAND/ΜL (ref 0.17–1.22)
MONOCYTES NFR BLD AUTO: 11 % (ref 4–12)
NEUTROPHILS # BLD AUTO: 2.56 THOUSANDS/ΜL (ref 1.85–7.62)
NEUTS SEG NFR BLD AUTO: 45 % (ref 43–75)
NRBC BLD AUTO-RTO: 0 /100 WBCS
PLATELET # BLD AUTO: 136 THOUSANDS/UL (ref 149–390)
PMV BLD AUTO: 11.5 FL (ref 8.9–12.7)
POTASSIUM SERPL-SCNC: 4.5 MMOL/L (ref 3.5–5.3)
PROT SERPL-MCNC: 8 G/DL (ref 6.4–8.2)
RBC # BLD AUTO: 3.03 MILLION/UL (ref 3.88–5.62)
SODIUM SERPL-SCNC: 142 MMOL/L (ref 136–145)
TRIGL SERPL-MCNC: 61 MG/DL
WBC # BLD AUTO: 5.53 THOUSAND/UL (ref 4.31–10.16)

## 2019-10-07 PROCEDURE — 80053 COMPREHEN METABOLIC PANEL: CPT

## 2019-10-07 PROCEDURE — 36415 COLL VENOUS BLD VENIPUNCTURE: CPT

## 2019-10-07 PROCEDURE — 80061 LIPID PANEL: CPT

## 2019-10-07 PROCEDURE — 85025 COMPLETE CBC W/AUTO DIFF WBC: CPT

## 2019-10-07 RX ORDER — ATORVASTATIN CALCIUM 20 MG/1
20 TABLET, FILM COATED ORAL EVERY EVENING
Qty: 90 TABLET | Refills: 1 | Status: SHIPPED | OUTPATIENT
Start: 2019-10-07 | End: 2020-05-13

## 2019-10-07 RX ORDER — ATORVASTATIN CALCIUM 20 MG/1
TABLET, FILM COATED ORAL
Qty: 30 TABLET | Refills: 3 | Status: SHIPPED | OUTPATIENT
Start: 2019-10-07 | End: 2019-10-07 | Stop reason: SDUPTHER

## 2019-10-21 ENCOUNTER — TRANSCRIBE ORDERS (OUTPATIENT)
Dept: LAB | Facility: CLINIC | Age: 74
End: 2019-10-21

## 2019-10-21 ENCOUNTER — APPOINTMENT (OUTPATIENT)
Dept: LAB | Facility: CLINIC | Age: 74
End: 2019-10-21
Payer: MEDICARE

## 2019-10-21 DIAGNOSIS — D53.9 SIMPLE CHRONIC ANEMIA: ICD-10-CM

## 2019-10-21 DIAGNOSIS — D51.9 ANEMIA DUE TO VITAMIN B12 DEFICIENCY, UNSPECIFIED B12 DEFICIENCY TYPE: ICD-10-CM

## 2019-10-21 DIAGNOSIS — D50.9 IRON DEFICIENCY ANEMIA, UNSPECIFIED IRON DEFICIENCY ANEMIA TYPE: Primary | ICD-10-CM

## 2019-10-21 DIAGNOSIS — D50.9 IRON DEFICIENCY ANEMIA, UNSPECIFIED IRON DEFICIENCY ANEMIA TYPE: ICD-10-CM

## 2019-10-21 LAB
FERRITIN SERPL-MCNC: 143 NG/ML (ref 8–388)
FOLATE SERPL-MCNC: 14.5 NG/ML (ref 3.1–17.5)
IRON SERPL-MCNC: 125 UG/DL (ref 65–175)
TIBC SERPL-MCNC: 285 UG/DL (ref 250–450)
VIT B12 SERPL-MCNC: 231 PG/ML (ref 100–900)

## 2019-10-21 PROCEDURE — 83540 ASSAY OF IRON: CPT

## 2019-10-21 PROCEDURE — 36415 COLL VENOUS BLD VENIPUNCTURE: CPT

## 2019-10-21 PROCEDURE — 82746 ASSAY OF FOLIC ACID SERUM: CPT

## 2019-10-21 PROCEDURE — 83550 IRON BINDING TEST: CPT

## 2019-10-21 PROCEDURE — 82728 ASSAY OF FERRITIN: CPT

## 2019-10-21 PROCEDURE — 82607 VITAMIN B-12: CPT

## 2019-12-26 ENCOUNTER — OFFICE VISIT (OUTPATIENT)
Dept: CARDIOLOGY CLINIC | Facility: CLINIC | Age: 74
End: 2019-12-26
Payer: MEDICARE

## 2019-12-26 VITALS
HEIGHT: 68 IN | HEART RATE: 59 BPM | BODY MASS INDEX: 25.76 KG/M2 | DIASTOLIC BLOOD PRESSURE: 56 MMHG | OXYGEN SATURATION: 99 % | WEIGHT: 170 LBS | SYSTOLIC BLOOD PRESSURE: 104 MMHG

## 2019-12-26 DIAGNOSIS — J44.9 COPD, SEVERE (HCC): Primary | ICD-10-CM

## 2019-12-26 DIAGNOSIS — I10 ESSENTIAL HYPERTENSION: ICD-10-CM

## 2019-12-26 DIAGNOSIS — Z86.73 HISTORY OF STROKE: ICD-10-CM

## 2019-12-26 DIAGNOSIS — I42.8 NICM (NONISCHEMIC CARDIOMYOPATHY) (HCC): ICD-10-CM

## 2019-12-26 PROCEDURE — 99214 OFFICE O/P EST MOD 30 MIN: CPT | Performed by: INTERNAL MEDICINE

## 2019-12-26 NOTE — PROGRESS NOTES
Cardiology   Sharla Pretty 69 y o  male MRN: [id number]        Impression:  1  Non-ischemic cardiomyopathy - patient with NYHA class III systolic heart failure  EF 10%  Not interested in ICD/BiV - even if would improve symptoms   Does not want to have any major intervention  Appears more compensated at this time      2  Hypertension - controlled  3  Tobacco usage - quit  4  COPD - started on Ellipita   Appreciate Pulmonary input  5  Word finding difficulties - Likely from CVA         Recommendations:  1  Continue current medications  2  Follow up in 4 months  HPI: Sharla Pretty is a 69y o  year old male with non-ischemic cardiomyopathy returns for follow up   He saw Dr Hilario Fall for advanced heart failure - not interested in palliative care at this point    Currently having aphasia  Breathing improving  Has been losing weight  No chest pain or palpitations  Review of Systems   Constitutional: Negative  HENT: Negative  Eyes: Negative  Respiratory: Negative for chest tightness and shortness of breath  Cardiovascular: Negative for chest pain, palpitations and leg swelling  Gastrointestinal: Negative  Endocrine: Negative  Genitourinary: Negative  Musculoskeletal: Negative  Skin: Negative  Allergic/Immunologic: Negative  Neurological: Positive for speech difficulty  Hematological: Negative  Psychiatric/Behavioral: Negative  All other systems reviewed and are negative          Past Medical History:   Diagnosis Date    Cardiomyopathy [facility])     [facility]     COPD [facility]chronic obstructive pulmonary disease[facility]     Dyspnea on exertion     Essential hypertension     Non-ischemic cardiomyopathy (HCC)      Past Surgical History:   Procedure Laterality Date    PILONIDAL CYST EXCISION       Social History     Substance and Sexual Activity   Alcohol Use Yes    Alcohol/week: 2 0 standard drinks    Types: 2 Cans of beer per week    Binge frequency: Less than monthly    Comment: occasion     Social History     Substance and Sexual Activity   Drug Use No     Social History     Tobacco Use   Smoking Status Former Smoker    Packs/day: 1 00    Years: 60 00    Pack years: 60 00    Types: Cigarettes    Last attempt to quit: 2019    Years since quittin 9   Smokeless Tobacco Never Used   Tobacco Comment    Two cigarettes a day with lunch and dinner      Family History   Problem Relation Age of Onset    Breast cancer Mother 55    Hypertension Father     Cancer Father     Cancer Family     Heart disease Maternal Uncle     Coronary artery disease Maternal Uncle     Heart failure Maternal Uncle     Breast cancer Sister 48    Heart attack Neg Hx     Stroke Neg Hx     Anuerysm Neg Hx     Arrhythmia Neg Hx     Clotting disorder Neg Hx     Hyperlipidemia Neg Hx        Allergies:  No Known Allergies    Medications:     Current Outpatient Medications:     albuterol (VENTOLIN HFA) 90 mcg/act inhaler, Inhale 2 puffs every 6 (six) hours as needed for wheezing, Disp: 18 g, Rfl: 0    aspirin (ECOTRIN LOW STRENGTH) 81 mg EC tablet, Take 81 mg by mouth daily, Disp: , Rfl:     atorvastatin (LIPITOR) 20 mg tablet, Take 1 tablet (20 mg total) by mouth every evening, Disp: 90 tablet, Rfl: 1    bisoprolol (ZEBETA) 5 mg tablet, Take 0 5 tablets (2 5 mg total) by mouth every 12 (twelve) hours, Disp: 180 tablet, Rfl: 3    furosemide (LASIX) 20 mg tablet, Take 2 tablets in the morning and 1 tablet in the afternoon for a total of 60 mg per day , Disp: 360 tablet, Rfl: 3    sacubitril-valsartan (ENTRESTO)  MG TABS, Take 1 tablet by mouth 2 (two) times a day, Disp: 180 tablet, Rfl: 3    umeclidinium bromide (INCRUSE ELLIPTA) 62 5 mcg/inh AEPB inhaler, Inhale 1 puff daily, Disp: 3 Inhaler, Rfl: 3      Wt Readings from Last 3 Encounters:   19 77 1 kg (170 lb)   19 76 8 kg (169 lb 6 4 oz)   19 76 4 kg (168 lb 8 oz)     Temp Readings from Last 3 Encounters:   08/26/19 97 8 °F (36 6 °C) (Oral)   04/19/19 (!) 97 3 °F (36 3 °C) (Tympanic)   01/14/19 97 6 °F (36 4 °C) (Tympanic)     BP Readings from Last 3 Encounters:   12/26/19 104/56   08/26/19 110/70   08/08/19 120/72     Pulse Readings from Last 3 Encounters:   12/26/19 59   08/26/19 67   08/08/19 69         Physical Exam   Constitutional: He is oriented to person, place, and time  He appears well-developed  HENT:   Head: Atraumatic  Eyes: EOM are normal    Neck: Normal range of motion  Cardiovascular: Normal rate, regular rhythm and normal heart sounds  Exam reveals no gallop  No murmur heard  Pulmonary/Chest: Effort normal and breath sounds normal    Abdominal: Soft  Musculoskeletal: Normal range of motion  Neurological: He is alert and oriented to person, place, and time  Skin: Skin is warm and dry  Psychiatric: He has a normal mood and affect           Laboratory Studies:  CMP:  Lab Results   Component Value Date     10/22/2015    K 4 5 10/07/2019     10/07/2019    CO2 29 10/07/2019    ANIONGAP 3 (L) 10/22/2015    BUN 29 (H) 10/07/2019    CREATININE 1 32 (H) 10/07/2019    GLUCOSE 100 10/22/2015    AST 14 10/07/2019    ALT 19 10/07/2019    BILITOT 0 69 10/22/2015    EGFR 54 10/07/2019       Lipid Profile:   Lab Results   Component Value Date    CHOL 190 10/22/2015     Lab Results   Component Value Date    HDL 97 (H) 10/07/2019     Lab Results   Component Value Date    LDLCALC 60 10/07/2019     Lab Results   Component Value Date    TRIG 61 10/07/2019       Cardiac testing:     Results for orders placed during the hospital encounter of 01/23/19   Echo complete with contrast if indicated    Narrative [facility] [facility]  [phone]    Transthoracic Echocardiogram  2D, M-mode, Doppler, and Color Doppler    Study date:  23-Jan-2019    Patient: Jani Gardiner  MR number: [id number]  Account number: [id number]  : 1945  Age: 69 years  Gender: Male  Status: Outpatient  Location: [address] [place] [address]  Height: 68 in  Weight: 164 6 lb  BP: 110/ 60 mmHg    Indications: Assess left ventricular function  Diagnoses: R06 02 - Shortness of breath    Sonographer:  LENIN Jha  Primary Physician:  Pily Arias DO  Referring Physician:  Dodie Toro MD  Group:  [facility]  Interpreting Physician:  María Goode MD    SUMMARY    LEFT VENTRICLE:  The ventricle was dilated  Systolic function was severely reduced  Ejection fraction was estimated to be 10 %  There was severe diffuse hypokinesis  Doppler parameters were consistent with restrictive physiology, indicative of decreased left ventricular diastolic compliance and/or increased left atrial pressure  Doppler parameters were consistent with high ventricular filling pressure  RIGHT VENTRICLE:  The ventricle was dilated  LEFT ATRIUM:  The atrium was dilated  RIGHT ATRIUM:  The atrium was dilated  MITRAL VALVE:  There was mild to moderate regurgitation  TRICUSPID VALVE:  There was moderate regurgitation  Estimated peak PA pressure was 51 mmHg  The findings suggest mild to moderate pulmonary hypertension  PULMONIC VALVE:  There was mild regurgitation  AORTA:  The root exhibited normal size and mild fibrocalcific change  HISTORY: PRIOR HISTORY: Cardiomyopathy, COPD, Dyspnea of exertion, CVA, Tobacco abuse    PROCEDURE: The study was performed in the [address] [place] [address]  This was a routine study  The transthoracic approach was used  The study included complete 2D imaging, M-mode, complete spectral Doppler, and color Doppler  The  heart rate was 110 bpm, at the start of the study  Images were obtained from the parasternal, apical, subcostal, and suprasternal notch acoustic windows  Image quality was adequate  LEFT VENTRICLE: The ventricle was dilated   Systolic function was severely reduced  Ejection fraction was estimated to be 10 %  There was severe diffuse hypokinesis  Wall thickness was normal  DOPPLER: There was a reduced contribution of  atrial contraction to ventricular filling, due to increased ventricular diastolic pressure or atrial contractile dysfunction  Doppler parameters were consistent with restrictive physiology, indicative of decreased left ventricular  diastolic compliance and/or increased left atrial pressure  Doppler parameters were consistent with high ventricular filling pressure  RIGHT VENTRICLE: The ventricle was dilated  Systolic function was normal  Wall thickness was normal     LEFT ATRIUM: The atrium was dilated  RIGHT ATRIUM: The atrium was dilated  MITRAL VALVE: Valve structure was normal  There was normal leaflet separation  DOPPLER: The transmitral velocity was within the normal range  There was no evidence for stenosis  There was mild to moderate regurgitation  AORTIC VALVE: The valve was trileaflet  Leaflets exhibited mild calcification, normal cuspal separation, and sclerosis  DOPPLER: Transaortic velocity was within the normal range  There was no evidence for stenosis  There was trace  regurgitation  TRICUSPID VALVE: The valve structure was normal  There was normal leaflet separation  DOPPLER: The transtricuspid velocity was within the normal range  There was no evidence for stenosis  There was moderate regurgitation  Estimated peak PA  pressure was 51 mmHg  The findings suggest mild to moderate pulmonary hypertension  PULMONIC VALVE: Leaflets exhibited normal thickness, no calcification, and normal cuspal separation  DOPPLER: The transpulmonic velocity was within the normal range  There was mild regurgitation  PERICARDIUM: There was no pericardial effusion  The pericardium was normal in appearance  AORTA: The root exhibited normal size and mild fibrocalcific change      SYSTEMIC VEINS: IVC: The inferior vena cava was normal in size   Respirophasic changes were normal     SYSTEM MEASUREMENT TABLES    2D  %FS: 9 09 %  AV Diam: 3 56 cm  EDV(Teich): 230 97 ml  EF(Cube): 24 87 %  EF(Teich): 19 48 %  ESV(Cube): 225 46 ml  ESV(Teich): 185 97 ml  IVSd: 0 8 cm  LA Area: 36 38 cm2  LA Diam: 4 67 cm  LVEDV MOD A4C: 209 89 ml  LVEF MOD A4C: 18 83 %  LVESV MOD A4C: 170 37 ml  LVIDd: 6 7 cm  LVIDs: 6 09 cm  LVLd A4C: 9 3 cm  LVLs A4C: 8 79 cm  LVPWd: 0 82 cm  RA Area: 25 5 cm2  RV Diam: 5 17 cm  SI(Cube): 39 49 ml/m2  SI(Teich): 23 81 ml/m2  SV MOD A4C: 39 51 ml  SV(Cube): 74 64 ml  SV(Teich): 45 ml    CW  TR MaxP 49 mmHg  TR Vmax: 3 29 m/s    MM  TAPSE: 2 73 cm    PW  AVC: 406 66 ms    [employer] Accredited Echocardiography Laboratory    Prepared and electronically signed by    Shine Red MD  Signed 2019 14:56:03

## 2020-01-26 DIAGNOSIS — J44.9 COPD, SEVERE (HCC): ICD-10-CM

## 2020-01-26 RX ORDER — UMECLIDINIUM 62.5 UG/1
AEROSOL, POWDER ORAL
Qty: 90 EACH | Refills: 0 | Status: SHIPPED | OUTPATIENT
Start: 2020-01-26 | End: 2020-03-09 | Stop reason: SDUPTHER

## 2020-02-23 DIAGNOSIS — I42.8 NICM (NONISCHEMIC CARDIOMYOPATHY) (HCC): ICD-10-CM

## 2020-02-24 RX ORDER — SACUBITRIL AND VALSARTAN 97; 103 MG/1; MG/1
TABLET, FILM COATED ORAL
Qty: 180 TABLET | Refills: 3 | Status: SHIPPED | OUTPATIENT
Start: 2020-02-24 | End: 2021-04-02 | Stop reason: SDUPTHER

## 2020-03-09 ENCOUNTER — OFFICE VISIT (OUTPATIENT)
Dept: PULMONOLOGY | Facility: CLINIC | Age: 75
End: 2020-03-09
Payer: MEDICARE

## 2020-03-09 DIAGNOSIS — Z72.0 TOBACCO ABUSE: Primary | ICD-10-CM

## 2020-03-09 DIAGNOSIS — I42.8 NICM (NONISCHEMIC CARDIOMYOPATHY) (HCC): ICD-10-CM

## 2020-03-09 DIAGNOSIS — R91.1 PULMONARY NODULE: ICD-10-CM

## 2020-03-09 DIAGNOSIS — J44.9 COPD, SEVERE (HCC): ICD-10-CM

## 2020-03-09 DIAGNOSIS — R47.01 APHASIA: ICD-10-CM

## 2020-03-09 PROCEDURE — 99213 OFFICE O/P EST LOW 20 MIN: CPT | Performed by: INTERNAL MEDICINE

## 2020-03-09 RX ORDER — ALBUTEROL SULFATE 90 UG/1
2 AEROSOL, METERED RESPIRATORY (INHALATION) EVERY 6 HOURS PRN
Qty: 18 G | Refills: 2 | Status: SHIPPED | OUTPATIENT
Start: 2020-03-09 | End: 2020-11-10

## 2020-03-09 NOTE — PROGRESS NOTES
Pulmonary Follow Up Note   Sigrid Nieves 70 y o  male MRN: [id number]  3/9/2020      Assessment:  1  Dyspnea on Exertion - resolved  · Clinically improved, no change in regimen     2  Severe COPD  · GOLD Stage III, Class C - FEV1 1 06L 35% 10/2018  · Evidence of air trapping on PFTs  · Will continue with Incruse 1puff daily  · Encouraged prn use of CLAYTON  · Reviewed signs and symptoms of AECOPD and encouraged to call should these develop  · Flu Vaccine 2019, pneumovax 2017, asked to follow up with PCP regarding prevnar status  · Follow up in 6 months or sooner as needed     3  Pulmonary Nodule  · Multiple risk factors for malignancy  · CT/PET with minimal FDG avidity - SUV 1 4  · Decreased size on repeat CT Chest (July 2019_  · Repeat CT due 7/2020     4  Nicotine dependence in sustained remission  · Again encouraged him to remain smoke free     5  Non-ischemic Cardiomyopathy - no evidence of pulmonary edema, further care per Dr Jerene Nissen     6  Expressive Aphasia - further care per neurology  Plan:    Diagnoses and all orders for this visit:    Tobacco abuse in remission    COPD, severe (HCC)  -     umeclidinium bromide (Incruse Ellipta) 62 5 mcg/inh AEPB inhaler; Inhale 1 puff daily  -     albuterol (Ventolin HFA) 90 mcg/act inhaler; Inhale 2 puffs every 6 (six) hours as needed for wheezing    Pulmonary nodule  -     CT chest without contrast; Future    Aphasia    NICM (nonischemic cardiomyopathy) ([facility] [address] )        Return in about 6 months (around 9/9/2020) for Recheck  History of Present Illness   HPI:  Sigrid Nieves is a 70 y o  male who presented for dyspnea and COPD evaluation by Dr Jerene Nissen  He has history of NICM EF 10-20% and HTN    He was initially seen in Jan 2019 and diagnosed with severe COPD and did not require supplemental oxygen  He was last seen in August 2019 with plans to continue Anoro, and yearly CT chest   He presents today for follow up      He reports he has felt stable    He denied sputum production or hemoptysis  He uses CLAYTON 1-2 times a week  He reported no change with Anoro and has returned to Incruse  He denied LE edema, no orthopnea, no pleurisy  He denied and respiratory illnesses over the winter  Review of Systems   Constitutional: Negative for activity change, fatigue, fever and unexpected weight change  HENT: Negative for congestion, mouth sores, postnasal drip, rhinorrhea, trouble swallowing and voice change  Eyes: Negative for visual disturbance  Respiratory: Positive for shortness of breath  Negative for cough, chest tightness and wheezing  Cardiovascular: Negative for chest pain, palpitations and leg swelling  Gastrointestinal: Negative for abdominal pain, diarrhea, nausea and vomiting  Endocrine: Negative for cold intolerance and heat intolerance  Genitourinary: Negative for difficulty urinating and dysuria  Musculoskeletal: Negative for arthralgias and gait problem  Skin: Negative for rash  Allergic/Immunologic: Negative for immunocompromised state  Neurological: Positive for speech difficulty  Negative for syncope, weakness, light-headedness and headaches  Hematological: Negative for adenopathy  Psychiatric/Behavioral: Negative for sleep disturbance  The patient is not nervous/anxious          Historical Information   Past Medical History:   Diagnosis Date    Cardiomyopathy ([address] )     CHF (congestive heart failure) (HCC)     COPD (chronic obstructive pulmonary disease) (HCC)     Dyspnea on exertion     Essential hypertension     Non-ischemic cardiomyopathy (HCC)      Past Surgical History:   Procedure Laterality Date    PILONIDAL CYST EXCISION       Family History   Problem Relation Age of Onset    Breast cancer Mother 55    Hypertension Father     Cancer Father     Cancer Family     Heart disease Maternal Uncle     Coronary artery disease Maternal Uncle     Heart failure Maternal Uncle     Breast cancer Sister 48    Heart attack Neg Hx  Stroke Neg Hx     Anuerysm Neg Hx     Arrhythmia Neg Hx     Clotting disorder Neg Hx     Hyperlipidemia Neg Hx          Meds/Allergies     Current Outpatient Medications:     albuterol (Ventolin HFA) 90 mcg/act inhaler, Inhale 2 puffs every 6 (six) hours as needed for wheezing, Disp: 18 g, Rfl: 2    aspirin (ECOTRIN LOW STRENGTH) 81 mg EC tablet, Take 81 mg by mouth daily, Disp: , Rfl:     atorvastatin (LIPITOR) 20 mg tablet, Take 1 tablet (20 mg total) by mouth every evening, Disp: 90 tablet, Rfl: 1    bisoprolol (ZEBETA) 5 mg tablet, Take 0 5 tablets (2 5 mg total) by mouth every 12 (twelve) hours, Disp: 180 tablet, Rfl: 3    ENTRESTO  MG TABS, TAKE 1 TABLET BY MOUTH TWO  TIMES DAILY, Disp: 180 tablet, Rfl: 3    furosemide (LASIX) 20 mg tablet, Take 2 tablets in the morning and 1 tablet in the afternoon for a total of 60 mg per day , Disp: 360 tablet, Rfl: 3    umeclidinium bromide (Incruse Ellipta) 62 5 mcg/inh AEPB inhaler, Inhale 1 puff daily, Disp: 90 each, Rfl: 3  No Known Allergies    Vitals: There were no vitals taken for this visit  There is no height or weight on file to calculate BMI  Physical Exam  Physical Exam   Constitutional: He is oriented to person, place, and time  He appears well-developed and well-nourished  No distress  Stable dysarthria symptoms   HENT:   Head: Normocephalic and atraumatic  Right Ear: External ear normal    Left Ear: External ear normal    Nose: Nose normal    Mouth/Throat: No oropharyngeal exudate  Nearly edentulous, no thrush   Eyes: Pupils are equal, round, and reactive to light  Conjunctivae are normal  Right eye exhibits no discharge  Left eye exhibits no discharge  No scleral icterus  Neck: Neck supple  No JVD present  No tracheal deviation present  Cardiovascular: Normal rate, regular rhythm and normal heart sounds  No murmur heard  Pulmonary/Chest: Effort normal  No stridor  No respiratory distress  He has no wheezes   He has no rales    Mildly diminished BS diffusely, no wheeze or rales   Abdominal: Soft  Bowel sounds are normal  He exhibits no distension  There is no tenderness  Musculoskeletal: He exhibits no edema or deformity  Lymphadenopathy:     He has no cervical adenopathy  Neurological: He is alert and oriented to person, place, and time  Skin: Skin is warm and dry  No rash noted  Psychiatric: He has a normal mood and affect  His behavior is normal    Vitals reviewed  Labs: I have personally reviewed pertinent lab results    Lab Results   Component Value Date    WBC 5 53 10/07/2019    HGB 10 4 (L) 10/07/2019    HCT 32 8 (L) 10/07/2019     (H) 10/07/2019     (L) 10/07/2019     Lab Results   Component Value Date    GLUCOSE 100 10/22/2015    CALCIUM 9 5 10/07/2019     10/22/2015    K 4 5 10/07/2019    CO2 29 10/07/2019     10/07/2019    BUN 29 (H) 10/07/2019    CREATININE 1 32 (H) 10/07/2019     No results found for: IGE  Lab Results   Component Value Date    ALT 19 10/07/2019    AST 14 10/07/2019    ALKPHOS 85 10/07/2019    BILITOT 0 69 10/22/2015       Imaging and other studies: No new images  CT Chest 7/18/19 - resolved LLL pneumonia with resolving RLL nodule and likely residual scar, background upper lobe emphysematous changes, bilateral pleural plaques     CT/PET - 4/17/19 - 1 3x0 8cm RLL nodule with SUV 1 4, 4mm RLL nodule, slight nodular density LLL, no significant mediastinal adenopathy     CT Chest 4/5/19 - noted upper lobe predominant emphysematous changes with 1 2cm RLL spiculated nodule, trace right effusion within fissure, no significant mediastinal adenopathy     CXR 10/22/18 - hyperinflation, flat HD and enlarged anterior/posterior clear space, blunted right CP angle with obscured right HD and suggestion of basilar scarring, no PTX, mild volume loss on right     Pulmonary function testing:   10/22/18 - Ratio 47%, FVC 2 25L (55%), FEV1 1 06L (35%), FEV1 inc 1% post BD, TLC 88%, RV 138%, DLCO 19% - severe obstructive airflow defect with reduced VC, normal TLC with increased RV indicative of air trapping and severely reduced diffusion     01/14/19 - 6MWT on RA - total walk distance 397 meters, initial SpO2 97%, romeo SpO2 94%, at conclusion 95%, initial HR 85bpm, maximal HR 107bpm, Ruben Dyspnea Scale 0/10-->1/10     EKG, Pathology, and Other Studies: I have personally reviewed pertinent reports     TTE 1/2019 - TTE EF 10%, PASP 51mmHg, dilated RV     TTE 2014 - EF 20%, normal RV, PASP in normal range    Ross Banda DO, [facility]

## 2020-03-09 NOTE — PATIENT INSTRUCTIONS
· Continue Incruse 1 puff daily  · Use Albuterol 2 puffs every 6 hours as needed for shortness of breath  · Get CT chest in July 2020

## 2020-04-06 ENCOUNTER — TELEMEDICINE (OUTPATIENT)
Dept: NEUROLOGY | Facility: CLINIC | Age: 75
End: 2020-04-06
Payer: MEDICARE

## 2020-04-06 DIAGNOSIS — I10 ESSENTIAL HYPERTENSION: Primary | ICD-10-CM

## 2020-04-06 DIAGNOSIS — Z86.73 HISTORY OF STROKE: ICD-10-CM

## 2020-04-06 DIAGNOSIS — R47.81 SLURRED SPEECH: ICD-10-CM

## 2020-04-06 PROCEDURE — 99213 OFFICE O/P EST LOW 20 MIN: CPT | Performed by: PSYCHIATRY & NEUROLOGY

## 2020-05-13 DIAGNOSIS — Z86.73 HISTORY OF STROKE: ICD-10-CM

## 2020-05-13 RX ORDER — ATORVASTATIN CALCIUM 20 MG/1
TABLET, FILM COATED ORAL
Qty: 90 TABLET | Refills: 1 | Status: SHIPPED | OUTPATIENT
Start: 2020-05-13 | End: 2020-10-12

## 2020-05-26 ENCOUNTER — OFFICE VISIT (OUTPATIENT)
Dept: CARDIOLOGY CLINIC | Facility: CLINIC | Age: 75
End: 2020-05-26
Payer: MEDICARE

## 2020-05-26 VITALS
WEIGHT: 174.4 LBS | HEART RATE: 62 BPM | SYSTOLIC BLOOD PRESSURE: 120 MMHG | BODY MASS INDEX: 26.43 KG/M2 | HEIGHT: 68 IN | OXYGEN SATURATION: 97 % | DIASTOLIC BLOOD PRESSURE: 60 MMHG

## 2020-05-26 DIAGNOSIS — I10 ESSENTIAL HYPERTENSION: ICD-10-CM

## 2020-05-26 DIAGNOSIS — I42.8 NICM (NONISCHEMIC CARDIOMYOPATHY) (HCC): ICD-10-CM

## 2020-05-26 DIAGNOSIS — J44.9 COPD, SEVERE (HCC): Primary | ICD-10-CM

## 2020-05-26 PROCEDURE — 99214 OFFICE O/P EST MOD 30 MIN: CPT | Performed by: INTERNAL MEDICINE

## 2020-06-17 DIAGNOSIS — I42.9 CARDIOMYOPATHY, UNSPECIFIED TYPE (HCC): ICD-10-CM

## 2020-06-17 DIAGNOSIS — I42.8 NICM (NONISCHEMIC CARDIOMYOPATHY) (HCC): ICD-10-CM

## 2020-06-17 RX ORDER — BISOPROLOL FUMARATE 5 MG/1
TABLET ORAL
Qty: 90 TABLET | Refills: 3 | Status: SHIPPED | OUTPATIENT
Start: 2020-06-17 | End: 2021-05-27 | Stop reason: SDUPTHER

## 2020-06-17 RX ORDER — FUROSEMIDE 20 MG/1
TABLET ORAL
Qty: 270 TABLET | Refills: 3 | Status: SHIPPED | OUTPATIENT
Start: 2020-06-17 | End: 2021-07-13 | Stop reason: SDUPTHER

## 2020-07-06 ENCOUNTER — HOSPITAL ENCOUNTER (OUTPATIENT)
Dept: CT IMAGING | Facility: HOSPITAL | Age: 75
Discharge: HOME | End: 2020-07-06
Attending: INTERNAL MEDICINE
Payer: MEDICARE

## 2020-07-06 DIAGNOSIS — R91.1 PULMONARY NODULE: ICD-10-CM

## 2020-07-06 PROCEDURE — 71250 CT THORAX DX C-: CPT

## 2020-07-09 ENCOUNTER — TELEPHONE (OUTPATIENT)
Dept: PULMONOLOGY | Facility: CLINIC | Age: 75
End: 2020-07-09

## 2020-07-13 DIAGNOSIS — R91.1 PULMONARY NODULE: Primary | ICD-10-CM

## 2020-10-12 ENCOUNTER — OFFICE VISIT (OUTPATIENT)
Dept: PULMONOLOGY | Facility: CLINIC | Age: 75
End: 2020-10-12
Payer: MEDICARE

## 2020-10-12 VITALS
DIASTOLIC BLOOD PRESSURE: 60 MMHG | TEMPERATURE: 98.3 F | RESPIRATION RATE: 18 BRPM | SYSTOLIC BLOOD PRESSURE: 112 MMHG | WEIGHT: 175.2 LBS | HEIGHT: 68 IN | HEART RATE: 67 BPM | BODY MASS INDEX: 26.55 KG/M2 | OXYGEN SATURATION: 97 %

## 2020-10-12 DIAGNOSIS — Z23 NEEDS FLU SHOT: Primary | ICD-10-CM

## 2020-10-12 DIAGNOSIS — Z72.0 TOBACCO ABUSE: ICD-10-CM

## 2020-10-12 DIAGNOSIS — J44.9 COPD, SEVERE (HCC): ICD-10-CM

## 2020-10-12 DIAGNOSIS — Z86.73 HISTORY OF STROKE: ICD-10-CM

## 2020-10-12 DIAGNOSIS — I42.8 NICM (NONISCHEMIC CARDIOMYOPATHY) (HCC): ICD-10-CM

## 2020-10-12 DIAGNOSIS — R06.00 DYSPNEA ON EXERTION: ICD-10-CM

## 2020-10-12 DIAGNOSIS — R91.1 PULMONARY NODULE: ICD-10-CM

## 2020-10-12 PROCEDURE — 99214 OFFICE O/P EST MOD 30 MIN: CPT | Performed by: INTERNAL MEDICINE

## 2020-10-12 PROCEDURE — G0008 ADMIN INFLUENZA VIRUS VAC: HCPCS

## 2020-10-12 PROCEDURE — 90662 IIV NO PRSV INCREASED AG IM: CPT

## 2020-10-12 RX ORDER — ATORVASTATIN CALCIUM 20 MG/1
TABLET, FILM COATED ORAL
Qty: 90 TABLET | Refills: 3 | Status: SHIPPED | OUTPATIENT
Start: 2020-10-12 | End: 2021-11-22

## 2020-11-10 DIAGNOSIS — J44.9 COPD, SEVERE (HCC): ICD-10-CM

## 2021-02-13 DIAGNOSIS — Z23 ENCOUNTER FOR IMMUNIZATION: ICD-10-CM

## 2021-02-20 ENCOUNTER — IMMUNIZATIONS (OUTPATIENT)
Dept: FAMILY MEDICINE CLINIC | Facility: HOSPITAL | Age: 76
End: 2021-02-20

## 2021-02-20 DIAGNOSIS — Z23 ENCOUNTER FOR IMMUNIZATION: Primary | ICD-10-CM

## 2021-02-20 PROCEDURE — 0001A SARS-COV-2 / COVID-19 MRNA VACCINE (PFIZER-BIONTECH) 30 MCG: CPT

## 2021-02-20 PROCEDURE — 91300 SARS-COV-2 / COVID-19 MRNA VACCINE (PFIZER-BIONTECH) 30 MCG: CPT

## 2021-03-05 DIAGNOSIS — J44.9 COPD, SEVERE (HCC): ICD-10-CM

## 2021-03-08 RX ORDER — UMECLIDINIUM 62.5 UG/1
AEROSOL, POWDER ORAL
Qty: 90 EACH | Refills: 3 | Status: SHIPPED | OUTPATIENT
Start: 2021-03-08 | End: 2022-02-06

## 2021-03-10 DIAGNOSIS — I42.8 NICM (NONISCHEMIC CARDIOMYOPATHY) (HCC): ICD-10-CM

## 2021-03-10 RX ORDER — SACUBITRIL AND VALSARTAN 97; 103 MG/1; MG/1
1 TABLET, FILM COATED ORAL 2 TIMES DAILY
Qty: 180 TABLET | Refills: 3 | Status: CANCELLED | OUTPATIENT
Start: 2021-03-10

## 2021-03-11 ENCOUNTER — IMMUNIZATIONS (OUTPATIENT)
Dept: FAMILY MEDICINE CLINIC | Facility: HOSPITAL | Age: 76
End: 2021-03-11

## 2021-03-11 DIAGNOSIS — Z23 ENCOUNTER FOR IMMUNIZATION: Primary | ICD-10-CM

## 2021-03-11 PROCEDURE — 0002A SARS-COV-2 / COVID-19 MRNA VACCINE (PFIZER-BIONTECH) 30 MCG: CPT

## 2021-03-11 PROCEDURE — 91300 SARS-COV-2 / COVID-19 MRNA VACCINE (PFIZER-BIONTECH) 30 MCG: CPT

## 2021-03-12 DIAGNOSIS — I42.8 NICM (NONISCHEMIC CARDIOMYOPATHY) (HCC): ICD-10-CM

## 2021-03-12 RX ORDER — SACUBITRIL AND VALSARTAN 97; 103 MG/1; MG/1
1 TABLET, FILM COATED ORAL 2 TIMES DAILY
Qty: 180 TABLET | Refills: 3 | Status: CANCELLED | OUTPATIENT
Start: 2021-03-12

## 2021-04-02 DIAGNOSIS — I42.8 NICM (NONISCHEMIC CARDIOMYOPATHY) (HCC): ICD-10-CM

## 2021-04-02 RX ORDER — SACUBITRIL AND VALSARTAN 97; 103 MG/1; MG/1
1 TABLET, FILM COATED ORAL 2 TIMES DAILY
Qty: 180 TABLET | Refills: 3 | Status: SHIPPED | OUTPATIENT
Start: 2021-04-02 | End: 2022-02-20

## 2021-04-20 ENCOUNTER — TRANSCRIBE ORDERS (OUTPATIENT)
Dept: LAB | Facility: CLINIC | Age: 76
End: 2021-04-20

## 2021-04-20 ENCOUNTER — APPOINTMENT (OUTPATIENT)
Dept: LAB | Facility: CLINIC | Age: 76
End: 2021-04-20
Payer: MEDICARE

## 2021-04-20 DIAGNOSIS — E78.2 MIXED HYPERLIPIDEMIA: ICD-10-CM

## 2021-04-20 DIAGNOSIS — E11.9 DIABETES MELLITUS WITHOUT COMPLICATION (HCC): ICD-10-CM

## 2021-04-20 DIAGNOSIS — D64.9 ANEMIA, UNSPECIFIED TYPE: ICD-10-CM

## 2021-04-20 DIAGNOSIS — E03.9 HYPOTHYROIDISM, ACQUIRED: ICD-10-CM

## 2021-04-20 DIAGNOSIS — E78.2 MIXED HYPERLIPIDEMIA: Primary | ICD-10-CM

## 2021-04-20 LAB
ALBUMIN SERPL BCP-MCNC: 2.8 G/DL (ref 3.5–5)
ALP SERPL-CCNC: 68 U/L (ref 46–116)
ALT SERPL W P-5'-P-CCNC: 16 U/L (ref 12–78)
ANION GAP SERPL CALCULATED.3IONS-SCNC: 9 MMOL/L (ref 4–13)
AST SERPL W P-5'-P-CCNC: 14 U/L (ref 5–45)
BASOPHILS # BLD AUTO: 0.01 THOUSANDS/ΜL (ref 0–0.1)
BASOPHILS NFR BLD AUTO: 0 % (ref 0–1)
BILIRUB SERPL-MCNC: 0.42 MG/DL (ref 0.2–1)
BUN SERPL-MCNC: 35 MG/DL (ref 5–25)
CALCIUM ALBUM COR SERPL-MCNC: 9.6 MG/DL (ref 8.3–10.1)
CALCIUM SERPL-MCNC: 8.6 MG/DL (ref 8.3–10.1)
CHLORIDE SERPL-SCNC: 104 MMOL/L (ref 100–108)
CHOLEST SERPL-MCNC: 179 MG/DL (ref 50–200)
CO2 SERPL-SCNC: 27 MMOL/L (ref 21–32)
CREAT SERPL-MCNC: 1.63 MG/DL (ref 0.6–1.3)
CREAT UR-MCNC: 89 MG/DL
EOSINOPHIL # BLD AUTO: 0.43 THOUSAND/ΜL (ref 0–0.61)
EOSINOPHIL NFR BLD AUTO: 11 % (ref 0–6)
ERYTHROCYTE [DISTWIDTH] IN BLOOD BY AUTOMATED COUNT: 13.6 % (ref 11.6–15.1)
EST. AVERAGE GLUCOSE BLD GHB EST-MCNC: 111 MG/DL
GFR SERPL CREATININE-BSD FRML MDRD: 41 ML/MIN/1.73SQ M
GLUCOSE P FAST SERPL-MCNC: 98 MG/DL (ref 65–99)
HBA1C MFR BLD: 5.5 %
HCT VFR BLD AUTO: 28.7 % (ref 36.5–49.3)
HDLC SERPL-MCNC: 98 MG/DL
HGB BLD-MCNC: 9.4 G/DL (ref 12–17)
IMM GRANULOCYTES # BLD AUTO: 0.01 THOUSAND/UL (ref 0–0.2)
IMM GRANULOCYTES NFR BLD AUTO: 0 % (ref 0–2)
LDLC SERPL CALC-MCNC: 71 MG/DL (ref 0–100)
LYMPHOCYTES # BLD AUTO: 1.58 THOUSANDS/ΜL (ref 0.6–4.47)
LYMPHOCYTES NFR BLD AUTO: 39 % (ref 14–44)
MCH RBC QN AUTO: 35.5 PG (ref 26.8–34.3)
MCHC RBC AUTO-ENTMCNC: 32.8 G/DL (ref 31.4–37.4)
MCV RBC AUTO: 108 FL (ref 82–98)
MICROALBUMIN UR-MCNC: 7.9 MG/L (ref 0–20)
MICROALBUMIN/CREAT 24H UR: 9 MG/G CREATININE (ref 0–30)
MONOCYTES # BLD AUTO: 0.42 THOUSAND/ΜL (ref 0.17–1.22)
MONOCYTES NFR BLD AUTO: 10 % (ref 4–12)
NEUTROPHILS # BLD AUTO: 1.62 THOUSANDS/ΜL (ref 1.85–7.62)
NEUTS SEG NFR BLD AUTO: 40 % (ref 43–75)
NONHDLC SERPL-MCNC: 81 MG/DL
NRBC BLD AUTO-RTO: 0 /100 WBCS
PLATELET # BLD AUTO: 94 THOUSANDS/UL (ref 149–390)
PMV BLD AUTO: 11 FL (ref 8.9–12.7)
POTASSIUM SERPL-SCNC: 4.9 MMOL/L (ref 3.5–5.3)
PROT SERPL-MCNC: 7.6 G/DL (ref 6.4–8.2)
RBC # BLD AUTO: 2.65 MILLION/UL (ref 3.88–5.62)
SODIUM SERPL-SCNC: 140 MMOL/L (ref 136–145)
TRIGL SERPL-MCNC: 50 MG/DL
TSH SERPL DL<=0.05 MIU/L-ACNC: 1.72 UIU/ML (ref 0.36–3.74)
WBC # BLD AUTO: 4.07 THOUSAND/UL (ref 4.31–10.16)

## 2021-04-20 PROCEDURE — 83036 HEMOGLOBIN GLYCOSYLATED A1C: CPT | Performed by: FAMILY MEDICINE

## 2021-04-20 PROCEDURE — 36415 COLL VENOUS BLD VENIPUNCTURE: CPT | Performed by: FAMILY MEDICINE

## 2021-04-20 PROCEDURE — 85025 COMPLETE CBC W/AUTO DIFF WBC: CPT

## 2021-04-20 PROCEDURE — 80053 COMPREHEN METABOLIC PANEL: CPT

## 2021-04-20 PROCEDURE — 80061 LIPID PANEL: CPT

## 2021-04-20 PROCEDURE — 82570 ASSAY OF URINE CREATININE: CPT | Performed by: FAMILY MEDICINE

## 2021-04-20 PROCEDURE — 84443 ASSAY THYROID STIM HORMONE: CPT

## 2021-04-20 PROCEDURE — 82043 UR ALBUMIN QUANTITATIVE: CPT | Performed by: FAMILY MEDICINE

## 2021-04-28 ENCOUNTER — TELEPHONE (OUTPATIENT)
Dept: HEMATOLOGY ONCOLOGY | Facility: CLINIC | Age: 76
End: 2021-04-28

## 2021-04-29 NOTE — PROGRESS NOTES
Pulmonary Follow Up Note   Jessica Valles 78 y o  male MRN: [id number]  4/30/2021      Assessment:  1  Dyspnea on Exertion   · Encouraged regular attempts at exercise/walking  · Consider pulmonary rehab in the future     2  Severe COPD  · GOLD Stage III, Class C - FEV1 1 06L 35% 10/2018  · Evidence of air trapping on PFTs  · Continue Incruse Ellipta for now  · Encouraged prn use of CLAYTON  · Reviewed signs and symptoms of AECOPD and encouraged to call should these develop  · Flu Vaccine 2020, pneumovax 2017, prevnar 2015, COVID-19 x 2  · Follow up in 6 months or sooner as needed     3  Pulmonary Nodule  · Multiple risk factors for malignancy  · CT/PET with minimal FDG avidity - SUV 1 4  · Decreased size on repeat CT Chest (July 2019) and stable on 7/2020  · Repeat CT due 7/2021 and scheduling assistance provided today     4  Nicotine dependence in sustained remission  · Again encouraged him to remain smoke free     5  Non-ischemic Cardiomyopathy EF 10% 2019 - no edema, given rising SCr, I advised repeat cardiology evaluation     6  Expressive Aphasia - further care per neurology, referred to speech therapy    7  Pancytopenia - mild, await hematology evaluation  Plan:    Diagnoses and all orders for this visit:    COPD, severe ([address] )    NICM (nonischemic cardiomyopathy) ([address] )    Tobacco abuse in remission    History of stroke  -     Ambulatory referral to Speech Therapy; Future    Aphasia  -     Ambulatory referral to Speech Therapy; Future    Pulmonary nodule    Pancytopenia ([facility] [address] )        Return in about 6 months (around 10/30/2021)  History of Present Illness   HPI:  Jessica Valles is a 78 y o  male who presented for dyspnea and COPD evaluation by Dr David Hidalgo  He has history of NICM EF 10-20% and HTN    He was initially seen in Jan 2019 and diagnosed with severe COPD and did not require supplemental oxygen  He was last seen in March Oct 2020        He reports stable dyspnea walking to mailbox   He can climb stairs and perform ADLs w/o difficulty  He reported no change with Anoro  He uses CLAYTON once daily at night prior to bed and feels it helps him sleep, he denied nocturnal dyspnea  He denied pleurisy, sputum production, hemoptysis, or SSCP  He denied reflux symptoms  He has not seen his cardiologist since May 2020  He reports continued expressive aphasia  He has upcoming hematology evaluation for pancytopenia and noted increased SCr  Review of Systems   Constitutional: Negative for appetite change, fatigue and fever  HENT: Positive for postnasal drip, rhinorrhea and sneezing  Negative for ear pain, mouth sores, sore throat and trouble swallowing  Respiratory: Positive for shortness of breath and wheezing  Negative for cough and chest tightness  Cardiovascular: Negative for chest pain and leg swelling  Gastrointestinal: Negative for abdominal pain, nausea and vomiting  Musculoskeletal: Negative for myalgias  Neurological: Positive for speech difficulty  Negative for headaches  Psychiatric/Behavioral: Negative for sleep disturbance  The patient is not nervous/anxious          Historical Information   Past Medical History:   Diagnosis Date    Cardiomyopathy ([facility] [address] )     CHF (congestive heart failure) (HCC)     COPD (chronic obstructive pulmonary disease) (HCC)     Dyspnea on exertion     Essential hypertension     Non-ischemic cardiomyopathy (HCC)      Past Surgical History:   Procedure Laterality Date    PILONIDAL CYST EXCISION       Family History   Problem Relation Age of Onset    Breast cancer Mother 55    Hypertension Father     Cancer Father     Cancer Family     Heart disease Maternal Uncle     Coronary artery disease Maternal Uncle     Heart failure Maternal Uncle     Breast cancer Sister 48    Heart attack Neg Hx     Stroke Neg Hx     Anuerysm Neg Hx     Arrhythmia Neg Hx     Clotting disorder Neg Hx     Hyperlipidemia Neg Hx          Meds/Allergies     Current Outpatient Medications:     aspirin (ECOTRIN LOW STRENGTH) 81 mg EC tablet, Take 81 mg by mouth daily, Disp: , Rfl:     atorvastatin (LIPITOR) 20 mg tablet, TAKE 1 TABLET BY MOUTH IN  THE EVENING, Disp: 90 tablet, Rfl: 3    bisoprolol (ZEBETA) 5 mg tablet, TAKE ONE-HALF TABLET BY  MOUTH TWO TIMES DAILY, Disp: 90 tablet, Rfl: 3    furosemide (LASIX) 20 mg tablet, TAKE 2 TABLETS BY MOUTH  EVERY MORNING AND 1 TABLET  BY MOUTH IN THE AFTERNOON  FOR TOTAL OF 60MG PER DAY, Disp: 270 tablet, Rfl: 3    Incruse Ellipta 62 5 MCG/INH AEPB inhaler, USE 1 PUFF BY MOUTH DAILY, Disp: 90 each, Rfl: 3    sacubitril-valsartan (Entresto)  MG TABS, Take 1 tablet by mouth 2 (two) times a day, Disp: 180 tablet, Rfl: 3    Ventolin  (90 Base) MCG/ACT inhaler, USE 2 INHALATIONS BY MOUTH  EVERY 6 HOURS AS NEEDED FOR WHEEZING, Disp: 54 g, Rfl: 3  No Known Allergies    Vitals: Blood pressure 118/60, pulse 84, temperature 97 6 °F (36 4 °C), temperature source Tympanic, resp  rate 18, height 5' 8" (1 727 m), weight 75 8 kg (167 lb), SpO2 97 %  Body mass index is 25 39 kg/m²  Oxygen Therapy  SpO2: 97 %      Physical Exam  Physical Exam  Constitutional:       General: He is not in acute distress  Appearance: Normal appearance  He is well-developed and normal weight  He is not ill-appearing, toxic-appearing or diaphoretic  HENT:      Head: Normocephalic and atraumatic  Right Ear: External ear normal       Left Ear: External ear normal       Nose: Nose normal       Mouth/Throat:      Mouth: Mucous membranes are moist       Pharynx: Oropharynx is clear  No oropharyngeal exudate  Eyes:      General: No scleral icterus  Right eye: No discharge  Left eye: No discharge  Conjunctiva/sclera: Conjunctivae normal       Pupils: Pupils are equal, round, and reactive to light  Neck:      Musculoskeletal: Neck supple  Vascular: No JVD  Trachea: No tracheal deviation     Cardiovascular:      Rate and Rhythm: Normal rate and regular rhythm  Heart sounds: Normal heart sounds  No murmur  Pulmonary:      Effort: Pulmonary effort is normal  No respiratory distress  Breath sounds: No stridor  No wheezing, rhonchi or rales  Comments: Mildly diminished BS, no wheeze, no rales  Abdominal:      General: Bowel sounds are normal  There is no distension  Palpations: Abdomen is soft  Tenderness: There is no abdominal tenderness  There is no guarding  Musculoskeletal:         General: No swelling or deformity  Right lower leg: No edema  Left lower leg: No edema  Lymphadenopathy:      Cervical: No cervical adenopathy  Skin:     General: Skin is warm and dry  Coloration: Skin is not jaundiced  Findings: No rash  Neurological:      General: No focal deficit present  Mental Status: He is alert and oriented to person, place, and time  Psychiatric:         Mood and Affect: Mood normal          Behavior: Behavior normal          Thought Content: Thought content normal          Labs: I have personally reviewed pertinent lab results    Lab Results   Component Value Date    WBC 4 07 (L) 04/20/2021    HGB 9 4 (L) 04/20/2021    HCT 28 7 (L) 04/20/2021     (H) 04/20/2021    PLT 94 (L) 04/20/2021     Lab Results   Component Value Date    GLUCOSE 100 10/22/2015    CALCIUM 8 6 04/20/2021     10/22/2015    K 4 9 04/20/2021    CO2 27 04/20/2021     04/20/2021    BUN 35 (H) 04/20/2021    CREATININE 1 63 (H) 04/20/2021     No results found for: IGE  Lab Results   Component Value Date    ALT 16 04/20/2021    AST 14 04/20/2021    ALKPHOS 68 04/20/2021    BILITOT 0 69 10/22/2015     Imaging and other studies: No new images  CT Chest 7/6/2020 - background emphysema, stable subpleural scarring in RML and lingula, RLL subpleural nodule noted, bilateral calcified pleural plaques     CT Chest 7/18/19 - resolved LLL pneumonia with resolving RLL nodule and likely residual scar, background upper lobe emphysematous changes, bilateral pleural plaques     CT/PET - 4/17/19 - 1 3x0 8cm RLL nodule with SUV 1 4, 4mm RLL nodule, slight nodular density LLL, no significant mediastinal adenopathy     CT Chest 4/5/19 - noted upper lobe predominant emphysematous changes with 1 2cm RLL spiculated nodule, trace right effusion within fissure, no significant mediastinal adenopathy     CXR 10/22/18 - hyperinflation, flat HD and enlarged anterior/posterior clear space, blunted right CP angle with obscured right HD and suggestion of basilar scarring, no PTX, mild volume loss on right     Pulmonary function testing:   10/22/18 - Ratio 47%, FVC 2 25L (55%), FEV1 1 06L (35%), FEV1 inc 1% post BD, TLC 88%, %, DLCO 19% - severe obstructive airflow defect with reduced VC, normal TLC with increased RV indicative of air trapping and severely reduced diffusion     01/14/19 - 6MWT on RA - total walk distance 397 meters, initial SpO2 97%, romeo SpO2 94%, at conclusion 95%, initial HR 85bpm, maximal HR 107bpm, Ruben Dyspnea Scale 0/10-->1/10     EKG, Pathology, and Other Studies: I have personally reviewed pertinent reports     TTE 1/2019 - TTE EF 10%, PASP 51mmHg, dilated RV     TTE 2014 - EF 20%, normal RV, PASP in normal range    Ross Banda, DO, FACP  [facility]    Answers for HPI/ROS submitted by the patient on 4/26/2021   Primary symptoms  Chronicity: recurrent  When did you first notice your symptoms?: more than 1 year ago  How often do your symptoms occur?: intermittently  Since you first noticed this problem, how has it changed?: unchanged  Do you have shortness of breath that occurs with effort or exertion?: Yes  Do you have ear congestion?: No  Do you have heartburn?: No  Do you have fatigue?: No  Do you have nasal congestion?: Yes  Do you have shortness of breath when lying flat?: No  Do you have shortness of breath when you wake up?: No  Do you have sweats?: No  Have you experienced weight loss?: No  Which of the following makes your symptoms worse?: change in weather, pollen  Which of the following makes your symptoms better?: OTC inhaler

## 2021-04-30 ENCOUNTER — OFFICE VISIT (OUTPATIENT)
Dept: PULMONOLOGY | Facility: CLINIC | Age: 76
End: 2021-04-30
Payer: MEDICARE

## 2021-04-30 VITALS
TEMPERATURE: 97.6 F | DIASTOLIC BLOOD PRESSURE: 60 MMHG | WEIGHT: 167 LBS | SYSTOLIC BLOOD PRESSURE: 118 MMHG | HEART RATE: 84 BPM | OXYGEN SATURATION: 97 % | RESPIRATION RATE: 18 BRPM | HEIGHT: 68 IN | BODY MASS INDEX: 25.31 KG/M2

## 2021-04-30 DIAGNOSIS — I42.8 NICM (NONISCHEMIC CARDIOMYOPATHY) (HCC): ICD-10-CM

## 2021-04-30 DIAGNOSIS — Z72.0 TOBACCO ABUSE: ICD-10-CM

## 2021-04-30 DIAGNOSIS — D61.818 PANCYTOPENIA (HCC): ICD-10-CM

## 2021-04-30 DIAGNOSIS — Z86.73 HISTORY OF STROKE: ICD-10-CM

## 2021-04-30 DIAGNOSIS — R47.01 APHASIA: ICD-10-CM

## 2021-04-30 DIAGNOSIS — J44.9 COPD, SEVERE (HCC): Primary | ICD-10-CM

## 2021-04-30 DIAGNOSIS — R91.1 PULMONARY NODULE: ICD-10-CM

## 2021-04-30 PROCEDURE — 99214 OFFICE O/P EST MOD 30 MIN: CPT | Performed by: INTERNAL MEDICINE

## 2021-05-03 NOTE — PROGRESS NOTES
Speech-Language Pathology Initial Evaluation    Today's date: 5/10/2021  Patients name: Bharti Rios  : 1945  MRN: [id number]  Safety measures: h/o CVA, HTN, COPD, aphasia,   Referring provider: Macey Tello DO    Primary diagnosis/billing code: R47 01  Secondary diagnosis/billing code: [id number]    Visit tracking:  -Referring provider: Epic  -Billing guidelines: CMS  -Visit #1/10  -Insurance: R [address] due 06/10/2021    Subjective comments: Patient arrived timely to appointment and actively participated in today's session  He was accompanied by his wife, Rehana Houser  How did the patient hear about us? Physician    Patient's goal(s): "to think"    Reason for referral: Decreased language skills  Prior functional status: Communication effective and appropriate in all situations  Clinically complex situations: N/A    History: Patient is a 78 y o  male who was referred to outpatient skilled Speech Therapy services for an aphasia evaluation  Patient was initially evaluated by neurology on 2019 as a result of c/o dysarthria and expressive aphasia  Per review of record, onset of symptoms began relatively suddenly (approximately 18 months prior to that appointment)  Per neurologist's report, "   I personally reviewed his noncontrast head CT with the neuroradiologist  There is some asymmetry on the left hand side in the temporal lobe as well as the occipital horn of the lateral ventricle with some concern for atypical changes  To further evaluate the cause for his difficulties with speech and the asymmetric appearance of his noncontrast head CT we will request an MRI of the brain to be performed   " MRI did not reveal an acute CVA, but a old/chronic one  Per patient report, "I can't think of what I'm trying to say " Patient also stated that he has trouble "pronouncing words" (e g , says "horn" when meaning to say "corn")   Patient's wife also reported inconsistent accuracy with yes/no questions (i e , responds "yes" when meaning to say no)  Patient demonstrated frustration with his current symptoms of aphasia  Has never f/u with speech therapy services  Patient completes crossword puzzles and cryptograms in the newspaper to stimulate his mind  Hearing: [facility] [employer] for testing  [facility] (wears glasses for reading and distance; h/o cataracts)    Home environment/lifestyle: Lives with wife Tracy Dutta)  Highest level of education: GED  Vocational status: Retired ([employer])    Mental status: Alert  Behavior status: Cooperative  Communication modalities: Verbal  Rehabilitation prognosis: Good rehab potential to reach the established goals    Assessments    The Western Aphasia Battery-Revised (WAB-R) is designed to evaluate a patient's language function following CVA, dementia, or other acquired neurological disorder  It measures a patients linguistic skills, such as speech content, fluency, auditory comprehension, repetition, naming, reading, and writing  The WAB-R also measures a patients nonlinguistic skills, including drawing, calculation, block design, and apraxia  The purpose of this standardized assessment is to (1) determine the presence, severity, and type of aphasia; (2) measure the patient's level of performance to provide a baseline for detecting change over time; (3) provide a comprehensive assessment of the patient's language assets and deficits in order to guide treatment and management; and (4) infer the location and etiology of the lesion causing aphasia  The following results were obtained during the administration of the assessment:     PART 1: Score:   -Spontaneous Speech:    -Auditory Verbal Comprehension: 9 6/10   -Repetition: 8 6/10   -Naming & Word Findin 9/10     *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        PART 2: Score:   -Reading Score:    -Writin   -Apraxia: 10/10   -Constructional, Visuospatial, & Calculation: 7 5/10        Score:   *Aphasia Quotient (AQ): 90 2/100   *Language Quotient (LQ): 93 9/100   *Cortical Quotient (CQ): 91 8/100     Goals    Short-term goals:  1  Patient will be educated on word finding strategies (i e , circumlocution) for improved generative naming and verbal expression skills (to be achieved in 1-2 weeks)  2  Patient will name an average of 15+ items in a category in 60 seconds over 5 trials using compensatory strategies to facilitate improved word retrieval skills (to be achieved in 4-6 weeks)  3  Patient will name an average of 10+ words that begin with a specific letter in 60 seconds over 5 trials using compensatory strategies to facilitate improved word retrieval skills (to be achieved in 4-6 weeks)  4  Patient will complete concrete and abstract categorization tasks to 80% accuracy to facilitate improved generative naming skills and working memory (to be achieved in 4-6 weeks)  5  Patient will complete word generation tasks (e g , analogies, category matrices, synonyms/antonyms, etc ) with 80% accuracy using word finding strategies to facilitate improved word retrieval skills (to be achieved in 4-6 weeks)  6  Patient will name up to 5 features to describe a person/place/thing with 80% accuracy to facilitate improved utilization of circumlocution strategy (to be achieved in 4-6 weeks)  Long-term goals:  1  Patient will improve ability to facilitate communication to meet needs including use of compensatory strategies to promote meaningful interactions for improved quality of life and maximize level of independence (to be achieved by discharge)  2  Patient will demonstrate adequate verbal expression during conversation without minimal breakdowns or word finding deficits (to be achieved by discharge)        Impressions/Recommendations    Impressions:   -Patient presents with moderate expressive aphasia s/p CVA c/b anomia with reduced verbal fluency, content of connected speech, slow rate of speech/processing, and phonemic paraphasias  Patient would benefit from outpatient skilled Speech Therapy services to support positive communication interactions with both familiar and unfamiliar listeners, communicate his basic wants/needs, follow directions within functional activities, participate in meaningful activities, allow for increased socialization, promote safety, and overall QOL      Recommendations:  -Patient would benefit from outpatient skilled Speech Therapy services: Speech/ language therapy    -Frequency: 2x weekly  -Duration: 4-6 weeks    -Intervention certification from: 8/81/7484  -Intervention certification to: 27/52/2656    -Intervention comments:   Initial evaluation/administration of standardized test with patient (60 minutes)  Scoring and interpretation of standardized test for the development of POC (60 minutes)

## 2021-05-04 ENCOUNTER — CONSULT (OUTPATIENT)
Dept: HEMATOLOGY ONCOLOGY | Facility: CLINIC | Age: 76
End: 2021-05-04
Payer: MEDICARE

## 2021-05-04 ENCOUNTER — APPOINTMENT (OUTPATIENT)
Dept: LAB | Facility: CLINIC | Age: 76
End: 2021-05-04
Payer: MEDICARE

## 2021-05-04 ENCOUNTER — TELEPHONE (OUTPATIENT)
Dept: HEMATOLOGY ONCOLOGY | Facility: CLINIC | Age: 76
End: 2021-05-04

## 2021-05-04 VITALS
BODY MASS INDEX: 25.39 KG/M2 | WEIGHT: 167.5 LBS | SYSTOLIC BLOOD PRESSURE: 120 MMHG | DIASTOLIC BLOOD PRESSURE: 68 MMHG | HEIGHT: 68 IN | OXYGEN SATURATION: 97 % | TEMPERATURE: 97.1 F | RESPIRATION RATE: 14 BRPM | HEART RATE: 63 BPM

## 2021-05-04 DIAGNOSIS — E53.8 DEFICIENCY OF OTHER SPECIFIED B GROUP VITAMINS: ICD-10-CM

## 2021-05-04 DIAGNOSIS — R79.89 OTHER SPECIFIED ABNORMAL FINDINGS OF BLOOD CHEMISTRY: ICD-10-CM

## 2021-05-04 DIAGNOSIS — D61.818 PANCYTOPENIA (HCC): Primary | ICD-10-CM

## 2021-05-04 DIAGNOSIS — D61.818 PANCYTOPENIA (HCC): ICD-10-CM

## 2021-05-04 LAB
FERRITIN SERPL-MCNC: 126 NG/ML (ref 8–388)
IRON SATN MFR SERPL: 39 %
IRON SERPL-MCNC: 115 UG/DL (ref 65–175)
RETICS # AUTO: NORMAL 10*3/UL (ref 14356–105094)
RETICS # CALC: 1.09 % (ref 0.37–1.87)
TIBC SERPL-MCNC: 295 UG/DL (ref 250–450)
VIT B12 SERPL-MCNC: 260 PG/ML (ref 100–900)

## 2021-05-04 PROCEDURE — 36415 COLL VENOUS BLD VENIPUNCTURE: CPT

## 2021-05-04 PROCEDURE — 84165 PROTEIN E-PHORESIS SERUM: CPT

## 2021-05-04 PROCEDURE — 85045 AUTOMATED RETICULOCYTE COUNT: CPT

## 2021-05-04 PROCEDURE — 82728 ASSAY OF FERRITIN: CPT

## 2021-05-04 PROCEDURE — 99204 OFFICE O/P NEW MOD 45 MIN: CPT | Performed by: INTERNAL MEDICINE

## 2021-05-04 PROCEDURE — 83010 ASSAY OF HAPTOGLOBIN QUANT: CPT

## 2021-05-04 PROCEDURE — 84165 PROTEIN E-PHORESIS SERUM: CPT | Performed by: PATHOLOGY

## 2021-05-04 PROCEDURE — 82607 VITAMIN B-12: CPT

## 2021-05-04 PROCEDURE — 83540 ASSAY OF IRON: CPT

## 2021-05-04 PROCEDURE — 83550 IRON BINDING TEST: CPT

## 2021-05-04 PROCEDURE — 88185 FLOWCYTOMETRY/TC ADD-ON: CPT

## 2021-05-04 PROCEDURE — 88184 FLOWCYTOMETRY/ TC 1 MARKER: CPT

## 2021-05-04 PROCEDURE — 87389 HIV-1 AG W/HIV-1&-2 AB AG IA: CPT

## 2021-05-04 NOTE — H&P (VIEW-ONLY)
Hematology/Oncology Outpatient Consult  Moriah Devi 78 y o  male MRN: @ Encounter: [id number]    Date:  5/4/2021      Assessment and Plan:    1  Pancytopenia ([address] )  This is a 73-year-old male with a past medical history of COPD, nonischemic cardiomyopathy, hypertension, history of stroke, CHF presenting for consultation regarding pancytopenia  Patient presents with wife Sánchze Damon  Most recent lab work 04/20/2021: WBC 4 07, Hgb 9 4g/dL, , platelet 94, ANC 7 15, other diff acceptable  Patient has had these blood work findings since approximately 4/2018  However, blood counts have worsened over the years  Patient has no constitutional symptoms as below, no frequent infections  No bleeding, rashes  He does drink 2 beers a day, used to drink much more than this in the past   He is unsure if he has liver disease  He does have some environmental risk factors as in the past he worked at a [employer]  Will obtain lab work as below to rule out other etiologies today  Will also obtain an ultrasound of the abdomen to check on liver and spleen  Will schedule for a bone marrow biopsy to rule out underlying possible MDS  Patient is on several medications that could cause low cell counts, it is doubtful this is the cause  Will additionally obtain fecal occult test as patient has never had a colonoscopy, would like to continue to hold off  Follow-up with Dr Clark Lewis after bone marrow biopsy  - Ambulatory referral to Interventional Radiology; Future  - Leukemia/Lymphoma flow cytometry; Future  - US abdomen complete; Future  - Protein electrophoresis, serum; Future  - Vitamin B12; Future  - CBC and differential; Future  - Iron Panel (Includes Ferritin, Iron Sat%, Iron, and TIBC); Future  - Reticulocytes; Future  - Human Immunodeficiency Virus 1/2 Antigen / Antibody ( Fourth Generation) with Reflex Testing;  Future  - Haptoglobin; Future  - Occult Blood, Fecal Immunochemical; Future    Patient and wife Gail Butts voiced understanding and agreement to the above  The patient knows to call the office with any questions or concerns regarding the above  I have spent 60 minutes with Patient and family today in which greater than 50% of this time was spent in counseling/coordination of care regarding Diagnostic results, Intructions for management, Patient and family education, Risk factor reductions and Impressions  HPI:   This is a 72-year-old male with a past medical history of COPD, nonischemic cardiomyopathy, hypertension, history of stroke, CHF presenting for consultation regarding pancytopenia  Patient presents with wife Gail Butts  1  Pancytopenia - since 4/2018    Patient has never seen a hematologist before  He denies any frequent infections, fever, chills, night sweats, lumps, bumps, sudden weight loss  Denies bleeding  He notes a poor diet, lacking fruits and vegetables  Denies any malabsorption issue  Denies personal history of cancer, liver disease, chronic kidney disease  Patient did smoke for 50+ years, recently quit in 2018  He drinks 2 cans of beer a day  Retired, worked at [employer], 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  He does have a family history of breast cancer in mother and sister  Lung cancer history in father and brother  He has never had a colonoscopy before  He does not take oral iron, does take once daily baby aspirin  Recent labs:  4/20/21: WBC 4 07, Hgb 9 4g/dL, , platelet 94, ANC 2 77    TSH WNL, BUN 35, Cr 1 63, AST, ALT, alk phos WNL  10/7/2019: WBC 5 53, Hgb 10 4g/dL, , platelet 898, ANC 7 88     2  History of Tobacco Abuse   - smoked for 50+ years as above, quit in 2018  ROS: Review of Systems   Constitutional: Negative for activity change, appetite change, chills, diaphoresis, fatigue, fever and unexpected weight change  Some chronic difficulty with speech due to CVA history     HENT: Negative for mouth sores and nosebleeds  Respiratory: Negative for apnea, cough, chest tightness and shortness of breath  Cardiovascular: Negative for chest pain, palpitations and leg swelling  Gastrointestinal: Negative for abdominal distention, abdominal pain, anal bleeding, blood in stool, constipation, diarrhea, nausea and vomiting  Endocrine: Negative for cold intolerance  Genitourinary: Negative for hematuria  Musculoskeletal: Negative for arthralgias  Skin: Negative for color change and pallor  Neurological: Negative for dizziness, weakness, light-headedness and headaches  Hematological: Negative for adenopathy  Does not bruise/bleed easily  Past Medical History:   Diagnosis Date    Cardiomyopathy [facility])     [facility]     COPD (chronic obstructive pulmonary disease) ([facility]     Dyspnea on exertion     Essential hypertension     Non-ischemic cardiomyopathy ([facility])        Past Surgical History:   Procedure Laterality Date    PILONIDAL CYST EXCISION         Social History     Socioeconomic History    Marital status: /Civil Union     Spouse name: None    Number of children: None    Years of education: None    Highest education level: None   Occupational History    None   Social Needs    Financial resource strain: None    Food insecurity     Worry: None     Inability: None    Transportation needs     Medical: None     Non-medical: None   Tobacco Use    Smoking status: Former Smoker     Packs/day: 1 00     Years: 60 00     Pack years: 60 00     Types: Cigarettes     Quit date: 2018     Years since quitting: 3 3    Smokeless tobacco: Never Used    Tobacco comment: Two cigarettes a day with lunch and dinner    Substance and Sexual Activity    Alcohol use:  Yes     Alcohol/week: 2 0 standard drinks     Types: 2 Cans of beer per week     Binge frequency: Less than monthly     Comment: occasion    Drug use: No    Sexual activity: None   Lifestyle    Physical activity Days per week: None     Minutes per session: None    Stress: None   Relationships    Social connections     Talks on phone: None     Gets together: None     Attends [employer] service: None     Active member of club or organization: None     Attends meetings of clubs or organizations: None     Relationship status: None    Intimate partner violence     Fear of current or ex partner: None     Emotionally abused: None     Physically abused: None     Forced sexual activity: None   Other Topics Concern    None   Social History Narrative    None       Family History   Problem Relation Age of Onset    Breast cancer Mother 55    Hypertension Father     Cancer Father     Cancer Family     Heart disease Maternal Uncle     Coronary artery disease Maternal Uncle     Heart failure Maternal Uncle     Breast cancer Sister 48    Heart attack Neg Hx     Stroke Neg Hx     Anuerysm Neg Hx     Arrhythmia Neg Hx     Clotting disorder Neg Hx     Hyperlipidemia Neg Hx        No Known Allergies      Current Outpatient Medications:     aspirin (ECOTRIN LOW STRENGTH) 81 mg EC tablet, Take 81 mg by mouth daily, Disp: , Rfl:     atorvastatin (LIPITOR) 20 mg tablet, TAKE 1 TABLET BY MOUTH IN  THE EVENING, Disp: 90 tablet, Rfl: 3    bisoprolol (ZEBETA) 5 mg tablet, TAKE ONE-HALF TABLET BY  MOUTH TWO TIMES DAILY, Disp: 90 tablet, Rfl: 3    furosemide (LASIX) 20 mg tablet, TAKE 2 TABLETS BY MOUTH  EVERY MORNING AND 1 TABLET  BY MOUTH IN THE AFTERNOON  FOR TOTAL OF 60MG PER DAY, Disp: 270 tablet, Rfl: 3    Incruse Ellipta 62 5 MCG/INH AEPB inhaler, USE 1 PUFF BY MOUTH DAILY, Disp: 90 each, Rfl: 3    sacubitril-valsartan (Entresto)  MG TABS, Take 1 tablet by mouth 2 (two) times a day, Disp: 180 tablet, Rfl: 3    Ventolin  (90 Base) MCG/ACT inhaler, USE 2 INHALATIONS BY MOUTH  EVERY 6 HOURS AS NEEDED FOR WHEEZING, Disp: 54 g, Rfl: 3    (Not in a hospital admission)        Physical Exam:  /68 (BP Location: Right arm, Patient Position: Sitting, Cuff Size: Adult)   Pulse 63   Temp (!) 97 1 °F (36 2 °C) (Temporal)   Resp 14   Ht 5' 8" (1 727 m)   Wt 76 kg (167 lb 8 oz)   SpO2 97%   BMI 25 47 kg/m²     Physical Exam  Constitutional:       General: He is not in acute distress  Appearance: Normal appearance  He is normal weight  He is not ill-appearing, toxic-appearing or diaphoretic  HENT:      Head: Normocephalic and atraumatic  Eyes:      General: No scleral icterus  Extraocular Movements: Extraocular movements intact  Conjunctiva/sclera: Conjunctivae normal    Neck:      Musculoskeletal: Normal range of motion and neck supple  Cardiovascular:      Rate and Rhythm: Normal rate and regular rhythm  Heart sounds: Normal heart sounds  No murmur  Pulmonary:      Effort: Pulmonary effort is normal  No respiratory distress  Breath sounds: Normal breath sounds  No stridor  No wheezing, rhonchi or rales  Abdominal:      Tenderness: There is no abdominal tenderness  Lymphadenopathy:      Cervical: No cervical adenopathy  Skin:     General: Skin is warm and dry  Neurological:      General: No focal deficit present  Mental Status: He is alert and oriented to person, place, and time  Mental status is at baseline  Psychiatric:         Mood and Affect: Mood normal          Behavior: Behavior normal          Thought Content:  Thought content normal          Judgment: Judgment normal        Labs:  Lab Results   Component Value Date    WBC 4 07 (L) 04/20/2021    HGB 9 4 (L) 04/20/2021    HCT 28 7 (L) 04/20/2021     (H) 04/20/2021    PLT 94 (L) 04/20/2021     Lab Results   Component Value Date     10/22/2015    K 4 9 04/20/2021     04/20/2021    CO2 27 04/20/2021    ANIONGAP 3 (L) 10/22/2015    BUN 35 (H) 04/20/2021    CREATININE 1 63 (H) 04/20/2021    GLUCOSE 100 10/22/2015    GLUF 98 04/20/2021    CALCIUM 8 6 04/20/2021    CORRECTEDCA 9 6 04/20/2021    AST 14 04/20/2021    ALT 16 04/20/2021    ALKPHOS 68 04/20/2021    PROT 7 7 10/22/2015    BILITOT 0 69 10/22/2015    EGFR 41 04/20/2021       Patient voiced understanding and agreement in the above discussion  Aware to contact our office with questions/symptoms in the interim

## 2021-05-04 NOTE — TELEPHONE ENCOUNTER
Biopsy to be preformed at [facility]  Patient only wanted this location  [facility] form filled out and sent to [facility]

## 2021-05-04 NOTE — PROGRESS NOTES
Hematology/Oncology Outpatient Consult  Jean Claude Salazar 78 y o  male MRN: @ Encounter: [id number]    Date:  5/4/2021      Assessment and Plan:    1  Pancytopenia ([address] )  This is a 68-year-old male with a past medical history of COPD, nonischemic cardiomyopathy, hypertension, history of stroke, CHF presenting for consultation regarding pancytopenia  Patient presents with wife Julia Szymanski  Most recent lab work 04/20/2021: WBC 4 07, Hgb 9 4g/dL, , platelet 94, ANC 0 78, other diff acceptable  Patient has had these blood work findings since approximately 4/2018  However, blood counts have worsened over the years  Patient has no constitutional symptoms as below, no frequent infections  No bleeding, rashes  He does drink 2 beers a day, used to drink much more than this in the past   He is unsure if he has liver disease  He does have some environmental risk factors as in the past he worked at a [employer], [employer]  Will obtain lab work as below to rule out other etiologies today  Will also obtain an ultrasound of the abdomen to check on liver and spleen  Will schedule for a bone marrow biopsy to rule out underlying possible MDS  Patient is on several medications that could cause low cell counts, it is doubtful this is the cause  Will additionally obtain fecal occult test as patient has never had a colonoscopy, would like to continue to hold off  Follow-up with Dr Papi Musa after bone marrow biopsy  - Ambulatory referral to Interventional Radiology; Future  - Leukemia/Lymphoma flow cytometry; Future  - US abdomen complete; Future  - Protein electrophoresis, serum; Future  - Vitamin B12; Future  - CBC and differential; Future  - Iron Panel (Includes Ferritin, Iron Sat%, Iron, and TIBC); Future  - Reticulocytes; Future  - Human Immunodeficiency Virus 1/2 Antigen / Antibody ( Fourth Generation) with Reflex Testing;  Future  - Haptoglobin; Future  - Occult Blood, Fecal Immunochemical; Future    Patient and wife Al Howell voiced understanding and agreement to the above  The patient knows to call the office with any questions or concerns regarding the above  I have spent 60 minutes with Patient and family today in which greater than 50% of this time was spent in counseling/coordination of care regarding Diagnostic results, Intructions for management, Patient and family education, Risk factor reductions and Impressions  HPI:   This is a 68-year-old male with a past medical history of COPD, nonischemic cardiomyopathy, hypertension, history of stroke, CHF presenting for consultation regarding pancytopenia  Patient presents with wife Al Howell  1  Pancytopenia - since 4/2018    Patient has never seen a hematologist before  He denies any frequent infections, fever, chills, night sweats, lumps, bumps, sudden weight loss  Denies bleeding  He notes a poor diet, lacking fruits and vegetables  Denies any malabsorption issue  Denies personal history of cancer, liver disease, chronic kidney disease  Patient did smoke for 50+ years, recently quit in 2018  He drinks 2 cans of beer a day  Retired, worked at [employer], 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  He does have a family history of breast cancer in mother and sister  Lung cancer history in father and brother  He has never had a colonoscopy before  He does not take oral iron, does take once daily baby aspirin  Recent labs:  4/20/21: WBC 4 07, Hgb 9 4g/dL, , platelet 94, ANC 6 71    TSH WNL, BUN 35, Cr 1 63, AST, ALT, alk phos WNL  10/7/2019: WBC 5 53, Hgb 10 4g/dL, , platelet 252, ANC 0 44     2  History of Tobacco Abuse   - smoked for 50+ years as above, quit in 2018  ROS: Review of Systems   Constitutional: Negative for activity change, appetite change, chills, diaphoresis, fatigue, fever and unexpected weight change  Some chronic difficulty with speech due to CVA history     HENT: Negative for mouth sores and nosebleeds  Respiratory: Negative for apnea, cough, chest tightness and shortness of breath  Cardiovascular: Negative for chest pain, palpitations and leg swelling  Gastrointestinal: Negative for abdominal distention, abdominal pain, anal bleeding, blood in stool, constipation, diarrhea, nausea and vomiting  Endocrine: Negative for cold intolerance  Genitourinary: Negative for hematuria  Musculoskeletal: Negative for arthralgias  Skin: Negative for color change and pallor  Neurological: Negative for dizziness, weakness, light-headedness and headaches  Hematological: Negative for adenopathy  Does not bruise/bleed easily  Past Medical History:   Diagnosis Date    Cardiomyopathy [facility])     [facility]cong[facility]) [facility]     COPD [facility]chronic obstructive pulmonary disease) ([facility])     Dyspnea on exertion     Essential hypertension     Non-ischemic cardiomyopathy ([facility])        Past Surgical History:   Procedure Laterality Date    PILONIDAL CYST EXCISION         Social History     Socioeconomic History    Marital status: /Civil Union     Spouse name: None    Number of children: None    Years of education: None    Highest education level: None   Occupational History    None   Social Needs    Financial resource strain: None    Food insecurity     Worry: None     Inability: None    Transportation needs     Medical: None     Non-medical: None   Tobacco Use    Smoking status: Former Smoker     Packs/day: 1 00     Years: 60 00     Pack years: 60 00     Types: Cigarettes     Quit date: 2018     Years since quitting: 3 3    Smokeless tobacco: Never Used    Tobacco comment: Two cigarettes a day with lunch and dinner    Substance and Sexual Activity    Alcohol use:  Yes     Alcohol/week: 2 0 standard drinks     Types: 2 Cans of beer per week     Binge frequency: Less than monthly     Comment: occasion    Drug use: No    Sexual activity: None   Lifestyle    Physical activity Days per week: None     Minutes per session: None    Stress: None   Relationships    Social connections     Talks on phone: None     Gets together: None     Attends [employer] service: None     Active member of club or organization: None     Attends meetings of clubs or organizations: None     Relationship status: None    Intimate partner violence     Fear of current or ex partner: None     Emotionally abused: None     Physically abused: None     Forced sexual activity: None   Other Topics Concern    None   Social History Narrative    None       Family History   Problem Relation Age of Onset    Breast cancer Mother 55    Hypertension Father     Cancer Father     Cancer Family     Heart disease Maternal Uncle     Coronary artery disease Maternal Uncle     Heart failure Maternal Uncle     Breast cancer Sister 48    Heart attack Neg Hx     Stroke Neg Hx     Anuerysm Neg Hx     Arrhythmia Neg Hx     Clotting disorder Neg Hx     Hyperlipidemia Neg Hx        No Known Allergies      Current Outpatient Medications:     aspirin (ECOTRIN LOW STRENGTH) 81 mg EC tablet, Take 81 mg by mouth daily, Disp: , Rfl:     atorvastatin (LIPITOR) 20 mg tablet, TAKE 1 TABLET BY MOUTH IN  THE EVENING, Disp: 90 tablet, Rfl: 3    bisoprolol (ZEBETA) 5 mg tablet, TAKE ONE-HALF TABLET BY  MOUTH TWO TIMES DAILY, Disp: 90 tablet, Rfl: 3    furosemide (LASIX) 20 mg tablet, TAKE 2 TABLETS BY MOUTH  EVERY MORNING AND 1 TABLET  BY MOUTH IN THE AFTERNOON  FOR TOTAL OF 60MG PER DAY, Disp: 270 tablet, Rfl: 3    Incruse Ellipta 62 5 MCG/INH AEPB inhaler, USE 1 PUFF BY MOUTH DAILY, Disp: 90 each, Rfl: 3    sacubitril-valsartan (Entresto)  MG TABS, Take 1 tablet by mouth 2 (two) times a day, Disp: 180 tablet, Rfl: 3    Ventolin  (90 Base) MCG/ACT inhaler, USE 2 INHALATIONS BY MOUTH  EVERY 6 HOURS AS NEEDED FOR WHEEZING, Disp: 54 g, Rfl: 3    (Not in a hospital admission)        Physical Exam:  /68 (BP Location: Right arm, Patient Position: Sitting, Cuff Size: Adult)   Pulse 63   Temp (!) 97 1 °F (36 2 °C) (Temporal)   Resp 14   Ht 5' 8" (1 727 m)   Wt 76 kg (167 lb 8 oz)   SpO2 97%   BMI 25 47 kg/m²     Physical Exam  Constitutional:       General: He is not in acute distress  Appearance: Normal appearance  He is normal weight  He is not ill-appearing, toxic-appearing or diaphoretic  HENT:      Head: Normocephalic and atraumatic  Eyes:      General: No scleral icterus  Extraocular Movements: Extraocular movements intact  Conjunctiva/sclera: Conjunctivae normal    Neck:      Musculoskeletal: Normal range of motion and neck supple  Cardiovascular:      Rate and Rhythm: Normal rate and regular rhythm  Heart sounds: Normal heart sounds  No murmur  Pulmonary:      Effort: Pulmonary effort is normal  No respiratory distress  Breath sounds: Normal breath sounds  No stridor  No wheezing, rhonchi or rales  Abdominal:      Tenderness: There is no abdominal tenderness  Lymphadenopathy:      Cervical: No cervical adenopathy  Skin:     General: Skin is warm and dry  Neurological:      General: No focal deficit present  Mental Status: He is alert and oriented to person, place, and time  Mental status is at baseline  Psychiatric:         Mood and Affect: Mood normal          Behavior: Behavior normal          Thought Content:  Thought content normal          Judgment: Judgment normal        Labs:  Lab Results   Component Value Date    WBC 4 07 (L) 04/20/2021    HGB 9 4 (L) 04/20/2021    HCT 28 7 (L) 04/20/2021     (H) 04/20/2021    PLT 94 (L) 04/20/2021     Lab Results   Component Value Date     10/22/2015    K 4 9 04/20/2021     04/20/2021    CO2 27 04/20/2021    ANIONGAP 3 (L) 10/22/2015    BUN 35 (H) 04/20/2021    CREATININE 1 63 (H) 04/20/2021    GLUCOSE 100 10/22/2015    GLUF 98 04/20/2021    CALCIUM 8 6 04/20/2021    CORRECTEDCA 9 6 04/20/2021    AST 14 04/20/2021    ALT 16 04/20/2021    ALKPHOS 68 04/20/2021    PROT 7 7 10/22/2015    BILITOT 0 69 10/22/2015    EGFR 41 04/20/2021       Patient voiced understanding and agreement in the above discussion  Aware to contact our office with questions/symptoms in the interim

## 2021-05-05 LAB
HAPTOGLOB SERPL-MCNC: 64 MG/DL (ref 34–355)
HIV 1+2 AB+HIV1 P24 AG SERPL QL IA: NORMAL

## 2021-05-06 LAB
ALBUMIN SERPL ELPH-MCNC: 4.54 G/DL (ref 3.5–5)
ALBUMIN SERPL ELPH-MCNC: 59 % (ref 52–65)
ALPHA1 GLOB SERPL ELPH-MCNC: 0.32 G/DL (ref 0.1–0.4)
ALPHA1 GLOB SERPL ELPH-MCNC: 4.1 % (ref 2.5–5)
ALPHA2 GLOB SERPL ELPH-MCNC: 0.8 G/DL (ref 0.4–1.2)
ALPHA2 GLOB SERPL ELPH-MCNC: 10.4 % (ref 7–13)
BETA GLOB ABNORMAL SERPL ELPH-MCNC: 0.43 G/DL (ref 0.4–0.8)
BETA1 GLOB SERPL ELPH-MCNC: 5.6 % (ref 5–13)
BETA2 GLOB SERPL ELPH-MCNC: 6.4 % (ref 2–8)
BETA2+GAMMA GLOB SERPL ELPH-MCNC: 0.49 G/DL (ref 0.2–0.5)
GAMMA GLOB ABNORMAL SERPL ELPH-MCNC: 1.12 G/DL (ref 0.5–1.6)
GAMMA GLOB SERPL ELPH-MCNC: 14.5 % (ref 12–22)
IGG/ALB SER: 1.44 {RATIO} (ref 1.1–1.8)
PROT PATTERN SERPL ELPH-IMP: NORMAL
PROT SERPL-MCNC: 7.7 G/DL (ref 6.4–8.2)

## 2021-05-07 ENCOUNTER — APPOINTMENT (OUTPATIENT)
Dept: LAB | Facility: CLINIC | Age: 76
End: 2021-05-07
Payer: MEDICARE

## 2021-05-07 DIAGNOSIS — D61.818 PANCYTOPENIA (HCC): ICD-10-CM

## 2021-05-07 LAB
HEMOCCULT STL QL IA: NEGATIVE
SCAN RESULT: NORMAL

## 2021-05-07 PROCEDURE — G0328 FECAL BLOOD SCRN IMMUNOASSAY: HCPCS

## 2021-05-10 ENCOUNTER — EVALUATION (OUTPATIENT)
Dept: SPEECH THERAPY | Facility: CLINIC | Age: 76
End: 2021-05-10
Payer: MEDICARE

## 2021-05-10 DIAGNOSIS — Z86.73 HISTORY OF STROKE: ICD-10-CM

## 2021-05-10 DIAGNOSIS — R47.01 APHASIA: Primary | ICD-10-CM

## 2021-05-10 PROCEDURE — 96105 ASSESSMENT OF APHASIA: CPT | Performed by: SPEECH-LANGUAGE PATHOLOGIST

## 2021-05-13 NOTE — PROGRESS NOTES
Daily Speech Treatment Note    Today's date: 2021  Patients name: Alta Higuera  : 1945  MRN: [id number]  Safety measures: h/o CVA, HTN, COPD, aphasia  Referring provider: Keon Hendricks DO    Primary diagnosis/billing code: R47 [id number]  Secondary diagnosis/billing code: [id number]     Visit tracking:  -Referring provider: Epic  -Billing guidelines: CMS  -Visit #2/10  -Insurance: R [place] [address] due 06/10/2021    Subjective/Behavioral:  -Patient was alert and pleasant during todays session  Objective/Assessment:    Short-term goals:  1  Patient will be educated on word finding strategies (i e , circumlocution) for improved generative naming and verbal expression skills (to be achieved in 1-2 weeks)  -Clinician presented patient with education on word finding strategies, including pause/delay, circumlocution, function/use, location, association, spelling, gesturing, drawing  Supplemental handout was provided to patient to facilitate increased carryover of strategies      2  Patient will name an average of 15+ items in a category in 60 seconds over 5 trials using compensatory strategies to facilitate improved word retrieval skills (to be achieved in 4-6 weeks)      3  Patient will name an average of 10+ words that begin with a specific letter in 60 seconds over 5 trials using compensatory strategies to facilitate improved word retrieval skills (to be achieved in 4-6 weeks)      4  Patient will complete concrete and abstract categorization tasks to 80% accuracy to facilitate improved generative naming skills and working memory (to be achieved in 4-6 weeks)  -Shingleton categorization: Patient was provided with 3 words belonging to the same category  Patient named category label in  opp (80% acc) independently, increasing to  opp (100% acc) with min semantic cues   Patient named an additional category member in  opp (92% acc) independently, increasing to  opp (100% acc) with min semantic cues  -Abstract categorization: Patient was provided with 3 words belonging to the same category  Patient named category label in 4/8 opp (50% acc) independently, increasing to 8/8 opp (100% acc) with min semantic cues  Patient named an additional category member in 7/8 opp (88% acc) independently, increasing to 8/8 opp (100% acc) with min semantic cues      5  Patient will complete word generation tasks (e g , analogies, category matrices, synonyms/antonyms, etc ) with 80% accuracy using word finding strategies to facilitate improved word retrieval skills (to be achieved in 4-6 weeks)  -Generative naming: Patient was presented with a written clue and asked to solve provided an initial letter (e g , Name a month that begins with a JJuly or June)  Task completed in 22/24 opp (92% acc) independently, increasing to 24/24 opp (100% acc) with min semantic cues      6  Patient will name up to 5 features to describe a person/place/thing with 80% accuracy to facilitate improved utilization of circumlocution strategy (to be achieved in 4-6 weeks)  Plan:  -Patient was provided with home exercises/activities to target goals in plan of care at the end of today's session   -Continue with current plan of care

## 2021-05-14 ENCOUNTER — HOSPITAL ENCOUNTER (OUTPATIENT)
Dept: ULTRASOUND IMAGING | Facility: HOSPITAL | Age: 76
Discharge: HOME | End: 2021-05-14
Payer: MEDICARE

## 2021-05-14 DIAGNOSIS — D61.818 PANCYTOPENIA (HCC): ICD-10-CM

## 2021-05-14 PROCEDURE — 76700 US EXAM ABDOM COMPLETE: CPT

## 2021-05-16 NOTE — PROGRESS NOTES
Daily Speech Treatment Note    Today's date: 2021 (***update)  Patients name: Alexandra Carl  : 1945  MRN: [id number]  Safety measures: h/o CVA, HTN, COPD, aphasia  Referring provider: Joslyn Olszewski, DO    Primary diagnosis/billing code: R47 01  Secondary diagnosis/billing code: Z86 73     Visit tracking:  -Referring provider: Epic  -Billing guidelines: CMS  -Visit #***/10  -Insurance: [place] [address] due 06/10/2021    Subjective/Behavioral:  -***    Objective/Assessment:  {ST note:69664}    Short-term goals:  1  Patient will be educated on word finding strategies (i e , circumlocution) for improved generative naming and verbal expression skills (to be achieved in 1-2 weeks)      2  Patient will name an average of 15+ items in a category in 60 seconds over 5 trials using compensatory strategies to facilitate improved word retrieval skills (to be achieved in 4-6 weeks)      3  Patient will name an average of 10+ words that begin with a specific letter in 60 seconds over 5 trials using compensatory strategies to facilitate improved word retrieval skills (to be achieved in 4-6 weeks)      4  Patient will complete concrete and abstract categorization tasks to 80% accuracy to facilitate improved generative naming skills and working memory (to be achieved in 4-6 weeks)      5  Patient will complete word generation tasks (e g , analogies, category matrices, synonyms/antonyms, etc ) with 80% accuracy using word finding strategies to facilitate improved word retrieval skills (to be achieved in 4-6 weeks)      6  Patient will name up to 5 features to describe a person/place/thing with 80% accuracy to facilitate improved utilization of circumlocution strategy (to be achieved in 4-6 weeks)  Plan:  -Patient was provided with home exercises/activities to target goals in plan of care at the end of today's session   -Continue with current plan of care

## 2021-05-19 ENCOUNTER — OFFICE VISIT (OUTPATIENT)
Dept: SPEECH THERAPY | Facility: CLINIC | Age: 76
End: 2021-05-19
Payer: MEDICARE

## 2021-05-19 DIAGNOSIS — R47.01 APHASIA: Primary | ICD-10-CM

## 2021-05-19 DIAGNOSIS — Z86.73 HISTORY OF STROKE: ICD-10-CM

## 2021-05-19 PROCEDURE — 92507 TX SP LANG VOICE COMM INDIV: CPT | Performed by: SPEECH-LANGUAGE PATHOLOGIST

## 2021-05-19 NOTE — PROGRESS NOTES
Daily Speech Treatment Note    Today's date: 2021   Patients name: Alma Rosa Edwards  : 1945  MRN: [id number]  Safety measures: h/o CVA, HTN, COPD, aphasia  Referring provider: Vicki Grove DO    Primary diagnosis/billing code: R47 01  Secondary diagnosis/billing code: Z86 73     Visit tracking:   -Referring provider: Epic  -Billing guidelines: CMS  -Visit #3/10  -Insurance: [address] [place] [address] due 06/10/2021    Subjective/Behavioral:  -Patient was in good spirits during today's session  Objective/Assessment:  -Reviewed patient's home exercises/activities completed since last appointment  [place] categorization (naming category completed in  opp & naming another category member completed in  opp)  Abstract categorization (naming category completed in  opp & naming another category member completed in  opp)  Patient achieved 100% acc with min-mod semantic cues from clinician  Short-term goals:  1  Patient will be educated on word finding strategies (i e , circumlocution) for improved generative naming and verbal expression skills (to be achieved in 1-2 weeks)      2  Patient will name an average of 15+ items in a category in 60 seconds over 5 trials using compensatory strategies to facilitate improved word retrieval skills (to be achieved in 4-6 weeks)      3  Patient will name an average of 10+ words that begin with a specific letter in 60 seconds over 5 trials using compensatory strategies to facilitate improved word retrieval skills (to be achieved in 4-6 weeks)      4  Patient will complete concrete and abstract categorization tasks to 80% accuracy to facilitate improved generative naming skills and working memory (to be achieved in 4-6 weeks)      5   Patient will complete word generation tasks (e g , analogies, category matrices, synonyms/antonyms, etc ) with 80% accuracy using word finding strategies to facilitate improved word retrieval skills (to be achieved in 4-6 weeks)      6  Patient will name up to 5 features to describe a person/place/thing with 80% accuracy to facilitate improved utilization of circumlocution strategy (to be achieved in 4-6 weeks)  -Word finding/semantic descriptors: The game [employer] was utilized to target word finding/circumlocution  Patient completed semantic descriptions across 18 trials with min verbal cues from clinician to elaborate on descriptions  Patient guessed clue words presented by clinician in 18/18 opp (100% acc)  Discussed strategies to assist with word finding abilities in daily life  Plan:  -Patient was provided with home exercises/activities to target goals in plan of care at the end of today's session   -Continue with current plan of care

## 2021-05-23 ENCOUNTER — HOSPITAL ENCOUNTER (OUTPATIENT)
Dept: CT IMAGING | Facility: HOSPITAL | Age: 76
Discharge: HOME | End: 2021-05-23
Attending: INTERNAL MEDICINE
Payer: MEDICARE

## 2021-05-23 DIAGNOSIS — R91.1 PULMONARY NODULE: ICD-10-CM

## 2021-05-23 PROCEDURE — 71250 CT THORAX DX C-: CPT

## 2021-05-23 NOTE — PROGRESS NOTES
Daily Speech Treatment Note    Today's date: 2021  Patients name: Carin Conroy  : 1945  MRN: [id number]  Safety measures: h/o CVA, HTN, COPD, aphasia  Referring provider: Emilia Salinas DO    Primary diagnosis/billing code: R47 01  Secondary diagnosis/billing code: Z86 73     Visit tracking:  -Referring provider: Epic  -Billing guidelines: CMS  -Visit #4/10  -Insurance: R [place] [address] due 06/10/2021    Subjective/Behavioral:  -Patient actively participated in all activities during today's session  Objective/Assessment:  -Reviewed patient's home exercises/activities completed since last appointment  Categorization grids completed in  opp  Synonym/antonym naming completed as well; however, it was noted that patient frequently wrote 2 synonyms instead--utilized therapy time to complete correctly  Short-term goals:  1  Patient will be educated on word finding strategies (i e , circumlocution) for improved generative naming and verbal expression skills (to be achieved in 1-2 weeks)      2  Patient will name an average of 15+ items in a category in 60 seconds over 5 trials using compensatory strategies to facilitate improved word retrieval skills (to be achieved in 4-6 weeks)      3  Patient will name an average of 10+ words that begin with a specific letter in 60 seconds over 5 trials using compensatory strategies to facilitate improved word retrieval skills (to be achieved in 4-6 weeks)      4  Patient will complete concrete and abstract categorization tasks to 80% accuracy to facilitate improved generative naming skills and working memory (to be achieved in 4-6 weeks)  -Generative naming: Patient instructed to name 3 words for a given category  Task completed in  opp (92% acc) independently, increasing to  opp (100% acc) with min semantic cues       5   Patient will complete word generation tasks (e g , analogies, category matrices, synonyms/antonyms, etc ) with 80% accuracy using word finding strategies to facilitate improved word retrieval skills (to be achieved in 4-6 weeks)  -Word finding: Patient presented with a list of words and asked to generate antonyms & synonyms  Antonyms task completed in 14/44 opp (32% acc) independently  Synonyms task completed in 41/44 opp (93% acc) independently  Patient achieved 100% acc with mod semantic and min phonemic cues  -Word deduction: Patient was provided with a definition aloud and asked to name what was being described  It should be noted that patient was provided with first letter to assist with word retrieval  Task completed in 54/60 opp (90% acc) independently, increasing to 58/60 opp (97% acc) with min semantic cues and additional processing time  6  Patient will name up to 5 features to describe a person/place/thing with 80% accuracy to facilitate improved utilization of circumlocution strategy (to be achieved in 4-6 weeks)  Plan:  -Patient was provided with home exercises/activities to target goals in plan of care at the end of today's session   -Continue with current plan of care

## 2021-05-24 ENCOUNTER — OFFICE VISIT (OUTPATIENT)
Dept: SPEECH THERAPY | Facility: CLINIC | Age: 76
End: 2021-05-24
Payer: MEDICARE

## 2021-05-24 DIAGNOSIS — R47.01 APHASIA: Primary | ICD-10-CM

## 2021-05-24 DIAGNOSIS — Z86.73 HISTORY OF STROKE: ICD-10-CM

## 2021-05-24 PROCEDURE — 92507 TX SP LANG VOICE COMM INDIV: CPT | Performed by: SPEECH-LANGUAGE PATHOLOGIST

## 2021-05-25 RX ORDER — SODIUM CHLORIDE 9 MG/ML
75 INJECTION, SOLUTION INTRAVENOUS CONTINUOUS
Status: CANCELLED | OUTPATIENT
Start: 2021-05-25

## 2021-05-26 ENCOUNTER — APPOINTMENT (OUTPATIENT)
Dept: SPEECH THERAPY | Facility: CLINIC | Age: 76
End: 2021-05-26
Payer: MEDICARE

## 2021-05-26 ENCOUNTER — HOSPITAL ENCOUNTER (OUTPATIENT)
Dept: CT IMAGING | Facility: HOSPITAL | Age: 76
Discharge: HOME | End: 2021-05-26
Payer: MEDICARE

## 2021-05-26 VITALS
WEIGHT: 167 LBS | HEART RATE: 56 BPM | OXYGEN SATURATION: 94 % | BODY MASS INDEX: 25.31 KG/M2 | HEIGHT: 68 IN | DIASTOLIC BLOOD PRESSURE: 52 MMHG | TEMPERATURE: 97.8 F | RESPIRATION RATE: 18 BRPM | SYSTOLIC BLOOD PRESSURE: 101 MMHG

## 2021-05-26 DIAGNOSIS — D61.818 PANCYTOPENIA (HCC): ICD-10-CM

## 2021-05-26 PROCEDURE — 88184 FLOWCYTOMETRY/ TC 1 MARKER: CPT | Performed by: INTERNAL MEDICINE

## 2021-05-26 PROCEDURE — 88185 FLOWCYTOMETRY/TC ADD-ON: CPT

## 2021-05-26 PROCEDURE — 77012 CT SCAN FOR NEEDLE BIOPSY: CPT | Performed by: RADIOLOGY

## 2021-05-26 PROCEDURE — 99152 MOD SED SAME PHYS/QHP 5/>YRS: CPT | Performed by: RADIOLOGY

## 2021-05-26 PROCEDURE — 88341 IMHCHEM/IMCYTCHM EA ADD ANTB: CPT | Performed by: PATHOLOGY

## 2021-05-26 PROCEDURE — 38222 DX BONE MARROW BX & ASPIR: CPT

## 2021-05-26 PROCEDURE — 38222 DX BONE MARROW BX & ASPIR: CPT | Performed by: RADIOLOGY

## 2021-05-26 PROCEDURE — 88311 DECALCIFY TISSUE: CPT | Performed by: PATHOLOGY

## 2021-05-26 PROCEDURE — 88313 SPECIAL STAINS GROUP 2: CPT | Performed by: PATHOLOGY

## 2021-05-26 PROCEDURE — 99152 MOD SED SAME PHYS/QHP 5/>YRS: CPT

## 2021-05-26 PROCEDURE — 85097 BONE MARROW INTERPRETATION: CPT | Performed by: PATHOLOGY

## 2021-05-26 PROCEDURE — 77012 CT SCAN FOR NEEDLE BIOPSY: CPT

## 2021-05-26 PROCEDURE — 88305 TISSUE EXAM BY PATHOLOGIST: CPT | Performed by: PATHOLOGY

## 2021-05-26 PROCEDURE — 88342 IMHCHEM/IMCYTCHM 1ST ANTB: CPT | Performed by: PATHOLOGY

## 2021-05-26 RX ORDER — FENTANYL CITRATE 50 UG/ML
INJECTION, SOLUTION INTRAMUSCULAR; INTRAVENOUS CODE/TRAUMA/SEDATION MEDICATION
Status: COMPLETED | OUTPATIENT
Start: 2021-05-26 | End: 2021-05-26

## 2021-05-26 RX ORDER — MIDAZOLAM HYDROCHLORIDE 2 MG/2ML
INJECTION, SOLUTION INTRAMUSCULAR; INTRAVENOUS CODE/TRAUMA/SEDATION MEDICATION
Status: COMPLETED | OUTPATIENT
Start: 2021-05-26 | End: 2021-05-26

## 2021-05-26 RX ADMIN — FENTANYL CITRATE 50 MCG: 50 INJECTION, SOLUTION INTRAMUSCULAR; INTRAVENOUS at 09:04

## 2021-05-26 RX ADMIN — MIDAZOLAM HYDROCHLORIDE 1 MG: 1 INJECTION, SOLUTION INTRAMUSCULAR; INTRAVENOUS at 09:04

## 2021-05-26 RX ADMIN — FENTANYL CITRATE 50 MCG: 50 INJECTION, SOLUTION INTRAMUSCULAR; INTRAVENOUS at 09:11

## 2021-05-26 RX ADMIN — MIDAZOLAM HYDROCHLORIDE 1 MG: 1 INJECTION, SOLUTION INTRAMUSCULAR; INTRAVENOUS at 09:12

## 2021-05-26 NOTE — INTERVAL H&P NOTE
Update: (This section must be completed if the H&P was completed greater than 24 hrs to procedure or admission)    H&P reviewed  After examining the patient, I find no changed to the H&P since it had been written  Patient re-evaluated   Accept as history and physical     Jj Vallecillo MD/May 26, 2021/9:22 AM

## 2021-05-26 NOTE — DISCHARGE INSTRUCTIONS
Bone Marrow Biopsy     WHAT YOU NEED TO KNOW:   A bone marrow biopsy is a procedure to remove a small amount of bone marrow from your bone  Bone marrow is the soft tissue inside your bone that helps to make blood cells  The sample is tested for disease or infection  DISCHARGE INSTRUCTIONS:     1  Limit your activities day of biopsy as directed by your doctor  2  Use medication as ordered  3  Return to your normal diet  Small sips of flat soda will help with nausea  4  Remove band-aid or dressing 24 hours after procedure  Contact Interventional Radiology at [phone] [place] PATIENTS: Contact Interventional Radiology at [phone]) Monica Berger PATIENTS: Contact Interventional Radiology at [phone]) if:    1  Difficulty breathing, nausea or vomiting  2  Chills or fever above 101 F     3  Pain at biopsy site not relieved by medication  4  Develop any redness, swelling, heat, unusual drainage, heavy bruising or bleeding from biopsy site

## 2021-05-26 NOTE — BRIEF OP NOTE (RAD/CATH)
INTERVENTIONAL RADIOLOGY PROCEDURE NOTE    Date: 5/26/2021    Procedure: IR BIOPSY BONE MARROW    Preoperative diagnosis:   1  Pancytopenia (HCC)         Postoperative diagnosis: Same  Surgeon: Carrie Umaña MD     Assistant: None  No qualified resident was available  Blood loss: Minimal    Specimens: 6 cc bone marrow, 1 bone core     Findings: BM biopsy    Complications: None immediate      Anesthesia: conscious sedation

## 2021-05-27 DIAGNOSIS — I42.8 NICM (NONISCHEMIC CARDIOMYOPATHY) (HCC): ICD-10-CM

## 2021-05-28 LAB — SCAN RESULT: NORMAL

## 2021-05-29 NOTE — PROGRESS NOTES
Daily Speech Treatment Note    Today's date: 2021  Patients name: Alexandra Carl  : 1945  MRN: [id number]  Safety measures: h/o CVA, HTN, COPD, aphasia  Referring provider: Joslyn Olszewski, DO    Primary diagnosis/billing code: R47 01  Secondary diagnosis/billing code: Z86 73     Visit tracking:  -Referring provider: Epic  -Billing guidelines: CMS  -Visit #6/10  -Insurance: R [address] [place] [address] due 06/10/2021    Subjective/Behavioral:  -Patient engaged well in all activities during session today  Objective/Assessment:  -Reviewed patient's home exercises/activities completed since last appointment  Synonym naming completed in  opp with 5 self-reported use of dictionary  Antonym naming completed  opp (it should be noted that patient supplied many appropriate synonyms; however, activity was to target antonym naming) Utilized therapy time to complete antonym naming provided mod semantic and phonemic cues  Short-term goals:  1  Patient will be educated on word finding strategies (i e , circumlocution) for improved generative naming and verbal expression skills (to be achieved in 1-2 weeks)      2  Patient will name an average of 15+ items in a category in 60 seconds over 5 trials using compensatory strategies to facilitate improved word retrieval skills (to be achieved in 4-6 weeks)  -Verbal fluency/thought organization: Patient was presented with a category label and asked to verbally generate as many category members as he could in 60 seconds  Patient named an average of 9 67 members/min (e g , animals, things that are round)  (norm = 15+/min) across 6 trials  Presented patient with education on subcategory naming to facilitate increased organization within task  3  Patient will name an average of 10+ words that begin with a specific letter in 60 seconds over 5 trials using compensatory stratgeies to facilitate improved word retrieval skills (to be achieved in 4-6 weeks)    -Verbal fluency/thought organization: Patient was presented with a single letter of the alphabet and asked to generate words beginning with that letter provided a time constraint of 60 seconds  Patient named 6 words/min (goal 10+) across 6 trials  Presented patient with education on category member naming, as well as utilization of vowels, to facilitate increased organization within task      4  Patient will complete concrete and abstract categorization tasks to 80% accuracy to facilitate improved generative naming skills and working memory (to be achieved in 4-6 weeks)      5  Patient will complete word generation tasks (e g , analogies, category matrices, synonyms/antonyms, etc ) with 80% accuracy using word finding strategies to facilitate improved word retrieval skills (to be achieved in 4-6 weeks)  -See HEP review      6  Patient will name up to 5 features to describe a person/place/thing with 80% accuracy to facilitate improved utilization of circumlocution strategy (to be achieved in 4-6 weeks)  Plan:  -Patient was provided with home exercises/activities to target goals in plan of care at the end of today's session   -Continue with current plan of care

## 2021-05-29 NOTE — PROGRESS NOTES
Daily Speech Treatment Note    Today's date: 6/3/2021   Patients name: Alta Higeura  : 1945  MRN: [id number]  Safety measures: h/o CVA, HTN, COPD, aphasia  Referring provider: Keon Hendricks DO    Primary diagnosis/billing code: R47 01  Secondary diagnosis/billing code: Z86 73     Visit tracking:  -Referring provider: Epic  -Billing guidelines: CMS  -Visit #5/10  -Insurance: R [place] [address] due 06/10/2021    Subjective/Behavioral:  -Patient was alert and pleasant during todays session  Objective/Assessment:  -Reviewed patient's home exercises/activities completed since last appointment  Naming [place] Members (3 items) completed  opp  Short-term goals:  1  Patient will be educated on word finding strategies (i e , circumlocution) for improved generative naming and verbal expression skills (to be achieved in 1-2 weeks)      2  Patient will name an average of 15+ items in a category in 60 seconds over 5 trials using compensatory strategies to facilitate improved word retrieval skills (to be achieved in 4-6 weeks)      3  Patient will name an average of 10+ words that begin with a specific letter in 60 seconds over 5 trials using compensatory strategies to facilitate improved word retrieval skills (to be achieved in 4-6 weeks)      4  Patient will complete concrete and abstract categorization tasks to 80% accuracy to facilitate improved generative naming skills and working memory (to be achieved in 4-6 weeks)      5  Patient will complete word generation tasks (e g , analogies, category matrices, synonyms/antonyms, etc ) with 80% accuracy using word finding strategies to facilitate improved word retrieval skills (to be achieved in 4-6 weeks)  -Word deduction: Patient was provided with a definition aloud and asked to name what was being described   It should be noted that patient was provided with first letter to assist with word retrieval  Task completed in  opp (87% acc) independently, increasing to 59/60 opp (98% acc) with min semantic cues and additional processing time       -Word finding: Patient presented with a list of words and asked to generate 2 synonyms for each word  Task completed in 32/44 opp (73% acc) independently, increasing to 42/44 opp (95% acc) with mod semantic cues and additional processing time  6  Patient will name up to 5 features to describe a person/place/thing with 80% accuracy to facilitate improved utilization of circumlocution strategy (to be achieved in 4-6 weeks)  Plan:  -Patient was provided with home exercises/activities to target goals in plan of care at the end of today's session   -Continue with current plan of care

## 2021-06-01 ENCOUNTER — OFFICE VISIT (OUTPATIENT)
Dept: SPEECH THERAPY | Facility: CLINIC | Age: 76
End: 2021-06-01
Payer: MEDICARE

## 2021-06-01 DIAGNOSIS — R47.01 APHASIA: Primary | ICD-10-CM

## 2021-06-01 DIAGNOSIS — Z86.73 HISTORY OF STROKE: ICD-10-CM

## 2021-06-01 PROCEDURE — 92507 TX SP LANG VOICE COMM INDIV: CPT | Performed by: SPEECH-LANGUAGE PATHOLOGIST

## 2021-06-01 NOTE — PROGRESS NOTES
Speech-Language Pathology Re-Evaluation    Today's date: 6/10/2021  Patients name: Delfin Cates  : 1945  MRN: [id number]  Safety measures: h/o CVA, HTN, COPD, aphasia,   Referring provider: Andres Bazan DO    Primary diagnosis/billing code: R47 01  Secondary diagnosis/billing code: Z86 73    Visit tracking:  -Referring provider: Epic  -Billing guidelines: CMS  -Visit #1/10 (total visits: 7)  -Insurance: R [place] 118 due 07/10/2021    Subjective comments: Patient engaged well in all activities during session today  Patient's goal(s): "to think"    Assessments    The [employer] Edition (BNT-2) is a confrontational naming assessment that asks patients to provide the best name for a given picture  It was designed to detect word-finding impairments  The BNT-2 consists of 60 pictures, ordered from easiest to most difficult  Stimulus cues, including semantic, phonemic, and written information, are provided as necessary  The following results were obtained during the administration of the assessment:     Summary of Scores: Score:   1  Number of spontaneously given correct response: 51       2  Number of stimulus cues given:  9   3  Number of correct responses following a stimulus cue:  3       *TOTAL SCORE: 54/60   Percentile Rank:  85%ile       Additional Cuein  Number of phonemic cues: 6   5  Number of correct responses following the phonemic cue: 5       6  Number of multiple choices given:  1   7  Number of correct choices: 0       The picture description task from the [facility] Diagnostic Aphasia Examination-Third Edition (BDAE-3) was also presented to patient during today's reassessment  Patient was noted to speak in full sentences; however, rate of speech was judged to be slow/effortful with hesitations  Paraphasias were not present; however, phonemic paraphasias were noted during confrontational naming task  Goals    Short-term goals:  1   Patient will be educated on word finding strategies (i e , circumlocution) for improved generative naming and verbal expression skills (to be achieved in 1-2 weeks)  -- MET    2  Patient will name an average of 15+ items in a category in 60 seconds over 5 trials using compensatory strategies to facilitate improved word retrieval skills (to be achieved in 4-6 weeks)  -- PARTIALLY MET    3  Patient will name an average of 10+ words that begin with a specific letter in 60 seconds over 5 trials using compensatory strategies to facilitate improved word retrieval skills (to be achieved in 4-6 weeks)  -- PARTIALLY MET    4  Patient will complete concrete and abstract categorization tasks to 80% accuracy to facilitate improved generative naming skills and working memory (to be achieved in 4-6 weeks)  -- PARTIALLY MET    5  Patient will complete word generation tasks (e g , analogies, category matrices, synonyms/antonyms, etc ) with 80% accuracy using word finding strategies to facilitate improved word retrieval skills (to be achieved in 4-6 weeks)  -- PARTIALLY MET    6  Patient will name up to 5 features to describe a person/place/thing with 80% accuracy to facilitate improved utilization of circumlocution strategy (to be achieved in 4-6 weeks)  -- NOT ADDRESSED     Long-term goals:  1  Patient will improve ability to facilitate communication to meet needs including use of compensatory strategies to promote meaningful interactions for improved quality of life and maximize level of independence (to be achieved by discharge)  -- PARTIALLY MET    2  Patient will demonstrate adequate verbal expression during conversation without minimal breakdowns or word finding deficits (to be achieved by discharge)   -- PARTIALLY MET    Impressions/Recommendations    Impressions:   Patient continues to present with moderate expressive aphasia s/p CVA c/b anomia with reduced verbal fluency, content of connected speech, slow rate of speech/processing, and phonemic paraphasias  Per patient report, he inconsistently hesitates with conversations and it can vary with listeners  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 in his word finding abilities  Patient, however, reported continued difficulty with verbal expression with wife ("I get mixed up with words  ") During testing today, patient stated, "It's on the tip of my tongue " Patient has been consistent with attendance in [address] [place] [address] and is motivated during all sessions  Conversation about familiar subjects is possible with help from the listener  Patient would benefit from continued outpatient skilled Speech Therapy services to support positive communication interactions with both familiar and unfamiliar listeners, communicate his basic wants/needs, follow directions within functional activities, participate in meaningful activities, allow for increased socialization, promote safety, and overall QOL      Recommendations:  -Patient would benefit from outpatient skilled Speech Therapy services: Speech/ language therapy    -Frequency: 2x weekly  -Duration: 4-6 weeks    -Intervention certification from: 4/92/1144  -Intervention certification to: 02/31/8943

## 2021-06-02 ENCOUNTER — APPOINTMENT (OUTPATIENT)
Dept: LAB | Facility: CLINIC | Age: 76
End: 2021-06-02
Payer: MEDICARE

## 2021-06-02 ENCOUNTER — TRANSCRIBE ORDERS (OUTPATIENT)
Dept: LAB | Facility: CLINIC | Age: 76
End: 2021-06-02

## 2021-06-02 DIAGNOSIS — D61.818 PANCYTOPENIA (HCC): ICD-10-CM

## 2021-06-02 LAB
BASOPHILS # BLD AUTO: 0.01 THOUSANDS/ΜL (ref 0–0.1)
BASOPHILS NFR BLD AUTO: 0 % (ref 0–1)
EOSINOPHIL # BLD AUTO: 0.38 THOUSAND/ΜL (ref 0–0.61)
EOSINOPHIL NFR BLD AUTO: 11 % (ref 0–6)
ERYTHROCYTE [DISTWIDTH] IN BLOOD BY AUTOMATED COUNT: 14 % (ref 11.6–15.1)
HCT VFR BLD AUTO: 27.8 % (ref 36.5–49.3)
HGB BLD-MCNC: 9 G/DL (ref 12–17)
IMM GRANULOCYTES # BLD AUTO: 0.01 THOUSAND/UL (ref 0–0.2)
IMM GRANULOCYTES NFR BLD AUTO: 0 % (ref 0–2)
LYMPHOCYTES # BLD AUTO: 1.31 THOUSANDS/ΜL (ref 0.6–4.47)
LYMPHOCYTES NFR BLD AUTO: 37 % (ref 14–44)
MCH RBC QN AUTO: 35.9 PG (ref 26.8–34.3)
MCHC RBC AUTO-ENTMCNC: 32.4 G/DL (ref 31.4–37.4)
MCV RBC AUTO: 111 FL (ref 82–98)
MONOCYTES # BLD AUTO: 0.4 THOUSAND/ΜL (ref 0.17–1.22)
MONOCYTES NFR BLD AUTO: 11 % (ref 4–12)
NEUTROPHILS # BLD AUTO: 1.43 THOUSANDS/ΜL (ref 1.85–7.62)
NEUTS SEG NFR BLD AUTO: 41 % (ref 43–75)
NRBC BLD AUTO-RTO: 0 /100 WBCS
PLATELET # BLD AUTO: 84 THOUSANDS/UL (ref 149–390)
PMV BLD AUTO: 11.3 FL (ref 8.9–12.7)
RBC # BLD AUTO: 2.51 MILLION/UL (ref 3.88–5.62)
WBC # BLD AUTO: 3.54 THOUSAND/UL (ref 4.31–10.16)

## 2021-06-02 PROCEDURE — 36415 COLL VENOUS BLD VENIPUNCTURE: CPT

## 2021-06-02 PROCEDURE — 85025 COMPLETE CBC W/AUTO DIFF WBC: CPT

## 2021-06-03 ENCOUNTER — OFFICE VISIT (OUTPATIENT)
Dept: SPEECH THERAPY | Facility: CLINIC | Age: 76
End: 2021-06-03
Payer: MEDICARE

## 2021-06-03 DIAGNOSIS — Z86.73 HISTORY OF STROKE: ICD-10-CM

## 2021-06-03 DIAGNOSIS — R47.01 APHASIA: Primary | ICD-10-CM

## 2021-06-03 PROCEDURE — 92507 TX SP LANG VOICE COMM INDIV: CPT | Performed by: SPEECH-LANGUAGE PATHOLOGIST

## 2021-06-07 ENCOUNTER — OFFICE VISIT (OUTPATIENT)
Dept: SPEECH THERAPY | Facility: CLINIC | Age: 76
End: 2021-06-07
Payer: MEDICARE

## 2021-06-07 DIAGNOSIS — R47.01 APHASIA: Primary | ICD-10-CM

## 2021-06-07 DIAGNOSIS — Z86.73 HISTORY OF STROKE: ICD-10-CM

## 2021-06-07 PROCEDURE — 92507 TX SP LANG VOICE COMM INDIV: CPT | Performed by: SPEECH-LANGUAGE PATHOLOGIST

## 2021-06-09 ENCOUNTER — OFFICE VISIT (OUTPATIENT)
Dept: HEMATOLOGY ONCOLOGY | Facility: CLINIC | Age: 76
End: 2021-06-09
Payer: MEDICARE

## 2021-06-09 VITALS
WEIGHT: 165 LBS | HEART RATE: 64 BPM | HEIGHT: 68 IN | TEMPERATURE: 97.9 F | OXYGEN SATURATION: 97 % | RESPIRATION RATE: 16 BRPM | DIASTOLIC BLOOD PRESSURE: 66 MMHG | SYSTOLIC BLOOD PRESSURE: 128 MMHG | BODY MASS INDEX: 25.01 KG/M2

## 2021-06-09 DIAGNOSIS — E53.8 B12 DEFICIENCY: ICD-10-CM

## 2021-06-09 DIAGNOSIS — D61.818 PANCYTOPENIA (HCC): ICD-10-CM

## 2021-06-09 DIAGNOSIS — D61.9 APLASTIC ANEMIA (HCC): Primary | ICD-10-CM

## 2021-06-09 DIAGNOSIS — Z78.9 UNHEALTHY ALCOHOL DRINKING BEHAVIOR: ICD-10-CM

## 2021-06-09 PROCEDURE — 99215 OFFICE O/P EST HI 40 MIN: CPT | Performed by: INTERNAL MEDICINE

## 2021-06-09 NOTE — PROGRESS NOTES
HEMATOLOGY / ONCOLOGY CLINIC NOTE    Primary Care Provider: Rajesh Coronel DO  Referring Provider:    MRN: [id number]  : 1945    Reason for Encounter:  Chief Complaint   Patient presents with    Follow-up     Pancytopenia         Hematology / Oncology History:     Clari Castillo is a 78 y o  male who came in for follow up        1, aplastic anemia  - bone marrow biopsy in 2021 showed stem cell 5%  - mild, ANC 1 4 K    2,   pancytopenia going on for to 3 years  3, drink beers daily, 2 beers a day    Assessment / Plan:     1  Aplastic anemia (HCC)  - made patient aware diagnosis, prognosis, treatment options  Given this is mild form of aplastic anemia, no frequent infection, no bleeding, ANC is 1 4 K, has been stable for the past 2 years, no aggressive treatment needed will continue to follow patient  Plan to repeat labs in 3 months and follow patient after  If ANC less than 500 will consider start treatment  May consider ATG or chemotherapy Cytoxan, patient need to be evaluated by bone marrow  at that time  2  Pancytopenia ([address] )  - as above    3  B12 deficiency  - borderline, patient will take over-the-counter multivitamin  4  Unhealthy alcohol drinking behavior  - made patient aware alcohol drinking is toxic to bone marrow, ideally patient should stop drinking  At least he should significantly cut down beer drinking to 2 beers a  week                          minutes were spent face to face with patient with greater than 50% of the time spent in counseling or coordination of care including discussions of treatment instructions  All of the patient's questions were answered to their satisfactory during this discussion  Advised pt to call if there is any further questions  Interval History:     2021:  Patient came for follow-up, subjectively doing okay no infection no bleeding  Body weight stable no constitutional symptoms    No autoimmune disease  Problem list:     Patient Active Problem List   Diagnosis    [facility]mic [facility]    Essential hypertension    Dyspnea on exertion    COPD, severe (HCC)    Abnormal CXR    Tobacco abuse in remission    Aphasia    Pulmonary nodule    History of stroke    Slurred speech    Pancytopenia ([facility] )    Unhealthy alcohol drinking behavior    B12 deficiency       PHYSICIAL EXAMINATION:       Vital Signs:   /66   Pulse 64   Temp 97 9 °F (36 6 °C) (Temporal)   Resp 16   Ht 5' 8" (1 727 m)   Wt 74 8 kg (165 lb)   SpO2 97%   BMI 25 09 kg/m²   Body surface area is 1 88 meters squared  Ht Readings from Last 3 Encounters:   06/09/21 5' 8" (1 727 m)   05/26/21 5' 8" (1 727 m)   05/04/21 5' 8" (1 727 m)       Wt Readings from Last 3 Encounters:   06/09/21 74 8 kg (165 lb)   05/26/21 75 8 kg (167 lb)   05/04/21 76 kg (167 lb 8 oz)        Temp Readings from Last 3 Encounters:   06/09/21 97 9 °F (36 6 °C) (Temporal)   05/26/21 97 8 °F (36 6 °C)   05/04/21 (!) 97 1 °F (36 2 °C) (Temporal)        BP Readings from Last 3 Encounters:   06/09/21 128/66   05/26/21 101/52   05/04/21 120/68         Pulse Readings from Last 3 Encounters:   06/09/21 64   05/26/21 56   05/04/21 63         Appears comfortable, no major findings  GEN: Alert, awake oriented x3, in no acute distress  HEENT- No pallor, icterus, cyanosis, no oral mucosal lesions,   LAD - no palpable cervical, clavicle, axillary, inguinal LAD  Heart- normal S1 S2, regular rate and rhythm, No murmur, rubs     Lungs- decreased breathing sound bilateral    Abdomen- soft, Non tender, bowel sounds present  Extremities- No cyanosis, clubbing, edema  Neuro- No focal neurological deficit           PAST MEDICAL HISTORY:   has a past medical history of Cardiomyopathy ([facility] ), [facility]congestive [facility] failure) [facility] ), COPD (chronic obstructive pulmonary disease) ([facility] ), Dyspnea on exertion, Essential hypertension, and Non-ischemic cardiomyopathy ([facility] )  PAST SURGICAL HISTORY:   has a past surgical history that includes PILONIDAL CYST EXCISION and IR biopsy bone marrow (5/26/2021)  CURRENT MEDICATIONS:   Current Outpatient Medications   Medication Sig Dispense Refill    aspirin (ECOTRIN LOW STRENGTH) 81 mg EC tablet Take 81 mg by mouth daily      atorvastatin (LIPITOR) 20 mg tablet TAKE 1 TABLET BY MOUTH IN  THE EVENING 90 tablet 3    bisoprolol (ZEBETA) 5 mg tablet TAKE ONE-HALF TABLET BY  MOUTH TWO TIMES DAILY 90 tablet 3    furosemide (LASIX) 20 mg tablet TAKE 2 TABLETS BY MOUTH  EVERY MORNING AND 1 TABLET  BY MOUTH IN THE AFTERNOON  FOR TOTAL OF 60MG PER  tablet 3    Incruse Ellipta 62 5 MCG/INH AEPB inhaler USE 1 PUFF BY MOUTH DAILY 90 each 3    sacubitril-valsartan (Entresto)  MG TABS Take 1 tablet by mouth 2 (two) times a day 180 tablet 3    Ventolin  (90 Base) MCG/ACT inhaler USE 2 INHALATIONS BY MOUTH  EVERY 6 HOURS AS NEEDED FOR WHEEZING 54 g 3     No current facility-administered medications for this visit  [unfilled]    SOCIAL HISTORY:   reports that he quit smoking about 3 years ago  His smoking use included cigarettes  He has a 60 00 pack-year smoking history  He has never used smokeless tobacco  He reports current alcohol use of about 2 0 standard drinks of alcohol per week  He reports that he does not use drugs  FAMILY HISTORY:  family history includes Breast cancer (age of onset: 55) in his mother; Breast cancer (age of onset: 48) in his sister; Cancer in his family and father; Coronary artery disease in his maternal uncle; Heart disease in his maternal uncle; Heart failure in his maternal uncle; Hypertension in his father  ALLERGIES:  has No Known Allergies      REVIEW OF SYSTEMS:  Please note that a 14-point review of systems was performed to include Constitutional, HEENT, Respiratory, CVS, GI, , Musculoskeletal, Integumentary, Neurologic, Rheumatologic, Endocrinologic, Psychiatric, Lymphatic, and Hematologic/Oncologic systems were reviewed and are negative unless otherwise stated in HPI  Positive and negative findings pertinent to this evaluation are incorporated into the history of present illness  LAB:  Lab Results   Component Value Date    WBC 3 54 (L) 06/02/2021    HGB 9 0 (L) 06/02/2021    HCT 27 8 (L) 06/02/2021     (H) 06/02/2021    PLT 84 (L) 06/02/2021     Lab Results   Component Value Date     10/22/2015    SODIUM 140 04/20/2021    K 4 9 04/20/2021     04/20/2021    CO2 27 04/20/2021    ANIONGAP 3 (L) 10/22/2015    AGAP 9 04/20/2021    BUN 35 (H) 04/20/2021    CREATININE 1 63 (H) 04/20/2021    GLUC 93 12/06/2016    GLUF 98 04/20/2021    CALCIUM 8 6 04/20/2021    AST 14 04/20/2021    ALT 16 04/20/2021    ALKPHOS 68 04/20/2021    PROT 7 7 10/22/2015    TP 7 7 05/04/2021    BILITOT 0 69 10/22/2015    TBILI 0 42 04/20/2021    EGFR 41 04/20/2021       CBC with diff:       Invalid input(s):  RBC, TOTALCELLSCOUNTED, SEGS%, GRANS%, LYMPHS%, EOS%, BASO%, ABNEUT, ABGRANS, ABLYMPHS, ABMOMOS, ABEOS, ABBASO    CMP:      Invalid input(s): ALBUMIN        IMAGING:  No orders to display     Ct Chest Wo Contrast    Result Date: 5/29/2021  Narrative: CT CHEST WITHOUT IV CONTRAST INDICATION:   R91 1: Solitary pulmonary nodule  COMPARISON:  July 6, 2020, July 18, 2019 TECHNIQUE: CT examination of the chest was performed without intravenous contrast   Axial, sagittal, and coronal 2D reformatted images were created from the source data and submitted for interpretation  Radiation dose length product (DLP) for this visit:  243 mGy-cm   This examination, like all CT scans performed in the [facility], was performed utilizing techniques to minimize radiation dose exposure, including the use of iterative reconstruction and automated exposure control  FINDINGS: LUNGS:  Background centrilobular emphysema seen in the upper lung zones    Minor component of peribronchial thickening in the lower lobes  The previously described right lower lobe nodule from 2019 is resolved  There remains linear and nodular scarlike opacities in the lungs bilaterally, right lung base to the greatest degree, some of the scarring is mostly subpleural in location  No interval suspicious change these findings  These findings are seen in association with calcified and noncalcified pleural plaques suggesting findings related to asbestos exposure /asbestosis  These findings appear stable  PLEURA:  Unremarkable  HEART/GREAT VESSELS:  Cardiomegaly with moderate left ventricular dilatation  MEDIASTINUM AND MAGGIE:  Unremarkable  CHEST WALL AND LOWER NECK:   Unremarkable  VISUALIZED STRUCTURES IN THE UPPER ABDOMEN:  Unremarkable  OSSEOUS STRUCTURES:  No acute fracture or destructive osseous lesion  Impression: Bilateral calcified and noncalcified pleural plaques  Linear and subpleural scarring seen predominating in the lung bases  Similar overall appearance to priors  Findings likely related to asbestos exposure/asbestosis  Mild peribronchial thickening, stable  No recurrent right lower lobe nodule as described in 2019  No new suspicious lesion  Background mild to moderate centrilobular emphysema  Cardiomegaly and left ventricular dilatation  Workstation performed: [id number]     Us Abdomen Complete    Result Date: 5/18/2021  Narrative: ABDOMEN ULTRASOUND, COMPLETE INDICATION:   D61 818: Other pancytopenia  COMPARISON: None TECHNIQUE:   Real-time ultrasound of the abdomen was performed with a curvilinear transducer with both volumetric sweeps and still imaging techniques  FINDINGS: PANCREAS:  Visualized portions of the pancreas are within normal limits  AORTA AND IVC:  Visualized portions are normal for patient age  LIVER: Size:  Within normal range  The liver measures 14 7 cm in the midclavicular line  Contour:  Surface contour is smooth   Parenchyma:  Echogenicity and echotexture are within normal limits  No evidence of suspicious mass  Limited imaging of the main portal vein shows it to be patent and hepatopetal  BILIARY: No gallbladder findings  No intrahepatic biliary dilatation  CBD measures 3 mm  No choledocholithiasis  Negative Licea's sign KIDNEY: Right kidney measures 9 3 x 4 0 cm  Within normal limits  Left kidney measures 9 9 x 4 9 cm  Within normal limits  SPLEEN: Measures 8 9 x 8 5 x 3 4 cm  Within normal limits  ASCITES:  None  Impression: Normal  Workstation performed: [id number]     Ir Biopsy Bone Marrow    Result Date: 5/26/2021  Narrative: PROCEDURE: CT-guided bone marrow biopsy and bone marrow aspiration STAFF: PARVIN Hines Amen: 139 23 mGy-cm  This examination, like all CT scans performed in the [facility], was performed utilizing techniques to minimize radiation dose exposure, including the use of iterative reconstruction and automated exposure control  NUMBER OF IMAGES: Multiple  COMPLICATIONS: None  MEDICATIONS: Versed 2 milligrams iv  Fentanyl 100 micrograms iv  Moderate sedation was monitored by radiology nursing staff  Monitoring of conscious sedation totaled 15 minutes  INDICATION: Pancytopenia PROCEDURE: Using intermittent CT guidance, the [employer] system was used to perform bone marrow aspirate via the left posterior superior iliac spine  Approximately 6 mL of marrow was removed and sent to pathology  Next, bone marrow biopsy was performed  The biopsy specimen was placed in formalin and sent to pathology  The needle was then removed and hemostasis was achieved using manual compression  Impression: Bone marrow aspiration and biopsy   Workstation performed: [id number]

## 2021-06-09 NOTE — LETTER
2021     Alaina Espinoza DO  [address]    Patient: Jessica Valles   YOB: 1945   Date of Visit: 2021       Dear Dr Isaias Costa: Thank you for referring Aysha Saavedra to me for evaluation  Below are my notes for this consultation  If you have questions, please do not hesitate to call me  I look forward to following your patient along with you  Sincerely,        Edin Cabrales MD PhD        CC: No Recipients  Edin Cabrales MD PhD  2021  4:37 PM  Signed  HEMATOLOGY / [address] NOTE    Primary Care Provider: Alaina Espinoza DO  Referring Provider:    MRN: [id number]  : 1945    Reason for Encounter:  Chief Complaint   Patient presents with    Follow-up     Pancytopenia         Hematology / Oncology History:     Jessica Valles is a 78 y o  male who came in for follow up        1, aplastic anemia  - bone marrow biopsy in 2021 showed stem cell 5%  - mild, ANC 1 4 K    2,   pancytopenia going on for to 3 years  3, drink beers daily, 2 beers a day    Assessment / Plan:     1  Aplastic anemia (HCC)  - made patient aware diagnosis, prognosis, treatment options  Given this is mild form of aplastic anemia, no frequent infection, no bleeding, ANC is 1 4 K, has been stable for the past 2 years, no aggressive treatment needed will continue to follow patient  Plan to repeat labs in 3 months and follow patient after  If ANC less than 500 will consider start treatment  May consider ATG or chemotherapy Cytoxan, patient need to be evaluated by bone marrow  at that time  2  Pancytopenia ([address] )  - as above    3  B12 deficiency  - borderline, patient will take over-the-counter multivitamin  4  Unhealthy alcohol drinking behavior  - made patient aware alcohol drinking is toxic to bone marrow, ideally patient should stop drinking    At least he should significantly cut down beer drinking to 2 beers a  week                          minutes were spent face to face with patient with greater than 50% of the time spent in counseling or coordination of care including discussions of treatment instructions  All of the patient's questions were answered to their satisfactory during this discussion  Advised pt to call if there is any further questions  Interval History:     6/9/2021:  Patient came for follow-up, subjectively doing okay no infection no bleeding  Body weight stable no constitutional symptoms  No autoimmune disease  Problem list:     Patient Active Problem List   Diagnosis    [facility]mic [facility] )    Essential hypertension    Dyspnea on exertion    COPD, severe (HCC)    Abnormal CXR    Tobacco abuse in remission    Aphasia    Pulmonary nodule    History of stroke    Slurred speech    [facility]    Unhealthy alcohol drinking behavior    B12 deficiency       PHYSICIAL EXAMINATION:       Vital Signs:   /66   Pulse 64   Temp 97 9 °F (36 6 °C) (Temporal)   Resp 16   Ht 5' 8" (1 727 m)   Wt 74 8 kg (165 lb)   SpO2 97%   BMI 25 09 kg/m²   Body surface area is 1 88 meters squared  Ht Readings from Last 3 Encounters:   06/09/21 5' 8" (1 727 m)   05/26/21 5' 8" (1 727 m)   05/04/21 5' 8" (1 727 m)       Wt Readings from Last 3 Encounters:   06/09/21 74 8 kg (165 lb)   05/26/21 75 8 kg (167 lb)   05/04/21 76 kg (167 lb 8 oz)        Temp Readings from Last 3 Encounters:   06/09/21 97 9 °F (36 6 °C) (Temporal)   05/26/21 97 8 °F (36 6 °C)   05/04/21 (!) 97 1 °F (36 2 °C) (Temporal)        BP Readings from Last 3 Encounters:   06/09/21 128/66   05/26/21 101/52   05/04/21 120/68         Pulse Readings from Last 3 Encounters:   06/09/21 64   05/26/21 56   05/04/21 63         Appears comfortable, no major findings          GEN: Alert, awake oriented x3, in no acute distress  HEENT- No pallor, icterus, cyanosis, no oral mucosal lesions,   LAD - no palpable cervical, clavicle, axillary, inguinal LAD  Heart- normal S1 S2, regular rate and rhythm, No murmur, rubs  Lungs- decreased breathing sound bilateral    Abdomen- soft, Non tender, bowel sounds present  Extremities- No cyanosis, clubbing, edema  Neuro- No focal neurological deficit           PAST MEDICAL HISTORY:   has a past medical history of Cardiomyopathy ([facility] ), CHF (congestive heart failure) ([facility] ), COPD (chronic obstructive pulmonary disease) ([facility] ), Dyspnea on exertion, Essential hypertension, and Non-ischemic cardiomyopathy ([facility] )  PAST SURGICAL HISTORY:   has a past surgical history that includes PILONIDAL CYST EXCISION and IR biopsy bone marrow (5/26/2021)  CURRENT MEDICATIONS:   Current Outpatient Medications   Medication Sig Dispense Refill    aspirin (ECOTRIN LOW STRENGTH) 81 mg EC tablet Take 81 mg by mouth daily      atorvastatin (LIPITOR) 20 mg tablet TAKE 1 TABLET BY MOUTH IN  THE EVENING 90 tablet 3    bisoprolol (ZEBETA) 5 mg tablet TAKE ONE-HALF TABLET BY  MOUTH TWO TIMES DAILY 90 tablet 3    furosemide (LASIX) 20 mg tablet TAKE 2 TABLETS BY MOUTH  EVERY MORNING AND 1 TABLET  BY MOUTH IN THE AFTERNOON  FOR TOTAL OF 60MG PER  tablet 3    Incruse Ellipta 62 5 MCG/INH AEPB inhaler USE 1 PUFF BY MOUTH DAILY 90 each 3    sacubitril-valsartan (Entresto)  MG TABS Take 1 tablet by mouth 2 (two) times a day 180 tablet 3    Ventolin  (90 Base) MCG/ACT inhaler USE 2 INHALATIONS BY MOUTH  EVERY 6 HOURS AS NEEDED FOR WHEEZING 54 g 3     No current facility-administered medications for this visit  [unfilled]    SOCIAL HISTORY:   reports that he quit smoking about 3 years ago  His smoking use included cigarettes  He has a 60 00 pack-year smoking history  He has never used smokeless tobacco  He reports current alcohol use of about 2 0 standard drinks of alcohol per week  He reports that he does not use drugs       FAMILY HISTORY:  family history includes Breast cancer (age of onset: 55) in his mother; Breast cancer (age of onset: 48) in his sister; Cancer in his family and father; Coronary artery disease in his maternal uncle; Heart disease in his maternal uncle; Heart failure in his maternal uncle; Hypertension in his father  ALLERGIES:  has No Known Allergies  REVIEW OF SYSTEMS:  Please note that a 14-point review of systems was performed to include Constitutional, HEENT, Respiratory, CVS, GI, , Musculoskeletal, Integumentary, Neurologic, Rheumatologic, Endocrinologic, Psychiatric, Lymphatic, and Hematologic/Oncologic systems were reviewed and are negative unless otherwise stated in HPI  Positive and negative findings pertinent to this evaluation are incorporated into the history of present illness  LAB:  Lab Results   Component Value Date    WBC 3 54 (L) 06/02/2021    HGB 9 0 (L) 06/02/2021    HCT 27 8 (L) 06/02/2021     (H) 06/02/2021    PLT 84 (L) 06/02/2021     Lab Results   Component Value Date     10/22/2015    SODIUM 140 04/20/2021    K 4 9 04/20/2021     04/20/2021    CO2 27 04/20/2021    ANIONGAP 3 (L) 10/22/2015    AGAP 9 04/20/2021    BUN 35 (H) 04/20/2021    CREATININE 1 63 (H) 04/20/2021    GLUC 93 12/06/2016    GLUF 98 04/20/2021    CALCIUM 8 6 04/20/2021    AST 14 04/20/2021    ALT 16 04/20/2021    ALKPHOS 68 04/20/2021    PROT 7 7 10/22/2015    TP 7 7 05/04/2021    BILITOT 0 69 10/22/2015    TBILI 0 42 04/20/2021    EGFR 41 04/20/2021       CBC with diff:       Invalid input(s):  RBC, TOTALCELLSCOUNTED, SEGS%, GRANS%, LYMPHS%, EOS%, BASO%, ABNEUT, ABGRANS, ABLYMPHS, ABMOMOS, ABEOS, ABBASO    CMP:      Invalid input(s): ALBUMIN        IMAGING:  No orders to display     Ct Chest Wo Contrast    Result Date: 5/29/2021  Narrative: CT CHEST WITHOUT IV CONTRAST INDICATION:   R91 1: Solitary pulmonary nodule   COMPARISON:  July 6, 2020, July 18, 2019 TECHNIQUE: CT examination of the chest was performed without intravenous contrast   Axial, sagittal, and coronal 2D reformatted images were created from the source data and submitted for interpretation  Radiation dose length product (DLP) for this visit:  243 mGy-cm   This examination, like all CT scans performed in the [facility], was performed utilizing techniques to minimize radiation dose exposure, including the use of iterative reconstruction and automated exposure control  FINDINGS: LUNGS:  Background centrilobular emphysema seen in the upper lung zones  Minor component of peribronchial thickening in the lower lobes  The previously described right lower lobe nodule from 2019 is resolved  There remains linear and nodular scarlike opacities in the lungs bilaterally, right lung base to the greatest degree, some of the scarring is mostly subpleural in location  No interval suspicious change these findings  These findings are seen in association with calcified and noncalcified pleural plaques suggesting findings related to asbestos exposure /asbestosis  These findings appear stable  PLEURA:  Unremarkable  HEART/GREAT VESSELS:  Cardiomegaly with moderate left ventricular dilatation  MEDIASTINUM AND MAGGIE:  Unremarkable  CHEST WALL AND LOWER NECK:   Unremarkable  VISUALIZED STRUCTURES IN THE UPPER ABDOMEN:  Unremarkable  OSSEOUS STRUCTURES:  No acute fracture or destructive osseous lesion  Impression: Bilateral calcified and noncalcified pleural plaques  Linear and subpleural scarring seen predominating in the lung bases  Similar overall appearance to priors  Findings likely related to asbestos exposure/asbestosis  Mild peribronchial thickening, stable  No recurrent right lower lobe nodule as described in 2019  No new suspicious lesion  Background mild to moderate centrilobular emphysema  Cardiomegaly and left ventricular dilatation  Workstation performed: [id number]     Us Abdomen Complete    Result Date: 5/18/2021  Narrative: ABDOMEN ULTRASOUND, COMPLETE INDICATION:   D61 818: Other pancytopenia   COMPARISON: None TECHNIQUE:   Real-time ultrasound of the abdomen was performed with a curvilinear transducer with both volumetric sweeps and still imaging techniques  FINDINGS: PANCREAS:  Visualized portions of the pancreas are within normal limits  AORTA AND IVC:  Visualized portions are normal for patient age  LIVER: Size:  Within normal range  The liver measures 14 7 cm in the midclavicular line  Contour:  Surface contour is smooth  Parenchyma:  Echogenicity and echotexture are within normal limits  No evidence of suspicious mass  Limited imaging of the main portal vein shows it to be patent and hepatopetal  BILIARY: No gallbladder findings  No intrahepatic biliary dilatation  CBD measures 3 mm  No choledocholithiasis  Negative Licea's sign KIDNEY: Right kidney measures 9 3 x 4 0 cm  Within normal limits  Left kidney measures 9 9 x 4 9 cm  Within normal limits  SPLEEN: Measures 8 9 x 8 5 x 3 4 cm  Within normal limits  ASCITES:  None  Impression: Normal  Workstation performed: [id number]     Ir Biopsy Bone Marrow    Result Date: 5/26/2021  Narrative: PROCEDURE: CT-guided bone marrow biopsy and bone marrow aspiration STAFF: PARVIN Brunner Spoon: 139 23 mGy-cm  This examination, like all CT scans performed in the [facility], was performed utilizing techniques to minimize radiation dose exposure, including the use of iterative reconstruction and automated exposure control  NUMBER OF IMAGES: Multiple  COMPLICATIONS: None  MEDICATIONS: Versed 2 milligrams iv  Fentanyl 100 micrograms iv  Moderate sedation was monitored by radiology nursing staff  Monitoring of conscious sedation totaled 15 minutes  INDICATION: Pancytopenia PROCEDURE: Using intermittent CT guidance, the [employer] system was used to perform bone marrow aspirate via the left posterior superior iliac spine  Approximately 6 mL of marrow was removed and sent to pathology  Next, bone marrow biopsy was performed   The biopsy specimen was placed in formalin and sent to pathology  The needle was then removed and hemostasis was achieved using manual compression  Impression: Bone marrow aspiration and biopsy   Workstation performed: [id number]

## 2021-06-10 ENCOUNTER — EVALUATION (OUTPATIENT)
Dept: SPEECH THERAPY | Facility: CLINIC | Age: 76
End: 2021-06-10
Payer: MEDICARE

## 2021-06-10 DIAGNOSIS — Z86.73 HISTORY OF STROKE: ICD-10-CM

## 2021-06-10 DIAGNOSIS — R47.01 APHASIA: Primary | ICD-10-CM

## 2021-06-10 PROCEDURE — 92523 SPEECH SOUND LANG COMPREHEN: CPT | Performed by: SPEECH-LANGUAGE PATHOLOGIST

## 2021-06-14 ENCOUNTER — OFFICE VISIT (OUTPATIENT)
Dept: SPEECH THERAPY | Facility: CLINIC | Age: 76
End: 2021-06-14
Payer: MEDICARE

## 2021-06-14 DIAGNOSIS — R47.01 APHASIA: Primary | ICD-10-CM

## 2021-06-14 DIAGNOSIS — Z86.73 HISTORY OF STROKE: ICD-10-CM

## 2021-06-14 PROCEDURE — 92507 TX SP LANG VOICE COMM INDIV: CPT

## 2021-06-14 NOTE — PROGRESS NOTES
Daily Speech Treatment Note    Today's date: 2021  Patients name: Tone Sosa  : 1945  MRN: [id number]  Safety measures: h/o CVA, HTN, COPD, aphasia  Referring provider: Moi Perdomo DO    Primary diagnosis/billing code: R47 01  Secondary diagnosis/billing code: [id number]    Visit tracking:  -Referring provider: Epic  -Billing guidelines: CMS  -Visit #2/10 (total visits: 8)   -Insurance: [place] [address] due 07/10/2021    Subjective/Behavioral:  -"She gave me homework "     Objective/Assessment:  -Reviewed patient's home exercises/activities completed since last appointment  Patient completed "Scattergories" task with 80% accuracy  Patient completed synonym task with 90% accuracy  Short-term goals:  2  Patient will name an average of 15+ items in a category in 60 seconds over 5 trials using compensatory strategies to facilitate improved word retrieval skills (to be achieved in 4-6 weeks)  -- PARTIALLY MET    3  Patient will name an average of 10+ words that begin with a specific letter in 60 seconds over 5 trials using compensatory strategies to facilitate improved word retrieval skills (to be achieved in 4-6 weeks)  -- PARTIALLY MET    4  Patient will complete concrete and abstract categorization tasks to 80% accuracy to facilitate improved generative naming skills and working memory (to be achieved in 4-6 weeks)  -- PARTIALLY MET    To target categorization: Patient was presented with a category and was instructed to state at least 3 items in each category  Patient completed this task in 18/20 (90%) opportunities independently, increasing to 20/20 (100%) opportunities given min verbal cues  5  Patient will complete word generation tasks (e g , analogies, category matrices, synonyms/antonyms, etc ) with 80% accuracy using word finding strategies to facilitate improved word retrieval skills (to be achieved in 4-6 weeks)   -- PARTIALLY MET    To target word generation: Patient was presented with analogies with the last blank missing (e g  Martin Collado is to sing as dog is to ___)  Patient completed analogies in 22/24 (92%) opportunities independently, increasing to 24/24 (100%) opportunities given semantic cues  6  Patient will name up to 5 features to describe a person/place/thing with 80% accuracy to facilitate improved utilization of circumlocution strategy (to be achieved in 4-6 weeks)  -- NOT ADDRESSED    To target education and practice of the circumlocution strategy, patient was asked to provide verbal/semantic descriptions of different people/places/things using "[employer] technologies" cards  Clinician guess what the Patient described in 10/10 (100%) opportunities independently  Patient guessed what the Clinician described 10/10 (100%) opportunities independently  Plan:  -Patient was provided with home exercises/activities to target goals in plan of care at the end of today's session   -Continue with current plan of care

## 2021-06-16 ENCOUNTER — OFFICE VISIT (OUTPATIENT)
Dept: SPEECH THERAPY | Facility: CLINIC | Age: 76
End: 2021-06-16
Payer: MEDICARE

## 2021-06-16 DIAGNOSIS — Z86.73 HISTORY OF STROKE: ICD-10-CM

## 2021-06-16 DIAGNOSIS — R47.01 APHASIA: Primary | ICD-10-CM

## 2021-06-16 PROCEDURE — 92507 TX SP LANG VOICE COMM INDIV: CPT

## 2021-06-16 NOTE — PROGRESS NOTES
Daily Speech Treatment Note    Today's date: 2021  Patients name: Matilda Whyte  : 1945  MRN: [id number]  Safety measures: h/o CVA, HTN, COPD, aphasia  Referring provider: Greg Del Cid DO    Primary diagnosis/billing code: R47 01  Secondary diagnosis/billing code: [id number]    Visit tracking:  -Referring provider: Epic  -Billing guidelines: CMS  -Visit #3/10 (total visits: 9)   -Insurance: [address] [place] [address] due 07/10/2021    Subjective/Behavioral:  -Patient was engaged in all therapy tasks during today's session  Objective/Assessment:  -Reviewed patient's home exercises/activities completed since last appointment  Patient completed constant characteristics with 100% accuracy  Patient completed analogies with 100% accuracy  Short-term goals:  2  Patient will name an average of 15+ items in a category in 60 seconds over 5 trials using compensatory strategies to facilitate improved word retrieval skills (to be achieved in 4-6 weeks)  -- PARTIALLY MET    To target word finding and attention; patient completed the card game "Anomia" where they are asked to name people/places/things based on category presented on card (i e , artificial sweetener)  Target of game was for speed of naming and processing  Pt completed level 4 with average of 9 2 cards min (WNL; goal is 10+)  Pt skipped cards x 5     3  Patient will name an average of 10+ words that begin with a specific letter in 60 seconds over 5 trials using compensatory strategies to facilitate improved word retrieval skills (to be achieved in 4-6 weeks)  -- PARTIALLY MET    To target generative naming: Patient named an average of 12 words per minute given a specific letter  Patient completed this task over 5 trials, Patient was presented with vowel sounds to help guide thinking       4  Patient will complete concrete and abstract categorization tasks to 80% accuracy to facilitate improved generative naming skills and working memory (to be achieved in 4-6 weeks)  -- PARTIALLY MET    5  Patient will complete word generation tasks (e g , analogies, category matrices, synonyms/antonyms, etc ) with 80% accuracy using word finding strategies to facilitate improved word retrieval skills (to be achieved in 4-6 weeks)  -- PARTIALLY MET    To target word generation: Patient was presented with two definitions and was instructed to name the word being described (e g  A pair that goes together, A small wooden stick used to light a fire = Match)  Patient completed this task in 16/20 (80%) opportunities independently, increasing to 20/20 (100%) opportunities given semantic cues  To target word generation: Patient was presented with a word and was instructed to name at least two definitions for the word  Patient completed this task in 17/20 (85%) opportunities independently, increasing to 20/20 (100%) opportunities given semantic cues  To target word generation: Patient was presented with a target word and was instructed to name one word that means the opposite (I e  Antonyms)  Patient completed this task in 9/10 (90%) opportunities independently, increasing to 10/10 (100%) opportunities given semantic cues  6  Patient will name up to 5 features to describe a person/place/thing with 80% accuracy to facilitate improved utilization of circumlocution strategy (to be achieved in 4-6 weeks)  -- NOT ADDRESSED      Plan:  -Patient was provided with home exercises/activities to target goals in plan of care at the end of today's session   -Continue with current plan of care

## 2021-06-20 NOTE — PROGRESS NOTES
Daily Speech Treatment Note    Today's date: 2021   Patients name: Georgie Mead  : 1945  MRN: [id number]  Safety measures: h/o CVA, HTN, COPD, aphasia  Referring provider: Linsey Beck DO    Primary diagnosis/billing code: R47 01  Secondary diagnosis/billing code: [id number]    Visit tracking:  -Referring provider: Epic  -Billing guidelines: CMS  -Visit #6/10 (total visits: 12)   -Insurance: R [place] [address] due 07/10/2021    Subjective/Behavioral:  -Patient actively participated in all activities during today's session  Objective/Assessment:  -Reviewed patient's home exercises/activities completed since last appointment  Word deduction provided single letter clues completed  opp  Short-term goals:  2  Patient will name an average of 15+ items in a category in 60 seconds over 5 trials using compensatory strategies to facilitate improved word retrieval skills (to be achieved in 4-6 weeks)  -- PARTIALLY MET    3  Patient will name an average of 10+ words that begin with a specific letter in 60 seconds over 5 trials using compensatory strategies to facilitate improved word retrieval skills (to be achieved in 4-6 weeks)  -- PARTIALLY MET    4  Patient will complete concrete and abstract categorization tasks to 80% accuracy to facilitate improved generative naming skills and working memory (to be achieved in 4-6 weeks)  -- PARTIALLY MET    5  Patient will complete word generation tasks (e g , analogies, category matrices, synonyms/antonyms, etc ) with 80% accuracy using word finding strategies to facilitate improved word retrieval skills (to be achieved in 4-6 weeks)  -- PARTIALLY MET  -Double meaning word deduction: Patient was presented with written definitions and asked to solve for clue words  All of the answers related to a common theme   Task completed in  opp (78% acc) independently, increasing to  opp (88% acc) with min-mod semantic cues, increasing to  opp (100% acc) with min phonemic cues  6  Patient will name up to 5 features to describe a person/place/thing with 80% accuracy to facilitate improved utilization of circumlocution strategy (to be achieved in 4-6 weeks)  -- NOT ADDRESSED  -Higher-level expressive language/thought organization: Patient was presented with the first half of a common proverb/expression and asked to (1) complete the end of it and (2) describe its figurative meaning  Completion task completed in 17/20 opp (85% acc) independently, increasing to 19/20 opp (95% acc) with min semantic cues  Descriptive task completed in 2/20 opp (10% acc) independently, increasing to 10/20 opp (20% acc) with mod verbal cues, increasing to 20/20 opp (100% acc) with max verbal cues for expansion  Patient with increased difficulty with connected speech task (describing meaning)  Plan:  -Patient was provided with home exercises/activities to target goals in plan of care at the end of today's session   -Continue with current plan of care

## 2021-06-20 NOTE — PROGRESS NOTES
Daily Speech Treatment Note    Today's date: 2021  Patients name: Delfin Cates  : 1945  MRN: [id number]  Safety measures: h/o CVA, HTN, COPD, aphasia  Referring provider: Andres Bazan DO    Primary diagnosis/billing code: R47 01  Secondary diagnosis/billing code: [id number]    Visit tracking:  -Referring provider: Epic  -Billing guidelines: CMS  -Visit #4/10 (total visits: 10)   -Insurance: [address] [place] [address] due 07/10/2021    Subjective/Behavioral:  -Patient was in good spirits during today's session  Objective/Assessment:  -Reviewed patient's home exercises/activities completed since last appointment  Unscrambling synonym & antonym pairs completed in  opp  Providing an opposite completed in  opp  Short-term goals:  2  Patient will name an average of 15+ items in a category in 60 seconds over 5 trials using compensatory strategies to facilitate improved word retrieval skills (to be achieved in 4-6 weeks)  -- PARTIALLY MET    3  Patient will name an average of 10+ words that begin with a specific letter in 60 seconds over 5 trials using compensatory strategies to facilitate improved word retrieval skills (to be achieved in 4-6 weeks)  -- PARTIALLY MET    4  Patient will complete concrete and abstract categorization tasks to 80% accuracy to facilitate improved generative naming skills and working memory (to be achieved in 4-6 weeks)  -- PARTIALLY MET  -Generative naming: Patient instructed to name 5 words for a given category  Task completed in 44/45 opp (98% acc) independently, increasing to 45/45 opp (100% acc) with min semantic cues  5  Patient will complete word generation tasks (e g , analogies, category matrices, synonyms/antonyms, etc ) with 80% accuracy using word finding strategies to facilitate improved word retrieval skills (to be achieved in 4-6 weeks)   -- PARTIALLY MET  -Semantic language: Patient presented with a list of multiple meaning words (e g , match, cold, tire, etc ) and asked to provide at least 2 definitions for each word  Task completed in 17/20 opp (85% acc) independently, increasing to 20/20 opp (100% acc) with min semantic cues  6  Patient will name up to 5 features to describe a person/place/thing with 80% accuracy to facilitate improved utilization of circumlocution strategy (to be achieved in 4-6 weeks)  -- NOT ADDRESSED  -Patient initiated in increased amount of conversation today, which was a great improvement  Patient demonstrated moderate anomia, but benefited from increased time for processing and word retrieval      Plan:  -Patient was provided with home exercises/activities to target goals in plan of care at the end of today's session   -Continue with current plan of care

## 2021-06-21 ENCOUNTER — OFFICE VISIT (OUTPATIENT)
Dept: SPEECH THERAPY | Facility: CLINIC | Age: 76
End: 2021-06-21
Payer: MEDICARE

## 2021-06-21 DIAGNOSIS — Z86.73 HISTORY OF STROKE: ICD-10-CM

## 2021-06-21 DIAGNOSIS — R47.01 APHASIA: Primary | ICD-10-CM

## 2021-06-21 PROCEDURE — 92507 TX SP LANG VOICE COMM INDIV: CPT | Performed by: SPEECH-LANGUAGE PATHOLOGIST

## 2021-06-22 NOTE — PROGRESS NOTES
Daily Speech Treatment Note    Today's date: 2021  Patients name: Arvind Kirby  : 1945  MRN: [id number]  Safety measures: h/o CVA, HTN, COPD, aphasia  Referring provider: Ana Cristina Verma DO    Primary diagnosis/billing code: R47 01  Secondary diagnosis/billing code: [id number]    Visit tracking:  -Referring provider: Epic  -Billing guidelines: CMS  -Visit #7/10 (total visits: 13)   -Insurance: [address] [place] [address] due 07/10/2021    Subjective/Behavioral:  -Patient was in good spirits during today's session  Objective/Assessment:  -Reviewed patient's home exercises/activities completed since last appointment  Convergent reasoning task completed in 14/15 opp  Idiom completion completed in  opp  Short-term goals:  2  Patient will name an average of 15+ items in a category in 60 seconds over 5 trials using compensatory strategies to facilitate improved word retrieval skills (to be achieved in 4-6 weeks)  -- PARTIALLY MET    3  Patient will name an average of 10+ words that begin with a specific letter in 60 seconds over 5 trials using compensatory strategies to facilitate improved word retrieval skills (to be achieved in 4-6 weeks)  -- PARTIALLY MET    4  Patient will complete concrete and abstract categorization tasks to 80% accuracy to facilitate improved generative naming skills and working memory (to be achieved in 4-6 weeks)  -- PARTIALLY MET    5  Patient will complete word generation tasks (e g , analogies, category matrices, synonyms/antonyms, etc ) with 80% accuracy using word finding strategies to facilitate improved word retrieval skills (to be achieved in 4-6 weeks)  -- PARTIALLY MET    6  Patient will name up to 5 features to describe a person/place/thing with 80% accuracy to facilitate improved utilization of circumlocution strategy (to be achieved in 4-6 weeks)   -- NOT ADDRESSED  -Higher-level expressive language/thought organization: Patient was presented with the first half of a common proverb/expression and asked to (1) complete the end of it and (2) describe its figurative meaning  Completion task completed in 20/20 opp (100% acc) independently for homework  Descriptive task completed in 4/20 opp (20% acc) independently, increasing to 12/20 opp (60% acc) with mod verbal cues, increasing to 20/20 opp (100% acc) with max verbal cues for expansion  Patient with increased difficulty with connected speech task (describing meaning)  -Higher-level verbal expression: Patient was presented with an intangible word list (e g , beauty, compassion, brutality, etc ) and asked to describe the target words to clinician to practice word finding/circumlocution  Patient provided appropriate clues in 11/15 opp (73% acc)  Patient guessed clue words presented by clinician in 12/12 opp (100% acc)  Discussed strategies to assist with word finding abilities in daily life  Plan:  -Patient was provided with home exercises/activities to target goals in plan of care at the end of today's session   -Continue with current plan of care

## 2021-06-24 ENCOUNTER — OFFICE VISIT (OUTPATIENT)
Dept: SPEECH THERAPY | Facility: CLINIC | Age: 76
End: 2021-06-24
Payer: MEDICARE

## 2021-06-24 DIAGNOSIS — R47.01 APHASIA: Primary | ICD-10-CM

## 2021-06-24 DIAGNOSIS — Z86.73 HISTORY OF STROKE: ICD-10-CM

## 2021-06-24 PROCEDURE — 92507 TX SP LANG VOICE COMM INDIV: CPT | Performed by: SPEECH-LANGUAGE PATHOLOGIST

## 2021-06-27 NOTE — PROGRESS NOTES
Daily Speech Treatment Note    Today's date: 2021  Patients name: Irina Lee  : 1945  MRN: [id number]  Safety measures: h/o CVA, HTN, COPD, aphasia  Referring provider: Mercedes Aguilar DO    Primary diagnosis/billing code: R47 01  Secondary diagnosis/billing code: [id number]    Visit tracking:  -Referring provider: Epic  -Billing guidelines: CMS  -Visit #8/10 (total visits: 14)   -Insurance: R [place] [address] due 07/10/2021    Subjective/Behavioral:  -Patient was in good spirits during today's session  Objective/Assessment:  -Reviewed patient's home exercises/activities completed since last appointment  Analogies worksheets completed  End- opp  Second half-15/20 opp  First half- opp  Short-term goals:  2  Patient will name an average of 15+ items in a category in 60 seconds over 5 trials using compensatory strategies to facilitate improved word retrieval skills (to be achieved in 4-6 weeks)  -- PARTIALLY MET    3  Patient will name an average of 10+ words that begin with a specific letter in 60 seconds over 5 trials using compensatory strategies to facilitate improved word retrieval skills (to be achieved in 4-6 weeks)  -- PARTIALLY MET    4  Patient will complete concrete and abstract categorization tasks to 80% accuracy to facilitate improved generative naming skills and working memory (to be achieved in 4-6 weeks)  -- PARTIALLY MET    5  Patient will complete word generation tasks (e g , analogies, category matrices, synonyms/antonyms, etc ) with 80% accuracy using word finding strategies to facilitate improved word retrieval skills (to be achieved in 4-6 weeks)  -- PARTIALLY MET  -Word deduction: Patient was provided with definition clues and asked to name what rhyming pair of words was being described (e g , a chubby feline = FAT CAT)  Task completed in  opp (67% acc) independently, increasing to  opp (100% acc) with min semantic cues   Rest of worksheet presented as HEP     6  Patient will name up to 5 features to describe a person/place/thing with 80% accuracy to facilitate improved utilization of circumlocution strategy (to be achieved in 4-6 weeks)  -- NOT ADDRESSED  -Higher-level verbal expression: Patient was presented with an intangible word list (e g , beauty, compassion, brutality, etc ) and asked to describe the target words to clinician to practice word finding/circumlocution  Patient provided appropriate clues in 5/7 opp (72% acc) provided additional processing time  Patient had trouble describing the remaining 14 words independently--provided mod-max verbal cues from clinician, patient completed the rest of descriptive task  Patient guessed clue words presented by clinician in 3/6 opp (50% acc) independently, increasing to 6/6 opp (100% acc) with mod semantic and phonemic cues  Discussed strategies to assist with word finding abilities in daily life  Plan:  -Patient was provided with home exercises/activities to target goals in plan of care at the end of today's session   -Continue with current plan of care

## 2021-06-28 ENCOUNTER — OFFICE VISIT (OUTPATIENT)
Dept: SPEECH THERAPY | Facility: CLINIC | Age: 76
End: 2021-06-28
Payer: MEDICARE

## 2021-06-28 DIAGNOSIS — Z86.73 HISTORY OF STROKE: ICD-10-CM

## 2021-06-28 DIAGNOSIS — R47.01 APHASIA: Primary | ICD-10-CM

## 2021-06-28 PROCEDURE — 92507 TX SP LANG VOICE COMM INDIV: CPT | Performed by: SPEECH-LANGUAGE PATHOLOGIST

## 2021-07-01 ENCOUNTER — OFFICE VISIT (OUTPATIENT)
Dept: SPEECH THERAPY | Facility: CLINIC | Age: 76
End: 2021-07-01
Payer: MEDICARE

## 2021-07-01 DIAGNOSIS — R47.01 APHASIA: Primary | ICD-10-CM

## 2021-07-01 DIAGNOSIS — Z86.73 HISTORY OF STROKE: ICD-10-CM

## 2021-07-01 PROCEDURE — 92507 TX SP LANG VOICE COMM INDIV: CPT | Performed by: SPEECH-LANGUAGE PATHOLOGIST

## 2021-07-01 NOTE — PROGRESS NOTES
Daily Speech Treatment Note    Today's date: 2021   Patients name: Antoine Wright  : 1945  MRN: [id number]  Safety measures: h/o CVA, HTN, COPD, aphasia  Referring provider: Kirill Dacosta DO    Primary diagnosis/billing code: R47 01  Secondary diagnosis/billing code: [id number]    Visit tracking:  -Referring provider: Epic  -Billing guidelines: CMS  -Visit #9/10 (total visits: 15)   -Insurance: R [place] [address] due 07/10/2021    Subjective/Behavioral:  -Patient engaged well in all activities during session today  Objective/Assessment:  -Reviewed patient's home exercises/activities completed since last appointment  Intangible word list--patient stated that he had great difficulty with semantic descriptor task for HEP with wife  Rhyming word pairs completed in 3/8 opp  Short-term goals:  2  Patient will name an average of 15+ items in a category in 60 seconds over 5 trials using compensatory strategies to facilitate improved word retrieval skills (to be achieved in 4-6 weeks)  -- PARTIALLY MET    3  Patient will name an average of 10+ words that begin with a specific letter in 60 seconds over 5 trials using compensatory strategies to facilitate improved word retrieval skills (to be achieved in 4-6 weeks)  -- PARTIALLY MET    4  Patient will complete concrete and abstract categorization tasks to 80% accuracy to facilitate improved generative naming skills and working memory (to be achieved in 4-6 weeks)  -- PARTIALLY MET    5  Patient will complete word generation tasks (e g , analogies, category matrices, synonyms/antonyms, etc ) with 80% accuracy using word finding strategies to facilitate improved word retrieval skills (to be achieved in 4-6 weeks)  -- PARTIALLY MET  -Reasoning/working memory/thought organization (anagrams): Patient was presented with a target word and asked to rearrange letters to create another word (e g , late = TALE ; item = TIME)   Initially, no visual cue was provided, and patient was not permitted to write word on paper  Task completed in 27/31 opp (87% acc), increasing to 31/31 opp (00% acc) with the utilization of written support  6  Patient will name up to 5 features to describe a person/place/thing with 80% accuracy to facilitate improved utilization of circumlocution strategy (to be achieved in 4-6 weeks)  -- NOT ADDRESSED  -Organization/sentence construction: Patient was presented with 4 words and asked to form a sentence using the words  Task completed in 18/20 opp (90% acc) independently with additional processing time and frequent self-corrections, increasing to 20/20 opp (100% acc) with mod verbal cues  Clinician wrote sentences for patient so that he could focus on verbal expression part of this task  Patient noted to drop articles and prepositions during exercise, as well as have phonemic paraphasias  Plan:  -Patient was provided with home exercises/activities to target goals in plan of care at the end of today's session   -Continue with current plan of care

## 2021-07-02 RX ORDER — BISOPROLOL FUMARATE 5 MG/1
2.5 TABLET ORAL 2 TIMES DAILY
Qty: 90 TABLET | Refills: 3 | Status: SHIPPED | OUTPATIENT
Start: 2021-07-02 | End: 2022-03-24

## 2021-07-06 ENCOUNTER — OFFICE VISIT (OUTPATIENT)
Dept: SPEECH THERAPY | Facility: CLINIC | Age: 76
End: 2021-07-06
Payer: MEDICARE

## 2021-07-06 DIAGNOSIS — Z86.73 HISTORY OF STROKE: ICD-10-CM

## 2021-07-06 DIAGNOSIS — R47.01 APHASIA: Primary | ICD-10-CM

## 2021-07-06 PROCEDURE — 92507 TX SP LANG VOICE COMM INDIV: CPT | Performed by: SPEECH-LANGUAGE PATHOLOGIST

## 2021-07-06 NOTE — PROGRESS NOTES
Daily Speech Treatment Note    Today's date: 7/15/2021  Patients name: Greta Pantoja  : 1945  MRN: [id number]  Safety measures: h/o CVA, HTN, COPD, aphasia  Referring provider: Markus Bañuelos DO    Primary diagnosis/billing code: R47 01  Secondary diagnosis/billing code: [id number]    Visit tracking:  -Referring provider: Epic  -Billing guidelines: CMS  -Visit #2/10 (total visits: 17)   -Insurance: R [place] [address] due 2021    Subjective/Behavioral:  -Patient actively participated in all activities during today's session  Objective/Assessment:  -Reviewed patient's home exercises/activities completed since last appointment  Analogies worksheets completed  Third and fourth- opp  First and third- opp  Short-term goals:  2  Patient will name an average of 15+ items in a category in 60 seconds over 5 trials using compensatory strategies to facilitate improved word retrieval skills (to be achieved in 4-6 weeks)  -- PARTIALLY MET     3  Patient will name an average of 10+ words that begin with a specific letter in 60 seconds over 5 trials using compensatory strategies to facilitate improved word retrieval skills (to be achieved in 4-6 weeks)  -- PARTIALLY MET     4  Patient will complete concrete and abstract categorization tasks to 80% accuracy to facilitate improved generative naming skills and working memory (to be achieved in 4-6 weeks)  -- PARTIALLY MET     5  Patient will complete word generation tasks (e g , analogies, category matrices, synonyms/antonyms, etc ) with 80% accuracy using word finding strategies to facilitate improved word retrieval skills (to be achieved in 4-6 weeks)  -- PARTIALLY MET     6  Patient will name up to 5 features to describe a person/place/thing with 80% accuracy to facilitate improved utilization of circumlocution strategy (to be achieved in 4-6 weeks)  -- PARTIALLY MET  -Open-ended speech tasks:  The following exercises were utilized to target

## 2021-07-06 NOTE — PROGRESS NOTES
Speech-Language Pathology Re-Evaluation    Today's date: 2021  Patients name: John Zuniga  : 1945  MRN: [id number]  Safety measures: h/o CVA, HTN, COPD, aphasia,   Referring provider: Bouchra Robles DO    Primary diagnosis/billing code: R47 01  Secondary diagnosis/billing code: [id number]    Visit tracking:  -Referring provider: Epic  -Billing guidelines: CMS  -Visit #1/10 (total visits: 16)  -Insurance: R [place] [address] [address] due 2021    Subjective comments: Patient engaged well in all activities during session today  Patient's goal(s): "to think"    Assessments    The Western Aphasia Battery-Revised (WAB-R) is designed to evaluate a patient's language function following CVA, dementia, or other acquired neurological disorder  It measures a patients linguistic skills, such as speech content, fluency, auditory comprehension, repetition, naming, reading, and writing  The WAB-R also measures a patients nonlinguistic skills, including drawing, calculation, block design, and apraxia  The purpose of this standardized assessment is to (1) determine the presence, severity, and type of aphasia; (2) measure the patient's level of performance to provide a baseline for detecting change over time; (3) provide a comprehensive assessment of the patient's language assets and deficits in order to guide treatment and management; and (4) infer the location and etiology of the lesion causing aphasia  The following results were obtained during the administration of the assessment:     PART 1: Score: IE: Status:   -Spontaneous Speech:  18 IMPROVEMENT   -Auditory Verbal Comprehension: /10 9 6 IMPROVEMENT   -Repetition: 9/10 8 6 IMPROVEMENT   -Naming & Word Findin/10 8 9 IMPROVEMENT     *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  Due to time constraints, only portions of the standardized assessment were administered on this date of service      PART 2: Score: IE: Status:   -Reading Score: 16  19 6 DECLINE   -Writin  19 6 IMPROVEMENT   -Apraxia: Not tested 10 N/A   -Constructional, Visuospatial, & Calculation: Not tested 7 5 N/A        Score: IE: Status:   *Aphasia Quotient (AQ): 94 7/100 90 2 IMPROVEMENT   *Language Quotient (LQ): 93 9/100 93 9 NO CHANGE   *Cortical Quotient (CQ): Not tested 91 8 N/A     IE indicates the scores from the initial evaluation (06/10/2021)      HEP Review:  -Word deduction: Patient was provided with definition clues and asked to name what rhyming pair of words was being described (e g , a chubby feline = FAT CAT)  Task completed in  opp (70% acc) independently, increasing to  opp (100% acc) with min phonemic cues and additional processing time      -Organization/sentence construction: Patient was presented with 4 words and asked to form a sentence using the words  Task completed across 5 trials  It should be noted that patient demonstrated mild errors with grammatical construction (missing end verb tenses, incorrect form of verb)  with additional processing time and frequent self-corrections, increasing to  opp (100% acc) with mod verbal cues  Clinician wrote sentences for patient so that he could focus on verbal expression part of this task  Patient noted to drop articles and prepositions during exercise, as well as have phonemic paraphasias  Goals    Short-term goals:  1  Patient will be educated on word finding strategies (i e , circumlocution) for improved generative naming and verbal expression skills (to be achieved in 1-2 weeks)  -- MET    2  Patient will name an average of 15+ items in a category in 60 seconds over 5 trials using compensatory strategies to facilitate improved word retrieval skills (to be achieved in 4-6 weeks)  -- PARTIALLY MET    3   Patient will name an average of 10+ words that begin with a specific letter in 60 seconds over 5 trials using compensatory strategies to facilitate improved word retrieval skills (to be achieved in 4-6 weeks)  -- PARTIALLY MET    4  Patient will complete concrete and abstract categorization tasks to 80% accuracy to facilitate improved generative naming skills and working memory (to be achieved in 4-6 weeks)  -- PARTIALLY MET    5  Patient will complete word generation tasks (e g , analogies, category matrices, synonyms/antonyms, etc ) with 80% accuracy using word finding strategies to facilitate improved word retrieval skills (to be achieved in 4-6 weeks)  -- PARTIALLY MET    6  Patient will name up to 5 features to describe a person/place/thing with 80% accuracy to facilitate improved utilization of circumlocution strategy (to be achieved in 4-6 weeks)  -- PARTIALLY MET     Long-term goals:  1  Patient will improve ability to facilitate communication to meet needs including use of compensatory strategies to promote meaningful interactions for improved quality of life and maximize level of independence (to be achieved by discharge)  -- PARTIALLY MET    2  Patient will demonstrate adequate verbal expression during conversation without minimal breakdowns or word finding deficits (to be achieved by discharge)  -- PARTIALLY MET    Impressions/Recommendations    Impressions:   -Patient demonstrated improvements in his Aphasia Quotient since the initial evaluation on 05/10/2021 (94 7 vs  90 2)  Specifically, patient improved in the areas of Spontaneous Speech, Auditory Verbal Comprehension, Repetition, and Naming/Word Finding  Overall, clinician's analysis of patient's speech-language skills revealed that his overall MLU has increased in informal conversation and simple picture description tasks, as well as his verbal fluency and repetition skills  Patient continues to present with moderate expressive aphasia s/p CVA c/b reduced content of connected speech, halting/slow rate of speech/processing, and phonemic paraphasias   Patient reported that he continues to struggle with conversational speaking, as he expressed he knows what he wishes to say, but he has trouble "getting it out"  Clinician suspects that reduced organization and anomia play a role in this difficulty  Patient has been consistent with attendance in [address] [place] [address] Dr, attempts all assigned HEP worksheets, and is motivated during all sessions  Patient would benefit from continued outpatient skilled Speech Therapy services to support positive communication interactions with both familiar and unfamiliar listeners, communicate his basic wants/needs, follow directions within functional activities, participate in meaningful activities, allow for increased socialization, promote safety, and overall QOL, as well as education on independent HEP      Recommendations:  -Patient would benefit from outpatient skilled Speech Therapy services: Speech/ language therapy    -Frequency: 2x weekly  -Duration: 4-6 weeks    -Intervention certification from: 9/66/3326  -Intervention certification to: 06/16/3634    -Intervention comments:   Re-evaluation/administration of standardized test with patient (45 minutes)  Scoring and interpretation of standardized test for the development of POC (45 minutes)  HEP review and discussion on POC/goals (15 minutes)

## 2021-07-08 ENCOUNTER — OFFICE VISIT (OUTPATIENT)
Dept: SPEECH THERAPY | Facility: CLINIC | Age: 76
End: 2021-07-08
Payer: MEDICARE

## 2021-07-08 DIAGNOSIS — Z86.73 HISTORY OF STROKE: ICD-10-CM

## 2021-07-08 DIAGNOSIS — R47.01 APHASIA: Primary | ICD-10-CM

## 2021-07-08 PROCEDURE — 92507 TX SP LANG VOICE COMM INDIV: CPT | Performed by: SPEECH-LANGUAGE PATHOLOGIST

## 2021-07-12 ENCOUNTER — EVALUATION (OUTPATIENT)
Dept: SPEECH THERAPY | Facility: CLINIC | Age: 76
End: 2021-07-12
Payer: MEDICARE

## 2021-07-12 DIAGNOSIS — Z86.73 HISTORY OF STROKE: ICD-10-CM

## 2021-07-12 DIAGNOSIS — R47.01 APHASIA: Primary | ICD-10-CM

## 2021-07-12 PROCEDURE — 96105 ASSESSMENT OF APHASIA: CPT | Performed by: SPEECH-LANGUAGE PATHOLOGIST

## 2021-07-12 PROCEDURE — 92507 TX SP LANG VOICE COMM INDIV: CPT | Performed by: SPEECH-LANGUAGE PATHOLOGIST

## 2021-07-13 ENCOUNTER — OFFICE VISIT (OUTPATIENT)
Dept: CARDIOLOGY CLINIC | Facility: CLINIC | Age: 76
End: 2021-07-13
Payer: MEDICARE

## 2021-07-13 VITALS
SYSTOLIC BLOOD PRESSURE: 110 MMHG | WEIGHT: 161 LBS | DIASTOLIC BLOOD PRESSURE: 58 MMHG | BODY MASS INDEX: 24.4 KG/M2 | OXYGEN SATURATION: 98 % | HEART RATE: 61 BPM | HEIGHT: 68 IN

## 2021-07-13 DIAGNOSIS — J44.9 COPD, SEVERE (HCC): ICD-10-CM

## 2021-07-13 DIAGNOSIS — I42.8 NICM (NONISCHEMIC CARDIOMYOPATHY) (HCC): ICD-10-CM

## 2021-07-13 DIAGNOSIS — I42.9 CARDIOMYOPATHY, UNSPECIFIED TYPE (HCC): ICD-10-CM

## 2021-07-13 DIAGNOSIS — I10 ESSENTIAL HYPERTENSION: Primary | ICD-10-CM

## 2021-07-13 PROCEDURE — 99214 OFFICE O/P EST MOD 30 MIN: CPT | Performed by: INTERNAL MEDICINE

## 2021-07-13 PROCEDURE — 93000 ELECTROCARDIOGRAM COMPLETE: CPT | Performed by: INTERNAL MEDICINE

## 2021-07-13 RX ORDER — FUROSEMIDE 20 MG/1
TABLET ORAL
Qty: 270 TABLET | Refills: 3 | Status: SHIPPED | OUTPATIENT
Start: 2021-07-13 | End: 2022-03-24

## 2021-07-13 NOTE — PATIENT INSTRUCTIONS
Recommendations:  1  Continue current medications  2  Check echo to evaluate improvement in LV systolic function     3  Follow up in 6 months

## 2021-07-15 ENCOUNTER — OFFICE VISIT (OUTPATIENT)
Dept: SPEECH THERAPY | Facility: CLINIC | Age: 76
End: 2021-07-15
Payer: MEDICARE

## 2021-07-15 DIAGNOSIS — R47.01 APHASIA: Primary | ICD-10-CM

## 2021-07-15 DIAGNOSIS — Z86.73 HISTORY OF STROKE: ICD-10-CM

## 2021-07-15 PROCEDURE — 92507 TX SP LANG VOICE COMM INDIV: CPT | Performed by: SPEECH-LANGUAGE PATHOLOGIST

## 2021-07-15 NOTE — PROGRESS NOTES
Daily Speech Treatment Note    Today's date: 2021   Patients name: Eugenie Garcia  : 1945  MRN: [id number]  Safety measures: h/o CVA, HTN, COPD, aphasia  Referring provider: Mojgan Grove DO    Primary diagnosis/billing code: R47 01  Secondary diagnosis/billing code: [id number]    Visit tracking:  -Referring provider: Epic  -Billing guidelines: CMS  -Visit #4/10 (total visits: 19)   -Insurance: R [address] due 2021    Subjective/Behavioral:  -Patient actively participated in all activities during today's session  Objective/Assessment:  -Reviewed patient's home exercises/activities completed since last appointment  6 Word Xena Prabhakar" completed in  opp--some items had too many words, others didn't have enough  Short-term goals:  2  Patient will name an average of 15+ items in a category in 60 seconds over 5 trials using compensatory strategies to facilitate improved word retrieval skills (to be achieved in 4-6 weeks)  -- PARTIALLY MET     3  Patient will name an average of 10+ words that begin with a specific letter in 60 seconds over 5 trials using compensatory strategies to facilitate improved word retrieval skills (to be achieved in 4-6 weeks)  -- PARTIALLY MET     4  Patient will complete concrete and abstract categorization tasks to 80% accuracy to facilitate improved generative naming skills and working memory (to be achieved in 4-6 weeks)  -- PARTIALLY MET     5  Patient will complete word generation tasks (e g , analogies, category matrices, synonyms/antonyms, etc ) with 80% accuracy using word finding strategies to facilitate improved word retrieval skills (to be achieved in 4-6 weeks)  -- PARTIALLY MET     6  Patient will name up to 5 features to describe a person/place/thing with 80% accuracy to facilitate improved utilization of circumlocution strategy (to be achieved in 4-6 weeks)  -- PARTIALLY MET  -Patient brought along a newspaper clipping to today's appointment  Clinician and patient spent time at the beginning of today's session using it to elicit conversation  Patient benefited from having visual to aid in topic choice and word retrieval/organization       -Open-ended speech tasks: The following exercises were utilized to target connected speech  Patient benefited from additional processing time for word retrieval/language organization  *First response (explaining the first thing to do in each situation): MLU: 3 5 words    *Alternate action (explaining alternate ways to solve a problem):  MLU: 2 73 words    *Additional information (determing what additional information is required considering a situation): MLU: 3 07 words    Plan:  -Patient was provided with home exercises/activities to target goals in plan of care at the end of today's session   -Continue with current plan of care

## 2021-07-15 NOTE — PROGRESS NOTES
Daily Speech Treatment Note    Today's date: 2021  Patients name: Fabio Alan  : 1945  MRN: [id number]  Safety measures: h/o CVA, HTN, COPD, aphasia  Referring provider: Vincent Robert DO    Primary diagnosis/billing code: R47 01  Secondary diagnosis/billing code: [id number]    Visit tracking:  -Referring provider: Epic  -Billing guidelines: CMS  -Visit #3/10 (total visits: 18)   -Insurance: R [address] due 2021    Subjective/Behavioral:  -Patient was in good spirits during today's session  Objective/Assessment:  -Reviewed patient's home exercises/activities completed since last appointment  General Information (Brand Names) completed in  opp  Completing Words (IT and MAN) completed in  opp  Short-term goals:  2  Patient will name an average of 15+ items in a category in 60 seconds over 5 trials using compensatory strategies to facilitate improved word retrieval skills (to be achieved in 4-6 weeks)  -- PARTIALLY MET     3  Patient will name an average of 10+ words that begin with a specific letter in 60 seconds over 5 trials using compensatory strategies to facilitate improved word retrieval skills (to be achieved in 4-6 weeks)  -- PARTIALLY MET     4  Patient will complete concrete and abstract categorization tasks to 80% accuracy to facilitate improved generative naming skills and working memory (to be achieved in 4-6 weeks)  -- PARTIALLY MET     5  Patient will complete word generation tasks (e g , analogies, category matrices, synonyms/antonyms, etc ) with 80% accuracy using word finding strategies to facilitate improved word retrieval skills (to be achieved in 4-6 weeks)  -- PARTIALLY MET  -Word deduction: Patient was provided with a definition and asked to name what was being described   It should be noted that patient was provided with letters (ON and IN) and fill-in-the-blank spaces to assist with word retrieval  Task completed in  opp (68% acc) independently, increasing to 34/40 opp (85% acc) with min semantic cues, increasing to 40/40 opp (100% acc) with additional mod phonemic cues       6  Patient will name up to 5 features to describe a person/place/thing with 80% accuracy to facilitate improved utilization of circumlocution strategy (to be achieved in 4-6 weeks)  -- PARTIALLY MET  -Patient brought along family photos along to today's appointment  Clinician and patient spent time at the beginning of today's session using the photos to elicit conversation  Patient benefited from having visuals to aid in topic choice and word retrieval/organization     -Word finding/semantic language: Patient participated in education on HEP (6 Word Memoir)  Patient was instructed to utilize only 6 words to describe a person, place, or thing  Patient was provided with a detailed explanation of task prior to activity and was presented with 2 examples, as well as 2 trials with min cues from clinician  3 cards were presented as HEP  Plan:  -Patient was provided with home exercises/activities to target goals in plan of care at the end of today's session   -Continue with current plan of care

## 2021-07-19 ENCOUNTER — OFFICE VISIT (OUTPATIENT)
Dept: SPEECH THERAPY | Facility: CLINIC | Age: 76
End: 2021-07-19
Payer: MEDICARE

## 2021-07-19 DIAGNOSIS — R47.01 APHASIA: Primary | ICD-10-CM

## 2021-07-19 DIAGNOSIS — Z86.73 HISTORY OF STROKE: ICD-10-CM

## 2021-07-19 PROCEDURE — 92507 TX SP LANG VOICE COMM INDIV: CPT | Performed by: SPEECH-LANGUAGE PATHOLOGIST

## 2021-07-19 NOTE — PROGRESS NOTES
Daily Speech Treatment Note    Today's date: 2021  Patients name: Emi Card  : 1945  MRN: [id number]  Safety measures: h/o CVA, HTN, COPD, aphasia  Referring provider: Darlyn Walls DO    Primary diagnosis/billing code: R47 01  Secondary diagnosis/billing code: [id number]    Visit tracking:  -Referring provider: Epic  -Billing guidelines: CMS  -Visit #5/10 (total visits: 20)   -Insurance: R [address] due 2021    Subjective/Behavioral:  -Patient was in good spirits during today's session  Objective/Assessment:  -Reviewed patient's home exercises/activities completed since last appointment  Short-term goals:  2  Patient will name an average of 15+ items in a category in 60 seconds over 5 trials using compensatory strategies to facilitate improved word retrieval skills (to be achieved in 4-6 weeks)  -- PARTIALLY MET     3  Patient will name an average of 10+ words that begin with a specific letter in 60 seconds over 5 trials using compensatory strategies to facilitate improved word retrieval skills (to be achieved in 4-6 weeks)  -- PARTIALLY MET     4  Patient will complete concrete and abstract categorization tasks to 80% accuracy to facilitate improved generative naming skills and working memory (to be achieved in 4-6 weeks)  -- PARTIALLY MET  -Abstract categorization: Patient was provided with 3 words belonging to the same category  Patient named category label in  opp (76% acc) independently, increasing to  opp (100% acc) with min semantic cues  Patient named an additional category member in  opp (100% acc) independently      5  Patient will complete word generation tasks (e g , analogies, category matrices, synonyms/antonyms, etc ) with 80% accuracy using word finding strategies to facilitate improved word retrieval skills (to be achieved in 4-6 weeks)   -- PARTIALLY MET     6  Patient will name up to 5 features to describe a person/place/thing with 80% accuracy to facilitate improved utilization of circumlocution strategy (to be achieved in 4-6 weeks)  -- PARTIALLY MET  -Patient engaged in a great amount of informal conversation during today's session  Patient was noted to initiate a variety of topics, including TV shows and cars  Clinician utilized this time to practice and implement cues with patient to support successful conversation  Plan:  -Patient was provided with home exercises/activities to target goals in plan of care at the end of today's session   -Continue with current plan of care

## 2021-07-19 NOTE — PROGRESS NOTES
Daily Speech Treatment Note    Today's date: 2021  Patients name: Nam Klein  : 1945  MRN: [id number]  Safety measures: h/o CVA, HTN, COPD, aphasia  Referring provider: Deshawn August DO    Primary diagnosis/billing code: R47 01  Secondary diagnosis/billing code: [id number]    Visit tracking:  -Referring provider: Epic  -Billing guidelines: CMS  -Visit #6/10 (total visits: 21)   -Insurance: R [address] due 2021    Subjective/Behavioral:  -Patient actively participated in all activities during today's session  Objective/Assessment:  -Reviewed patient's home exercises/activities completed since last appointment  Unscrambling words to form sentences (6-11 words) completed with 100% acc  Short-term goals:  2  Patient will name an average of 15+ items in a category in 60 seconds over 5 trials using compensatory strategies to facilitate improved word retrieval skills (to be achieved in 4-6 weeks)  -- PARTIALLY MET     3  Patient will name an average of 10+ words that begin with a specific letter in 60 seconds over 5 trials using compensatory strategies to facilitate improved word retrieval skills (to be achieved in 4-6 weeks)  -- PARTIALLY MET     4  Patient will complete concrete and abstract categorization tasks to 80% accuracy to facilitate improved generative naming skills and working memory (to be achieved in 4-6 weeks)  -- PARTIALLY MET  -Generative naming: Patient instructed to name 4 words for a given category  Task completed in 8/8 opp (100% acc) independently  -Generative naming: Patient instructed to name 8 words for a given category  Task completed in 2/3 opp (67% acc) independently, increasing to 3/3 opp (100% acc) with min semantic cues       5   Patient will complete word generation tasks (e g , analogies, category matrices, synonyms/antonyms, etc ) with 80% accuracy using word finding strategies to facilitate improved word retrieval skills (to be achieved in 4-6 weeks)  -- PARTIALLY MET  -Word deduction: Patient was provided with a definition and asked to name what was being described  It should be noted that patient was provided with letters (RE and MON) and fill-in-the-blank spaces to assist with word retrieval  Task completed in 34/40 opp (85% acc) independently, increasing to 40/40 opp (100% acc) with min semantic and phonemic cues  6  Patient will name up to 5 features to describe a person/place/thing with 80% accuracy to facilitate improved utilization of circumlocution strategy (to be achieved in 4-6 weeks)  -- PARTIALLY MET  -Patient engaged in conversation re: famous singers using autographed photos brought along from home during today's session  Patient was noted to initiate conversation using these external supports  Clinician utilized this time to practice and implement cues with patient to support successful conversation  Plan:  -Patient was provided with home exercises/activities to target goals in plan of care at the end of today's session   -Continue with current plan of care

## 2021-07-22 ENCOUNTER — OFFICE VISIT (OUTPATIENT)
Dept: SPEECH THERAPY | Facility: CLINIC | Age: 76
End: 2021-07-22
Payer: MEDICARE

## 2021-07-22 DIAGNOSIS — Z86.73 HISTORY OF STROKE: ICD-10-CM

## 2021-07-22 DIAGNOSIS — R47.01 APHASIA: Primary | ICD-10-CM

## 2021-07-22 PROCEDURE — 92507 TX SP LANG VOICE COMM INDIV: CPT | Performed by: SPEECH-LANGUAGE PATHOLOGIST

## 2021-07-26 ENCOUNTER — OFFICE VISIT (OUTPATIENT)
Dept: SPEECH THERAPY | Facility: CLINIC | Age: 76
End: 2021-07-26
Payer: MEDICARE

## 2021-07-26 DIAGNOSIS — Z86.73 HISTORY OF STROKE: ICD-10-CM

## 2021-07-26 DIAGNOSIS — R47.01 APHASIA: Primary | ICD-10-CM

## 2021-07-26 PROCEDURE — 92507 TX SP LANG VOICE COMM INDIV: CPT | Performed by: SPEECH-LANGUAGE PATHOLOGIST

## 2021-07-26 NOTE — PROGRESS NOTES
Daily Speech Treatment Note    Today's date: 2021   Patients name: Michael Chiu  : 1945  MRN: [id number]  Safety measures: h/o CVA, HTN, COPD, aphasia  Referring provider: Becky Handley DO    Primary diagnosis/billing code: R47 01  Secondary diagnosis/billing code: [id number]    Visit tracking:  -Referring provider: Epic  -Billing guidelines: CMS  -Visit #8/10 (total visits: 23)   -Insurance: R [address] due 2021    Subjective/Behavioral:  -Patient was alert and pleasant during todays session  Objective/Assessment:  -Reviewed patient's home exercises/activities completed since last appointment  Unscrambling words completed with 100% acc  Word deduction task completed with 100% acc  Short-term goals:  2  Patient will name an average of 15+ items in a category in 60 seconds over 5 trials using compensatory strategies to facilitate improved word retrieval skills (to be achieved in 4-6 weeks)  -- PARTIALLY MET     3  Patient will name an average of 10+ words that begin with a specific letter in 60 seconds over 5 trials using compensatory strategies to facilitate improved word retrieval skills (to be achieved in 4-6 weeks)  -- PARTIALLY MET     4  Patient will complete concrete and abstract categorization tasks to 80% accuracy to facilitate improved generative naming skills and working memory (to be achieved in 4-6 weeks)  -- PARTIALLY MET     5  Patient will complete word generation tasks (e g , analogies, category matrices, synonyms/antonyms, etc ) with 80% accuracy using word finding strategies to facilitate improved word retrieval skills (to be achieved in 4-6 weeks)  -- PARTIALLY MET     6  Patient will name up to 5 features to describe a person/place/thing with 80% accuracy to facilitate improved utilization of circumlocution strategy (to be achieved in 4-6 weeks)   -- PARTIALLY MET  -To target word retrieval/language organization, patient was instructed to ask 20 yes/no questions in an attempt to guess clinician's set of target words  Patient noted to have difficulty with independent completion of task due to anomia  Patient benefited from mod-max visual cues to increase production of appropriate questions  Task completed across 8 trials  Patient also responded to clinician's yes/no questions to guess his target words across 7 trials  Plan:  -Patient was provided with home exercises/activities to target goals in plan of care at the end of today's session   -Continue with current plan of care

## 2021-07-26 NOTE — PROGRESS NOTES
Daily Speech Treatment Note    Today's date: 2021  Patients name: Antoine Wright  : 1945  MRN: [id number]  Safety measures: h/o CVA, HTN, COPD, aphasia  Referring provider: Kirill Dacosta DO    Primary diagnosis/billing code: R47 01  Secondary diagnosis/billing code: [id number]    Visit tracking:  -Referring provider: Epic  -Billing guidelines: CMS  -Visit #7/10 (total visits: 22)   -Insurance: R [place] [address] due 2021    Subjective/Behavioral:  -"It is too early to think  Objective/Assessment:  -Reviewed patient's home exercises/activities completed since last appointment  Patient completed word scrambles with 100% accuracy  Patient completed category naming with 100% accuracy  Short-term goals:  2  Patient will name an average of 15+ items in a category in 60 seconds over 5 trials using compensatory strategies to facilitate improved word retrieval skills (to be achieved in 4-6 weeks)  -- PARTIALLY MET     3  Patient will name an average of 10+ words that begin with a specific letter in 60 seconds over 5 trials using compensatory strategies to facilitate improved word retrieval skills (to be achieved in 4-6 weeks)  -- PARTIALLY MET     4  Patient will complete concrete and abstract categorization tasks to 80% accuracy to facilitate improved generative naming skills and working memory (to be achieved in 4-6 weeks)  -- PARTIALLY MET    To target word generation: Patient was verbally presented with a list of three words and was instructed to state what the category is and add an additional item  Patient named category in 15/20 (75%) opportunities independently, increasing to 20/20 (20%) opportunities given semantic cues  Patient added an additional item to the list in 19/20 (95%) opportunities independently, increasing to 20/20 (100%) opportunities given semantic cues      5  Patient will complete word generation tasks (e g , analogies, category matrices, synonyms/antonyms, etc ) with 80% accuracy using word finding strategies to facilitate improved word retrieval skills (to be achieved in 4-6 weeks)  -- PARTIALLY MET    To target word generation: Patient completed analogy task where he was given the first half and was instructed to fill in the second half (e g  Philip Marshall is to mammal as ____ is to ____)  Patient completed this task in 15/20 (75%) opportunities independently, increasing to 20/20 (100%) opportunities given mod semantic cues  It should be noted that Patient initially had trouble with this task, but once he understood the concept, he was able to answer questions independently      6  Patient will name up to 5 features to describe a person/place/thing with 80% accuracy to facilitate improved utilization of circumlocution strategy (to be achieved in 4-6 weeks)  -- PARTIALLY MET    To target circumlocution: Patient described common nouns/adjectives/verbs to the Clinician using full sentences  Clinician guessed 20/20 words described  Plan:  -Patient was provided with home exercises/activities to target goals in plan of care at the end of today's session   -Continue with current plan of care

## 2021-07-29 ENCOUNTER — OFFICE VISIT (OUTPATIENT)
Dept: SPEECH THERAPY | Facility: CLINIC | Age: 76
End: 2021-07-29
Payer: MEDICARE

## 2021-07-29 DIAGNOSIS — R47.01 APHASIA: Primary | ICD-10-CM

## 2021-07-29 DIAGNOSIS — Z86.73 HISTORY OF STROKE: ICD-10-CM

## 2021-07-29 PROCEDURE — 92507 TX SP LANG VOICE COMM INDIV: CPT | Performed by: SPEECH-LANGUAGE PATHOLOGIST

## 2021-08-02 ENCOUNTER — OFFICE VISIT (OUTPATIENT)
Dept: SPEECH THERAPY | Facility: CLINIC | Age: 76
End: 2021-08-02
Payer: MEDICARE

## 2021-08-02 DIAGNOSIS — R47.01 APHASIA: Primary | ICD-10-CM

## 2021-08-02 DIAGNOSIS — Z86.73 HISTORY OF STROKE: ICD-10-CM

## 2021-08-02 PROCEDURE — 92507 TX SP LANG VOICE COMM INDIV: CPT

## 2021-08-02 NOTE — PROGRESS NOTES
Daily Speech Treatment Note    Today's date: 2021  Patients name: Pallavi Sims  : 1945  MRN: [id number]  Safety measures: h/o CVA, HTN, COPD, aphasia  Referring provider: Willam Sanders DO    Primary diagnosis/billing code: R47 01  Secondary diagnosis/billing code: [id number]    Visit tracking:  -Referring provider: Epic  -Billing guidelines: CMS  -Visit #9/10 (total visits: 24)   -Insurance: R [place] [address] due 2021    Subjective/Behavioral:  -Patient arrived timely to his appointment and actively participated in all therapy activities  Objective/Assessment:  -Reviewed patient's home exercises/activities completed since last appointment  Acrostics puzzle completed with 100% acc  Seek-Out-Words completed in  opp  Short-term goals:  2  Patient will name an average of 15+ items in a category in 60 seconds over 5 trials using compensatory strategies to facilitate improved word retrieval skills (to be achieved in 4-6 weeks)  -- PARTIALLY MET     3  Patient will name an average of 10+ words that begin with a specific letter in 60 seconds over 5 trials using compensatory strategies to facilitate improved word retrieval skills (to be achieved in 4-6 weeks)  -- PARTIALLY MET     4  Patient will complete concrete and abstract categorization tasks to 80% accuracy to facilitate improved generative naming skills and working memory (to be achieved in 4-6 weeks)  -- PARTIALLY MET     5  Patient will complete word generation tasks (e g , analogies, category matrices, synonyms/antonyms, etc ) with 80% accuracy using word finding strategies to facilitate improved word retrieval skills (to be achieved in 4-6 weeks)  -- PARTIALLY MET  -Sentence construction/word finding/organization: Patient was presented with a target word with one letter missing  Patient filled-in the missing letter in  opp (100% acc) independently  Patient was then asked to construct a sentence using each target word   Task completed in 4/5 opp (80% acc) independently, increasing to 5/5 opp (100% acc) with min verbal cue      6  Patient will name up to 5 features to describe a person/place/thing with 80% accuracy to facilitate improved utilization of circumlocution strategy (to be achieved in 4-6 weeks)  -- PARTIALLY MET  -Higher-level verbal expression: Patient was presented with an intangible word list (e g , beauty, compassion, brutality, etc ) and asked to describe the target words to clinician to practice word finding/circumlocution  Patient provided appropriate clues across 15 trials provided additional time for language processing/execution of clues--skipped 5 target words  Patient guessed clue words presented by clinician in 19/21 opp  Discussed strategies to assist with word finding abilities in daily life  Plan:  -Patient was provided with home exercises/activities to target goals in plan of care at the end of today's session   -Continue with current plan of care

## 2021-08-05 ENCOUNTER — OFFICE VISIT (OUTPATIENT)
Dept: SPEECH THERAPY | Facility: CLINIC | Age: 76
End: 2021-08-05
Payer: MEDICARE

## 2021-08-05 DIAGNOSIS — R47.01 APHASIA: Primary | ICD-10-CM

## 2021-08-05 DIAGNOSIS — Z86.73 HISTORY OF STROKE: ICD-10-CM

## 2021-08-05 PROCEDURE — 92507 TX SP LANG VOICE COMM INDIV: CPT | Performed by: SPEECH-LANGUAGE PATHOLOGIST

## 2021-08-06 NOTE — PROGRESS NOTES
Speech-Language Pathology Discharge    Today's date: 2021   Patients name: Dalia Pepe  : 1945  MRN: [id number]  Safety measures: h/o CVA, HTN, COPD, aphasia,   Referring provider: Simba Rivera DO    Primary diagnosis/billing code: R47 01  Secondary diagnosis/billing code: [id number]    Visit tracking:  -Referring provider: Epic  -Billing guidelines: CMS  -Visit #1/10 (total visits: 25)  -Insurance: Medicare & [employer]    Subjective comments: Patient was in good spirits during today's session  Patient's goal(s): "to think"    Assessments    The [employer] Edition (BNT-2) is a confrontational naming assessment that asks patients to provide the best name for a given picture  It was designed to detect word-finding impairments  The BNT-2 consists of 60 pictures, ordered from easiest to most difficult  Stimulus cues, including semantic, phonemic, and written information, are provided as necessary  The following results were obtained during the administration of the assessment:     Summary of Scores: Score:     1  Number of spontaneously given correct response: 58           2  Number of stimulus cues given:  2     3  Number of correct responses following a stimulus cue:  0       RE: Status:   *TOTAL SCORE: 58 54 IMPROVEMENT   Percentile Rank:  95 85 IMPROVEMENT         Additional Cuein  Number of phonemic cues: 2     5  Number of correct responses following the phonemic cue: 1           6  Number of multiple choices given:  1     7  Number of correct choices: 0         RE indicates the scores from the re-evaluation (60/10/2021)  *Patient named 7 concrete category members (musical instruments) in 60 sec (norm=15+)  -- BELOW AVERAGE    *Patient named 6 abstract category members (words beginning with letter 'f') in 60 sec (norm=10+)  -- BELOW AVERAGE       THERAPY SESSION:  -Reviewed HEP worksheet (sentence construction)   Patient was noted to provide appropriate sentence construction in 80% of opportunities  Some errors with grammatical content noted  -Clinician presented patient with education on packet filled with cognitive-linguistic activity worksheets for independent Home Exercise Program  Also provided patient with instruction of other ways to work on cognitive-linguistic skills (e g , word searches, crossword puzzles, sudoku puzzles, reading books/novels, etc )  Reciprocal comprehension verbally expressed  Also provided education on d/c recommendations to patient's wife  Goals    Short-term goals:  1  Patient will be educated on word finding strategies (i e , circumlocution) for improved generative naming and verbal expression skills (to be achieved in 1-2 weeks)  -- MET    2  Patient will name an average of 15+ items in a category in 60 seconds over 5 trials using compensatory strategies to facilitate improved word retrieval skills (to be achieved in 4-6 weeks)  -- PARTIALLY MET    3  Patient will name an average of 10+ words that begin with a specific letter in 60 seconds over 5 trials using compensatory strategies to facilitate improved word retrieval skills (to be achieved in 4-6 weeks)  -- PARTIALLY MET    4  Patient will complete concrete and abstract categorization tasks to 80% accuracy to facilitate improved generative naming skills and working memory (to be achieved in 4-6 weeks)  -- PARTIALLY MET    5  Patient will complete word generation tasks (e g , analogies, category matrices, synonyms/antonyms, etc ) with 80% accuracy using word finding strategies to facilitate improved word retrieval skills (to be achieved in 4-6 weeks)  -- PARTIALLY MET    6  Patient will name up to 5 features to describe a person/place/thing with 80% accuracy to facilitate improved utilization of circumlocution strategy (to be achieved in 4-6 weeks)  -- PARTIALLY MET     Long-term goals:  1   Patient will improve ability to facilitate communication to meet needs including use of compensatory strategies to promote meaningful interactions for improved quality of life and maximize level of independence (to be achieved by discharge)  -- PARTIALLY MET    2  Patient will demonstrate adequate verbal expression during conversation without minimal breakdowns or word finding deficits (to be achieved by discharge)  -- PARTIALLY MET    Impressions/Recommendations    Impressions:   -Patient continues to present with moderate expressive aphasia s/p CVA c/b reduced content of connected speech, halting/slow rate of speech/processing, and phonemic paraphasias  Patient's overall MLU has increased within informal conversation and simple picture description tasks, as well as his verbal fluency and repetition skills  Patient struggles with conversational speaking, but benefits from listening partner providing him with additional processing time to organize language and find his words, as well as semantic and phonemic cues  Patient has been consistent with attendance in [address] [place] [address] Dr, attempted all assigned HEP worksheets, and has been motivated toward the goals in his POC during all ST sessions  It is suspected that patient has reached maximum potential in outpatient ST services  Patient to be discharged to an independent Home Exercise Program  Clinician presented patient with education on packet filled with cognitive-linguistic activity worksheets for his independent Home Exercise Program  Also provided patient with instruction of other ways to work on cognitive-linguistic skills (e g , word searches, crossword puzzles, sudoku puzzles, reading books/novels, etc )  Reciprocal comprehension verbally expressed  Also provided education on d/c recommendations to patient's wife       Recommendations:  -Patient to be discharged from outpatient skilled Speech Therapy services: Patient made good progress toward goals in plan of care and has reached maximum potential  Patient to be discharged to an independent Home Exercise Program      -Frequency: No futher treatment is warranted at this time   -Duration: N/A    -Intervention comments:   Speech-language re-evaluation/administration of assessment with patient (30 minutes)  Speech-language treatment (education on HEP and discharge recommendations, caregiver education) (30 minutes)

## 2021-08-09 ENCOUNTER — OFFICE VISIT (OUTPATIENT)
Dept: SPEECH THERAPY | Facility: CLINIC | Age: 76
End: 2021-08-09
Payer: MEDICARE

## 2021-08-09 DIAGNOSIS — Z86.73 HISTORY OF STROKE: ICD-10-CM

## 2021-08-09 DIAGNOSIS — R47.01 APHASIA: Primary | ICD-10-CM

## 2021-08-09 PROCEDURE — 92507 TX SP LANG VOICE COMM INDIV: CPT | Performed by: SPEECH-LANGUAGE PATHOLOGIST

## 2021-08-12 ENCOUNTER — EVALUATION (OUTPATIENT)
Dept: SPEECH THERAPY | Facility: CLINIC | Age: 76
End: 2021-08-12
Payer: MEDICARE

## 2021-08-12 DIAGNOSIS — R47.01 APHASIA: Primary | ICD-10-CM

## 2021-08-12 DIAGNOSIS — Z86.73 HISTORY OF STROKE: ICD-10-CM

## 2021-08-12 PROCEDURE — 92507 TX SP LANG VOICE COMM INDIV: CPT | Performed by: SPEECH-LANGUAGE PATHOLOGIST

## 2021-08-12 PROCEDURE — 92523 SPEECH SOUND LANG COMPREHEN: CPT | Performed by: SPEECH-LANGUAGE PATHOLOGIST

## 2021-09-03 ENCOUNTER — HOSPITAL ENCOUNTER (OUTPATIENT)
Dept: NON INVASIVE DIAGNOSTICS | Facility: CLINIC | Age: 76
Discharge: HOME | End: 2021-09-03
Payer: MEDICARE

## 2021-09-03 DIAGNOSIS — I42.8 NICM (NONISCHEMIC CARDIOMYOPATHY) (HCC): ICD-10-CM

## 2021-09-03 PROCEDURE — 93306 TTE W/DOPPLER COMPLETE: CPT

## 2021-09-03 PROCEDURE — 93306 TTE W/DOPPLER COMPLETE: CPT | Performed by: INTERNAL MEDICINE

## 2021-09-07 ENCOUNTER — TELEPHONE (OUTPATIENT)
Dept: HEMATOLOGY ONCOLOGY | Facility: CLINIC | Age: 76
End: 2021-09-07

## 2021-09-07 DIAGNOSIS — D61.9 APLASTIC ANEMIA (HCC): Primary | ICD-10-CM

## 2021-09-07 NOTE — TELEPHONE ENCOUNTER
Will forward to Dr Edin Lopez RN to verify what labs he would like for visit  6/9/21 office note stated repeat labs but did not specify which ones

## 2021-09-07 NOTE — TELEPHONE ENCOUNTER
Lab Orders in [facility]     Patient called to have labs placed in [facility]     Person calling in Spouse Guevara Díaz    Date Of Appointment 09/15/21    Call back number [phone]

## 2021-09-13 ENCOUNTER — TELEPHONE (OUTPATIENT)
Dept: HEMATOLOGY ONCOLOGY | Facility: CLINIC | Age: 76
End: 2021-09-13

## 2021-09-13 NOTE — TELEPHONE ENCOUNTER
I phoned the patient and reminded him that he has labs which need to be completed prior to his appointment with Nam Ruiz PA-C on 9/15  I explained to the patient that these labs are non-fasting and can be completed at any [facility] lab  The patient expressed understanding

## 2021-09-14 ENCOUNTER — OFFICE VISIT (OUTPATIENT)
Dept: PULMONOLOGY | Facility: CLINIC | Age: 76
End: 2021-09-14
Payer: MEDICARE

## 2021-09-14 ENCOUNTER — APPOINTMENT (OUTPATIENT)
Dept: LAB | Facility: CLINIC | Age: 76
End: 2021-09-14
Payer: MEDICARE

## 2021-09-14 VITALS
TEMPERATURE: 98.4 F | HEIGHT: 69 IN | SYSTOLIC BLOOD PRESSURE: 100 MMHG | HEART RATE: 62 BPM | OXYGEN SATURATION: 100 % | DIASTOLIC BLOOD PRESSURE: 58 MMHG | BODY MASS INDEX: 23.11 KG/M2 | WEIGHT: 156 LBS

## 2021-09-14 DIAGNOSIS — D61.9 APLASTIC ANEMIA (HCC): ICD-10-CM

## 2021-09-14 DIAGNOSIS — J44.9 COPD, SEVERE (HCC): Primary | ICD-10-CM

## 2021-09-14 DIAGNOSIS — F17.211 CIGARETTE NICOTINE DEPENDENCE IN REMISSION: ICD-10-CM

## 2021-09-14 DIAGNOSIS — D61.818 PANCYTOPENIA (HCC): ICD-10-CM

## 2021-09-14 DIAGNOSIS — Z72.0 TOBACCO ABUSE: ICD-10-CM

## 2021-09-14 DIAGNOSIS — Z86.73 HISTORY OF STROKE: ICD-10-CM

## 2021-09-14 LAB
ALBUMIN SERPL BCP-MCNC: 3.6 G/DL (ref 3.5–5)
ALP SERPL-CCNC: 79 U/L (ref 46–116)
ALT SERPL W P-5'-P-CCNC: 13 U/L (ref 12–78)
ANION GAP SERPL CALCULATED.3IONS-SCNC: 10 MMOL/L (ref 4–13)
AST SERPL W P-5'-P-CCNC: 7 U/L (ref 5–45)
BASOPHILS # BLD AUTO: 0.02 THOUSANDS/ΜL (ref 0–0.1)
BASOPHILS NFR BLD AUTO: 1 % (ref 0–1)
BILIRUB SERPL-MCNC: 0.4 MG/DL (ref 0.2–1)
BUN SERPL-MCNC: 52 MG/DL (ref 5–25)
CALCIUM SERPL-MCNC: 8.9 MG/DL (ref 8.3–10.1)
CHLORIDE SERPL-SCNC: 106 MMOL/L (ref 100–108)
CO2 SERPL-SCNC: 26 MMOL/L (ref 21–32)
CREAT SERPL-MCNC: 2.01 MG/DL (ref 0.6–1.3)
EOSINOPHIL # BLD AUTO: 0.33 THOUSAND/ΜL (ref 0–0.61)
EOSINOPHIL NFR BLD AUTO: 8 % (ref 0–6)
ERYTHROCYTE [DISTWIDTH] IN BLOOD BY AUTOMATED COUNT: 13.7 % (ref 11.6–15.1)
GFR SERPL CREATININE-BSD FRML MDRD: 32 ML/MIN/1.73SQ M
GLUCOSE P FAST SERPL-MCNC: 127 MG/DL (ref 65–99)
HCT VFR BLD AUTO: 26 % (ref 36.5–49.3)
HGB BLD-MCNC: 8.3 G/DL (ref 12–17)
IMM GRANULOCYTES # BLD AUTO: 0.01 THOUSAND/UL (ref 0–0.2)
IMM GRANULOCYTES NFR BLD AUTO: 0 % (ref 0–2)
LYMPHOCYTES # BLD AUTO: 1.38 THOUSANDS/ΜL (ref 0.6–4.47)
LYMPHOCYTES NFR BLD AUTO: 33 % (ref 14–44)
MCH RBC QN AUTO: 34.9 PG (ref 26.8–34.3)
MCHC RBC AUTO-ENTMCNC: 31.9 G/DL (ref 31.4–37.4)
MCV RBC AUTO: 109 FL (ref 82–98)
MONOCYTES # BLD AUTO: 0.46 THOUSAND/ΜL (ref 0.17–1.22)
MONOCYTES NFR BLD AUTO: 11 % (ref 4–12)
NEUTROPHILS # BLD AUTO: 2 THOUSANDS/ΜL (ref 1.85–7.62)
NEUTS SEG NFR BLD AUTO: 47 % (ref 43–75)
NRBC BLD AUTO-RTO: 0 /100 WBCS
PLATELET # BLD AUTO: 92 THOUSANDS/UL (ref 149–390)
PMV BLD AUTO: 11.4 FL (ref 8.9–12.7)
POTASSIUM SERPL-SCNC: 4.8 MMOL/L (ref 3.5–5.3)
PROT SERPL-MCNC: 7.6 G/DL (ref 6.4–8.2)
RBC # BLD AUTO: 2.38 MILLION/UL (ref 3.88–5.62)
SODIUM SERPL-SCNC: 142 MMOL/L (ref 136–145)
WBC # BLD AUTO: 4.2 THOUSAND/UL (ref 4.31–10.16)

## 2021-09-14 PROCEDURE — 36415 COLL VENOUS BLD VENIPUNCTURE: CPT

## 2021-09-14 PROCEDURE — 85025 COMPLETE CBC W/AUTO DIFF WBC: CPT

## 2021-09-14 PROCEDURE — 99214 OFFICE O/P EST MOD 30 MIN: CPT | Performed by: INTERNAL MEDICINE

## 2021-09-14 PROCEDURE — 80053 COMPREHEN METABOLIC PANEL: CPT

## 2021-09-14 NOTE — PROGRESS NOTES
Pulmonary Follow Up Note   Daysi Carmona 78 y o  male MRN: [id number]  9/14/2021      Assessment:  1  Dyspnea on Exertion   · Encouraged regular attempts at exercise/walking  · Consider pulmonary rehab in the future     2  Severe COPD  · GOLD Stage III, Class C - FEV1 1 06L 35% 10/2018  · Evidence of air trapping on PFTs  · Continue Incruse Ellipta   · Continue prn use of CLAYTON - hold on escalation to LAMA/LABA  · Reviewed signs and symptoms of AECOPD and encouraged to call should these develop  · Flu Vaccine 2020 - encouraged yearly, pneumovax 2017, prevnar 2015, COVID-19 x 2  · Follow up in 6 months or sooner as needed     3  Pulmonary Nodule  · Multiple risk factors for malignancy  · CT/PET with minimal FDG avidity - SUV 1 4  · Decreased size on repeat CT Chest (July 2019) and stable on 7/2020 and resolved on May 2021  · Plan to transition to LDCT chest annually     4  Nicotine dependence in sustained remission  · Again encouraged him to remain smoke free  · Reviewed continuing LDCT annually for lung cancer screening and he has agreed     5  Non-ischemic Cardiomyopathy EF 20-25% - appears euvolemic     6  Expressive Aphasia - further care per neurology, no sig improvement with speech therapy     7  Pancytopenia - now diagnosed with aplastic anemia and follows closely with hematology  Plan:    Diagnoses and all orders for this visit:    COPD, severe ([facility] [address] )    Tobacco abuse in remission    History of stroke    Pancytopenia ([facility] [address] )    Cigarette nicotine dependence in remission  -     CT lung screening program; Future        Return in about 6 months (around 3/14/2022) for Recheck  History of Present Illness   HPI:  Daysi Carmona is a 78 y o  male who presented for dyspnea and COPD evaluation by Dr Ramana Hagan  He has history of NICM EF 10-20% and HTN    He was initially seen in Jan 2019 and diagnosed with severe COPD and did not require supplemental oxygen   He was last seen in April 2021 with plans to continue Incruse, repeat CT in July 2021 and start speech therapy      He reports speech therapy has not made much improvement  He has stable degree of dyspnea  He reports rare CLAYTON use but uses Incruse daily  He denied purulent sputum production, no pleurisy, no hemoptysis  He reports he has stopped drinking beer and noted some mild weight loss  He denied rest or nocturnal dyspnea  He denied need for OCS since last visit      Review of Systems    Historical Information   Past Medical History:   Diagnosis Date    Cardiomyopathy [facility])     CHF (congestive heart failure) (HCC)     COPD (chronic obstructive pulmonary disease) (HCC)     Dyspnea on exertion     Essential hypertension     Non-ischemic cardiomyopathy (HCC)      Past Surgical History:   Procedure Laterality Date    IR BIOPSY BONE MARROW  5/26/2021    PILONIDAL CYST EXCISION       Family History   Problem Relation Age of Onset    Breast cancer Mother 55    Hypertension Father     Cancer Father     Cancer Family     Heart disease Maternal Uncle     Coronary artery disease Maternal Uncle     Heart failure Maternal Uncle     Breast cancer Sister 48    Heart attack Neg Hx     Stroke Neg Hx     Anuerysm Neg Hx     Arrhythmia Neg Hx     Clotting disorder Neg Hx     Hyperlipidemia Neg Hx          Meds/Allergies     Current Outpatient Medications:     aspirin (ECOTRIN LOW STRENGTH) 81 mg EC tablet, Take 81 mg by mouth daily, Disp: , Rfl:     atorvastatin (LIPITOR) 20 mg tablet, TAKE 1 TABLET BY MOUTH IN  THE EVENING, Disp: 90 tablet, Rfl: 3    bisoprolol (ZEBETA) 5 mg tablet, Take 0 5 tablets (2 5 mg total) by mouth 2 (two) times a day, Disp: 90 tablet, Rfl: 3    furosemide (LASIX) 20 mg tablet, TAKE 2 TABLETS BY MOUTH  EVERY MORNING AND 1 TABLET  BY MOUTH IN THE AFTERNOON  FOR TOTAL OF 60MG PER DAY, Disp: 270 tablet, Rfl: 3    Incruse Ellipta 62 5 MCG/INH AEPB inhaler, USE 1 PUFF BY MOUTH DAILY, Disp: 90 each, Rfl: 3    sacubitril-valsartan (Entresto)  MG TABS, Take 1 tablet by mouth 2 (two) times a day, Disp: 180 tablet, Rfl: 3    Ventolin  (90 Base) MCG/ACT inhaler, USE 2 INHALATIONS BY MOUTH  EVERY 6 HOURS AS NEEDED FOR WHEEZING, Disp: 54 g, Rfl: 3  No Known Allergies    Vitals: Blood pressure 100/58, pulse 62, temperature 98 4 °F (36 9 °C), height 5' 9" (1 753 m), weight 70 8 kg (156 lb), SpO2 100 %  Body mass index is 23 04 kg/m²  Oxygen Therapy  SpO2: 100 %  Oxygen Therapy: None (Room air)      Physical Exam  Physical Exam    Labs: I have personally reviewed pertinent lab results    Lab Results   Component Value Date    WBC 4 20 (L) 09/14/2021    HGB 8 3 (L) 09/14/2021    HCT 26 0 (L) 09/14/2021     (H) 09/14/2021    PLT 92 (L) 09/14/2021     Lab Results   Component Value Date    GLUCOSE 100 10/22/2015    CALCIUM 8 9 09/14/2021     10/22/2015    K 4 8 09/14/2021    CO2 26 09/14/2021     09/14/2021    BUN 52 (H) 09/14/2021    CREATININE 2 01 (H) 09/14/2021     No results found for: IGE  Lab Results   Component Value Date    ALT 13 09/14/2021    AST 7 09/14/2021    ALKPHOS 79 09/14/2021    BILITOT 0 69 10/22/2015       Imaging and other studies: New images and reports personally reviewed  CT Chest 5/23/21 - background emphysema, resolved RLL nodule, no new lesions    CT Chest 7/6/2020 - background emphysema, stable subpleural scarring in RML and lingula, RLL subpleural nodule noted, bilateral calcified pleural plaques     CT Chest 7/18/19 - resolved LLL pneumonia with resolving RLL nodule and likely residual scar, background upper lobe emphysematous changes, bilateral pleural plaques     CT/PET - 4/17/19 - 1 3x0 8cm RLL nodule with SUV 1 4, 4mm RLL nodule, slight nodular density LLL, no significant mediastinal adenopathy     CT Chest 4/5/19 - noted upper lobe predominant emphysematous changes with 1 2cm RLL spiculated nodule, trace right effusion within fissure, no significant mediastinal adenopathy     CXR 10/22/18 - hyperinflation, flat HD and enlarged anterior/posterior clear space, blunted right CP angle with obscured right HD and suggestion of basilar scarring, no PTX, mild volume loss on right     Pulmonary function testing:   10/22/18 - Ratio 47%, FVC 2 25L (55%), FEV1 1 06L (35%), FEV1 inc 1% post BD, TLC 88%, %, DLCO 19% - severe obstructive airflow defect with reduced VC, normal TLC with increased RV indicative of air trapping and severely reduced diffusion     01/14/19 - 6MWT on RA - total walk distance 397 meters, initial SpO2 97%, romeo SpO2 94%, at conclusion 95%, initial HR 85bpm, maximal HR 107bpm, Ruben Dyspnea Scale 0/10-->1/10     EKG, Pathology, and Other Studies: I have personally reviewed pertinent reports     TTE 9/2021 - EF 20-25%, mild dilated RV, PASP 37mmHg    TTE 1/2019 - TTE EF 10%, PASP 51mmHg, dilated RV     TTE 2014 - EF 20%, normal RV, PASP in normal range    Ross aBnda, DO, FACP  [facility]    Answers for HPI/ROS submitted by the patient on 9/7/2021  Do you have difficulty breathing?: Yes  Do you experience frequent throat clearing?: Yes  Chronicity: recurrent  When did you first notice your symptoms?: more than 1 year ago  How often do your symptoms occur?: daily  Since you first noticed this problem, how has it changed?: unchanged  Do you have shortness of breath that occurs with effort or exertion?: Yes  Do you have ear congestion?: No  Do you have heartburn?: No  Do you have fatigue?: Yes  Do you have nasal congestion?: Yes  Do you have shortness of breath when lying flat?: No  Do you have shortness of breath when you wake up?: No  Do you have sweats?: No  Have you experienced weight loss?: No  Which of the following makes your symptoms worse?: change in weather, pollen  Risk factors for lung disease: occupational exposure, smoking/tobacco exposure

## 2021-09-15 ENCOUNTER — OFFICE VISIT (OUTPATIENT)
Dept: HEMATOLOGY ONCOLOGY | Facility: CLINIC | Age: 76
End: 2021-09-15
Payer: MEDICARE

## 2021-09-15 VITALS
DIASTOLIC BLOOD PRESSURE: 60 MMHG | SYSTOLIC BLOOD PRESSURE: 94 MMHG | BODY MASS INDEX: 23.11 KG/M2 | HEART RATE: 62 BPM | HEIGHT: 69 IN | RESPIRATION RATE: 18 BRPM | WEIGHT: 156 LBS | OXYGEN SATURATION: 97 % | TEMPERATURE: 97.1 F

## 2021-09-15 DIAGNOSIS — D61.818 PANCYTOPENIA (HCC): ICD-10-CM

## 2021-09-15 DIAGNOSIS — D61.9 APLASTIC ANEMIA (HCC): Primary | ICD-10-CM

## 2021-09-15 DIAGNOSIS — E53.8 B12 DEFICIENCY: ICD-10-CM

## 2021-09-15 DIAGNOSIS — N17.9 AKI (ACUTE KIDNEY INJURY) (HCC): ICD-10-CM

## 2021-09-15 PROCEDURE — 99214 OFFICE O/P EST MOD 30 MIN: CPT | Performed by: INTERNAL MEDICINE

## 2021-09-15 NOTE — PROGRESS NOTES
HEMATOLOGY CLINIC NOTE    Primary Care Provider: Pablo Cha DO  Referring Provider:    MRN: [id number]  : 1945    Assessment / Plan:     1  Aplastic anemia ([facility] 75 )  Patient with diagnosis of aplastic anemia since 2021  Currently only on observation  Total WBC 4 20, HGB 8 3, , PLT 92, ANC 2 0, other diff unremarkable  Only patient's hemoglobin has mildly decreased to 8 3 from 9 0 in 2021  I noticed patient's kidney functions have been decreased with creatinine increased  It is possible kidney disease is causing this anemia especially with other cell lines unchanged  Patient denies any symptoms such as constitutional symptoms, bleeding, shortness of breath at rest, lightheadedness, dizziness, mucosal bleeding, petechial like rashes, frequent infections  He is currently taking once daily multivitamin  He has been sober since last visit with Dr Giuliana Alex     For now, we will continue to monitor patient with labs once a month and follow-up in 5 months  Should he developed the above symptoms, patient understands he should call our office so we can obtain blood work sooner and/or provide him further treatment  - CBC and differential; Standing  - Comprehensive metabolic panel; Standing    2  Pancytopenia ([place] [id number] )  - as above  3  B12 deficiency  B12 in 2021 was low normal in the 200s  Patient has been taking once daily B12  We will recheck this lab in 3 months     - Vitamin B12; Future    4  MARLENI (acute kidney injury) ([place] [address] )  Patient's creatinine 2 01, BUN 52  Kidney functions have worsened over the last few months  Patient will call his PCP today to make an appt regarding this  Informed him to increase fluid intake; however, patient is on diuretics and has CHF  Again, he will see his PCP regarding this  Patient voiced understanding and agreement to the above  The patient knows to call the office with any questions or concerns regarding the above      I have spent 30 minutes with Patient today in which greater than 50% of this time was spent in counseling/coordination of care regarding Diagnostic results, Intructions for management, Patient and family education, Importance of tx compliance, Risk factor reductions and Impressions  Reason for visit:       Chief Complaint   Patient presents with    Follow-up       History of Hematology Illness:     Eugenie Garcia is a 78 y o  male who came in for follow up  1  Aplastic anemia  - bone marrow biopsy in 05/2021 showed stem cell 5%  - mild, ANC 1 4 K     2  Pancytopenia   - since at least 4/2018   - 5/2021 c/s labs: flow cytometry, SPEP, iron panel, haptoglobin, HIV, FOBT, US abdomen all unremarkable  B12 borderline low  Reticulocytes low --> abs 30K, pct 1 09      3  Drink beers daily, 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    Interval History:   5/4/2021: This is a 79-year-old male with a past medical history of COPD, nonischemic cardiomyopathy, hypertension, history of stroke, CHF presenting for consultation regarding pancytopenia  Patient presents with wife Noreen Shelley  Patient has never seen a hematologist before  He denies any frequent infections, fever, chills, night sweats, lumps, bumps, sudden weight loss  Denies bleeding  He notes a poor diet, lacking fruits and vegetables  Denies any malabsorption issue  Denies personal history of cancer, liver disease, chronic kidney disease      Patient did smoke for 50+ years, recently quit in 2018  He drinks 2 cans of beer a day  Retired, worked at [employer], 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  He does have a family history of breast cancer in mother and sister  Lung cancer history in father and brother  He has never had a colonoscopy before       He does not take oral iron, does take once daily baby aspirin     "6/9/2021:  Patient came for follow-up, subjectively doing okay no infection no bleeding  Body weight stable no constitutional symptoms    No autoimmune disease "    9/15/2021:  Patient came in for follow-up  Patient offers no complaints today  He denies any constitutional symptoms such as fever, chills, night sweats, lumps, bumps, sudden weight loss  He denies any frequent infections  No shortness of breath at rest, lightheadedness, dizziness, bleeding  He denies any mucosal bleeding, petechial like rashes  He has been sober since last visit with Dr Charlie Stewart     Problem list:       Patient Active Problem List   Diagnosis    NICM [facility]nonischemic cardiomyopathy) ([facility] )    Essential hypertension    Dyspnea on exertion    [facility]    Abnormal CXR    Tobacco abuse in remission    Aphasia    Pulmonary nodule    History of stroke    Slurred speech    Pancytopenia ([facility] )    Unhealthy alcohol drinking behavior    B12 deficiency       REVIEW OF SYMPTOMS:   Review of Systems   Constitutional: Negative for activity change, appetite change, chills, diaphoresis, fatigue, fever and unexpected weight change  HENT: Negative for mouth sores and nosebleeds  Respiratory: Negative for apnea, cough, choking, chest tightness, shortness of breath, wheezing and stridor  Cardiovascular: Negative for chest pain, palpitations and leg swelling  Gastrointestinal: Negative for abdominal distention, abdominal pain, anal bleeding, blood in stool, constipation, diarrhea, nausea and vomiting  Endocrine: Negative for cold intolerance  Genitourinary: Negative for hematuria  Musculoskeletal: Negative for arthralgias  Skin: Negative for color change, pallor, rash and wound  Neurological: Negative for dizziness, weakness, light-headedness and headaches  Hematological: Negative for adenopathy  Does not bruise/bleed easily  PHYSICAL EXAMINATION:     Vital Signs:   BP 94/60   Pulse 62   Temp (!) 97 1 °F (36 2 °C) (Temporal)   Resp 18   Ht 5' 9" (1 753 m)   Wt 70 8 kg (156 lb)   SpO2 97%   BMI 23 04 kg/m²   Body surface area is 1 86 meters squared     Ht Readings from Last 8 Encounters:   09/15/21 5' 9" (1 753 m)   09/14/21 5' 9" (1 753 m)   07/13/21 5' 8" (1 727 m)   06/09/21 5' 8" (1 727 m)   05/26/21 5' 8" (1 727 m)   05/04/21 5' 8" (1 727 m)   04/30/21 5' 8" (1 727 m)   10/12/20 5' 8" (1 727 m)       Wt Readings from Last 8 Encounters:   09/15/21 70 8 kg (156 lb)   09/14/21 70 8 kg (156 lb)   07/13/21 73 kg (161 lb)   06/09/21 74 8 kg (165 lb)   05/26/21 75 8 kg (167 lb)   05/04/21 76 kg (167 lb 8 oz)   04/30/21 75 8 kg (167 lb)   10/12/20 79 5 kg (175 lb 3 2 oz)          Physical Exam  Constitutional:       General: He is not in acute distress  Appearance: Normal appearance  He is normal weight  He is not ill-appearing, toxic-appearing or diaphoretic  HENT:      Head: Normocephalic and atraumatic  Eyes:      General: No scleral icterus  Extraocular Movements: Extraocular movements intact  Conjunctiva/sclera: Conjunctivae normal    Cardiovascular:      Rate and Rhythm: Normal rate and regular rhythm  Heart sounds: Normal heart sounds  Pulmonary:      Effort: Pulmonary effort is normal  No respiratory distress  Breath sounds: Normal breath sounds  No stridor  No wheezing, rhonchi or rales  Abdominal:      General: Bowel sounds are normal       Palpations: There is no mass  Tenderness: There is no abdominal tenderness  Musculoskeletal:      Cervical back: Normal range of motion and neck supple  Right lower leg: No edema  Left lower leg: No edema  Lymphadenopathy:      Cervical: No cervical adenopathy  Skin:     General: Skin is warm and dry  Coloration: Skin is not jaundiced or pale  Findings: No bruising, erythema, lesion or rash  Neurological:      General: No focal deficit present  Mental Status: He is alert and oriented to person, place, and time  Mental status is at baseline     Psychiatric:         Mood and Affect: Mood normal          Behavior: Behavior normal          Thought Content: Thought content normal          Reviewed historical information  PAST MEDICAL HISTORY:    Past Medical History:   Diagnosis Date    Cardiomyopathy ([facility] )     CHF (congestive heart failure) ([facility] )     [facility]     Dyspnea on exertion     Essential hypertension     Non-ischemic cardiomyopathy (HCC)        PAST SURGICAL HISTORY:    Past Surgical History:   Procedure Laterality Date    IR BIOPSY BONE MARROW  5/26/2021    PILONIDAL CYST EXCISION           CURRENT MEDICATIONS:     Current Outpatient Medications:     aspirin (ECOTRIN LOW STRENGTH) 81 mg EC tablet, Take 81 mg by mouth daily, Disp: , Rfl:     atorvastatin (LIPITOR) 20 mg tablet, TAKE 1 TABLET BY MOUTH IN  THE EVENING, Disp: 90 tablet, Rfl: 3    bisoprolol (ZEBETA) 5 mg tablet, Take 0 5 tablets (2 5 mg total) by mouth 2 (two) times a day, Disp: 90 tablet, Rfl: 3    furosemide (LASIX) 20 mg tablet, TAKE 2 TABLETS BY MOUTH  EVERY MORNING AND 1 TABLET  BY MOUTH IN THE AFTERNOON  FOR TOTAL OF 60MG PER DAY, Disp: 270 tablet, Rfl: 3    Incruse Ellipta 62 5 MCG/INH AEPB inhaler, USE 1 PUFF BY MOUTH DAILY, Disp: 90 each, Rfl: 3    sacubitril-valsartan (Entresto)  MG TABS, Take 1 tablet by mouth 2 (two) times a day, Disp: 180 tablet, Rfl: 3    Ventolin  (90 Base) MCG/ACT inhaler, USE 2 INHALATIONS BY MOUTH  EVERY 6 HOURS AS NEEDED FOR WHEEZING, Disp: 54 g, Rfl: 3    SOCIAL HISTORY:    Social History     Tobacco Use    Smoking status: Former Smoker     Packs/day: 1 00     Years: 63 00     Pack years: 63 00     Types: Cigarettes     Start date: 1955     Quit date: 2018     Years since quitting: 3 7    Smokeless tobacco: Never Used    Tobacco comment: Two cigarettes a day with lunch and dinner    Vaping Use    Vaping Use: Never used   Substance Use Topics    Alcohol use: Not Currently     Alcohol/week: 2 0 standard drinks     Types: 2 Cans of beer per week     Comment: occasion    Drug use:  No FAMILY HISTORY:    Family History   Problem Relation Age of Onset   Ciera Shaw Breast cancer Mother 55    Hypertension Father     Cancer Father     Cancer Family     Heart disease Maternal Uncle     Coronary artery disease Maternal Uncle     Heart failure Maternal Uncle     Breast cancer Sister 48    Heart attack Neg Hx     Stroke Neg Hx     Anuerysm Neg Hx     Arrhythmia Neg Hx     Clotting disorder Neg Hx     Hyperlipidemia Neg Hx        ALLERGIES:  No Known Allergies      LAB:    Lab Results   Component Value Date    WBC 4 20 (L) 2021    HGB 8 3 (L) 2021    HCT 26 0 (L) 2021     (H) 2021    PLT 92 (L) 2021       Lab Results   Component Value Date     10/22/2015    SODIUM 142 2021    K 4 8 2021     2021    CO2 26 2021    ANIONGAP 3 (L) 10/22/2015    AGAP 10 2021    BUN 52 (H) 2021    CREATININE 2 01 (H) 2021    GLUC 93 2016    GLUF 127 (H) 2021    CALCIUM 8 9 2021    AST 7 2021    ALT 13 2021    ALKPHOS 79 2021    PROT 7 7 10/22/2015    TP 7 6 2021    BILITOT 0 69 10/22/2015    TBILI 0 40 2021    EGFR 32 2021       IMAGING:  Echo complete with contrast if indicated  [address] [place] [address], [address] [place] [address]  [phone]    Transthoracic Echocardiogram  2D, M-mode, Doppler, and Color Doppler    Study date:  03-Sep-2021    Patient: Mendoza Mckeon  MR number: [id number]  Account number: [de-identified]  : 1945  Age: 78 years  Gender: Male  Status: Outpatient  Location: [facility]  Height: 68 in  Weight: 160 6 lb  BP: 110/ 58 mmHg    Indications: NICM      Diagnoses: I42 8 - Other cardiomyopathies    Sonographer:  LENIN Carrington  Primary Physician:  Narda Owusu DO  Referring Physician:  Clayton Mckenzie MD  Group:  [id number]  Interpreting Physician:  Thanh Durham MD    SUMMARY    PROCEDURE INFORMATION:  This was a technically difficult study  LEFT VENTRICLE:  The ventricle was moderately dilated  Systolic function was severely reduced  Ejection fraction was estimated in the range of 20 % to 25 %  There was severe diffuse hypokinesis with distinct regional wall motion abnormalities  Doppler parameters were consistent with abnormal left ventricular relaxation (grade 1 diastolic dysfunction)  Doppler parameters were consistent with elevated mean left atrial filling pressure  Although there was no diagnostic evidence for apical thrombus, this study is not adequate to completely exclude the possibility  Endocardium poorly visualised  Consider constast ECHO  RIGHT VENTRICLE:  The ventricle was mildly dilated  LEFT ATRIUM:  The atrium was markedly dilated  RIGHT ATRIUM:  The atrium was mildly dilated  MITRAL VALVE:  There was mild regurgitation  AORTIC VALVE:  There was no evidence for stenosis  TRICUSPID VALVE:  There was mild regurgitation  Estimated peak PA pressure was 37 mmHg  PULMONIC VALVE:  There was mild regurgitation  IVC, HEPATIC VEINS:  The inferior vena cava was mildly dilated  Respirophasic changes were normal     HISTORY: PRIOR HISTORY: NICM, COPD, HTN, PATTEN, former smoker, CVA  PROCEDURE: The study was performed in the [facility]  This was a routine study  The transthoracic approach was used  The study included complete 2D imaging, M-mode, complete spectral Doppler, and color Doppler  The  heart rate was 54 bpm, at the start of the study  Images were obtained from the parasternal, apical, subcostal, and suprasternal notch acoustic windows  This was a technically difficult study  LEFT VENTRICLE: The ventricle was moderately dilated  Systolic function was severely reduced  Ejection fraction was estimated in the range of 20 % to 25 %   There was severe diffuse hypokinesis with distinct regional wall motion  abnormalities  Wall thickness was normal  Although there was no diagnostic evidence for apical thrombus, this study is not adequate to completely exclude the possibility  Endocardium poorly visualised  Consider constast ECHO  DOPPLER:  Doppler parameters were consistent with abnormal left ventricular relaxation (grade 1 diastolic dysfunction)  Doppler parameters were consistent with elevated mean left atrial filling pressure  RIGHT VENTRICLE: The ventricle was mildly dilated  Systolic function was normal  Wall thickness was normal     LEFT ATRIUM: The atrium was markedly dilated  RIGHT ATRIUM: The atrium was mildly dilated  MITRAL VALVE: Valve structure was normal  There was normal leaflet separation  DOPPLER: The transmitral velocity was within the normal range  There was no evidence for stenosis  There was mild regurgitation  AORTIC VALVE: The valve was trileaflet  Leaflets exhibited normal thickness and normal cuspal separation  DOPPLER: Transaortic velocity was within the normal range  There was no evidence for stenosis  There was no significant  regurgitation  TRICUSPID VALVE: The valve structure was normal  There was normal leaflet separation  DOPPLER: The transtricuspid velocity was within the normal range  There was no evidence for stenosis  There was mild regurgitation  Pulmonary artery  systolic pressure was mildly increased  Estimated peak PA pressure was 37 mmHg  PULMONIC VALVE: Leaflets exhibited normal thickness, no calcification, and normal cuspal separation  DOPPLER: The transpulmonic velocity was within the normal range  There was mild regurgitation  PERICARDIUM: There was no pericardial effusion  The pericardium was normal in appearance  AORTA: The root exhibited normal size  SYSTEMIC VEINS: IVC: The inferior vena cava was mildly dilated   Respirophasic changes were normal     SYSTEM MEASUREMENT TABLES    2D  %FS: 4 88 %  Ao Diam: 3 76 cm  EDV(Teich): 240 43 ml  EF Biplane: 31 46 %  EF(Teich): 10 72 %  ESV(Teich): 214 64 ml  IVSd: 0 87 cm  LA Diam: 4 22 cm  LAAs A2C: 29 06 cm2  LAAs A4C: 36 57 cm2  LAESV A-L A2C: 103 95 ml  LAESV A-L A4C: 177 89 ml  LAESV Index (A-L): 76 ml/m2  LAESV MOD A2C: 99 41 ml  LAESV MOD A4C: 169 92 ml  LAESV(A-L): 141 36 ml  LAESV(MOD BP): 135 01 ml  LAESVInd MOD BP: 72 58 ml/m2  LALs A2C: 6 9 cm  LALs A4C: 6 38 cm  LVEDV MOD A2C: 305 74 ml  LVEDV MOD A4C: 290 04 ml  LVEDV MOD BP: 304 7 ml  LVEDVInd MOD BP: 163 82 ml/m2  LVEF MOD A2C: 31 12 %  LVEF MOD A4C: 29 79 %  LVESV MOD A2C: 210 59 ml  LVESV MOD A4C: 203 63 ml  LVESV MOD BP: 208 84 ml  LVESVInd MOD BP: 112 28 ml/m2  LVIDd: 6 81 cm  LVIDs: 6 48 cm  LVLd A2C: 10 24 cm  LVLd A4C: 9 75 cm  LVLs A2C: 9 15 cm  LVLs A4C: 8 96 cm  LVPWd: 0 63 cm  Leda A4C: 18 01 cm2  RAAs A4C: 22 48 cm2  RAEDV A-L: 58 65 ml  RAEDV MOD: 58 98 ml  RAESV A-L: 76 47 ml  RAESV MOD: 74 55 ml  RALd: 4 7 cm  RALs: 5 61 cm  RVIDd: 4 47 cm  SV MOD A2C: 95 15 ml  SV MOD A4C: 86 41 ml  SV(Teich): 25 79 ml    CW  AV Env  Ti: 327 31 ms  AV MaxP 2 mmHg  AV VTI: 29 72 cm  AV Vmax: 1 34 m/s  AV Vmean: 0 91 m/s  AV meanPG: 3 74 mmHg  PRend P 6 mmHg  PRend Vmax: 1 26 m/s  TR MaxP 89 mmHg  TR Vmax: 2 64 m/s    PW  E' Sept: 0 05 m/s  E/E' Sept: 16 63  LVOT Env  Ti: 338 73 ms  LVOT VTI: 14 7 cm  LVOT Vmax: 0 64 m/s  LVOT Vmean: 0 43 m/s  LVOT maxP 63 mmHg  LVOT meanP 87 mmHg  MV A Anupam: 0 99 m/s  MV Dec [place]: 3 58 m/s2  MV DecT: 232 93 ms  MV E Anupam: 0 83 m/s  MV E/A Ratio: 0 84  MV PHT: 67 55 ms  MVA By PHT: 3 26 cm2  PV Acc [place]: 7 62 m/s2  PV AccT: 87 54 ms    [facility] Accredited [facility]    Prepared and electronically signed by    Estrella Sim MD  Signed 03-Sep-2021 14:37:46

## 2021-09-29 ENCOUNTER — TELEPHONE (OUTPATIENT)
Dept: CARDIOLOGY CLINIC | Facility: CLINIC | Age: 76
End: 2021-09-29

## 2021-09-29 NOTE — TELEPHONE ENCOUNTER
Called patient and gave results      ----- Message from Dilshad Yost MD sent at 9/21/2021 12:46 PM EDT -----  Please call patient and let him know there was slight improvement in cardiac function (improved from 10% to 25%) with recent echo  Thanks

## 2021-10-07 ENCOUNTER — APPOINTMENT (OUTPATIENT)
Dept: LAB | Facility: CLINIC | Age: 76
End: 2021-10-07
Payer: MEDICARE

## 2021-10-07 LAB
ALBUMIN SERPL BCP-MCNC: 3.9 G/DL (ref 3.5–5)
ALP SERPL-CCNC: 61 U/L (ref 46–116)
ALT SERPL W P-5'-P-CCNC: 17 U/L (ref 12–78)
ANION GAP SERPL CALCULATED.3IONS-SCNC: 11 MMOL/L (ref 4–13)
AST SERPL W P-5'-P-CCNC: 14 U/L (ref 5–45)
BASOPHILS # BLD AUTO: 0.02 THOUSANDS/ΜL (ref 0–0.1)
BASOPHILS NFR BLD AUTO: 1 % (ref 0–1)
BILIRUB SERPL-MCNC: 0.45 MG/DL (ref 0.2–1)
BUN SERPL-MCNC: 42 MG/DL (ref 5–25)
CALCIUM SERPL-MCNC: 9 MG/DL (ref 8.3–10.1)
CHLORIDE SERPL-SCNC: 106 MMOL/L (ref 100–108)
CO2 SERPL-SCNC: 26 MMOL/L (ref 21–32)
CREAT SERPL-MCNC: 1.61 MG/DL (ref 0.6–1.3)
EOSINOPHIL # BLD AUTO: 0.31 THOUSAND/ΜL (ref 0–0.61)
EOSINOPHIL NFR BLD AUTO: 7 % (ref 0–6)
ERYTHROCYTE [DISTWIDTH] IN BLOOD BY AUTOMATED COUNT: 14.6 % (ref 11.6–15.1)
GFR SERPL CREATININE-BSD FRML MDRD: 42 ML/MIN/1.73SQ M
GLUCOSE P FAST SERPL-MCNC: 95 MG/DL (ref 65–99)
HCT VFR BLD AUTO: 28.6 % (ref 36.5–49.3)
HGB BLD-MCNC: 9.1 G/DL (ref 12–17)
IMM GRANULOCYTES # BLD AUTO: 0.01 THOUSAND/UL (ref 0–0.2)
IMM GRANULOCYTES NFR BLD AUTO: 0 % (ref 0–2)
LYMPHOCYTES # BLD AUTO: 1.79 THOUSANDS/ΜL (ref 0.6–4.47)
LYMPHOCYTES NFR BLD AUTO: 41 % (ref 14–44)
MCH RBC QN AUTO: 34.9 PG (ref 26.8–34.3)
MCHC RBC AUTO-ENTMCNC: 31.8 G/DL (ref 31.4–37.4)
MCV RBC AUTO: 110 FL (ref 82–98)
MONOCYTES # BLD AUTO: 0.47 THOUSAND/ΜL (ref 0.17–1.22)
MONOCYTES NFR BLD AUTO: 11 % (ref 4–12)
NEUTROPHILS # BLD AUTO: 1.75 THOUSANDS/ΜL (ref 1.85–7.62)
NEUTS SEG NFR BLD AUTO: 40 % (ref 43–75)
NRBC BLD AUTO-RTO: 0 /100 WBCS
PLATELET # BLD AUTO: 93 THOUSANDS/UL (ref 149–390)
PMV BLD AUTO: 11.6 FL (ref 8.9–12.7)
POTASSIUM SERPL-SCNC: 5.4 MMOL/L (ref 3.5–5.3)
PROT SERPL-MCNC: 7.5 G/DL (ref 6.4–8.2)
RBC # BLD AUTO: 2.61 MILLION/UL (ref 3.88–5.62)
SODIUM SERPL-SCNC: 143 MMOL/L (ref 136–145)
WBC # BLD AUTO: 4.35 THOUSAND/UL (ref 4.31–10.16)

## 2021-10-07 PROCEDURE — 80053 COMPREHEN METABOLIC PANEL: CPT | Performed by: INTERNAL MEDICINE

## 2021-10-07 PROCEDURE — 36415 COLL VENOUS BLD VENIPUNCTURE: CPT | Performed by: INTERNAL MEDICINE

## 2021-10-07 PROCEDURE — 85025 COMPLETE CBC W/AUTO DIFF WBC: CPT | Performed by: INTERNAL MEDICINE

## 2021-10-19 ENCOUNTER — HOSPITAL ENCOUNTER (OUTPATIENT)
Dept: ULTRASOUND IMAGING | Facility: HOSPITAL | Age: 76
Discharge: HOME | End: 2021-10-19
Attending: FAMILY MEDICINE
Payer: MEDICARE

## 2021-10-19 DIAGNOSIS — N18.32 CHRONIC KIDNEY DISEASE, STAGE 3B (HCC): ICD-10-CM

## 2021-10-19 PROCEDURE — 76770 US EXAM ABDO BACK WALL COMP: CPT

## 2021-10-27 ENCOUNTER — TELEPHONE (OUTPATIENT)
Dept: UROLOGY | Facility: MEDICAL CENTER | Age: 76
End: 2021-10-27

## 2021-11-18 ENCOUNTER — APPOINTMENT (OUTPATIENT)
Dept: LAB | Facility: CLINIC | Age: 76
End: 2021-11-18
Payer: MEDICARE

## 2021-11-18 DIAGNOSIS — E53.8 B12 DEFICIENCY: ICD-10-CM

## 2021-11-18 LAB — VIT B12 SERPL-MCNC: 736 PG/ML (ref 100–900)

## 2021-11-18 PROCEDURE — 82607 VITAMIN B-12: CPT

## 2021-11-22 DIAGNOSIS — Z86.73 HISTORY OF STROKE: ICD-10-CM

## 2021-11-22 DIAGNOSIS — J44.9 COPD, SEVERE (HCC): ICD-10-CM

## 2021-11-22 RX ORDER — ATORVASTATIN CALCIUM 20 MG/1
TABLET, FILM COATED ORAL
Qty: 90 TABLET | Refills: 3 | Status: SHIPPED | OUTPATIENT
Start: 2021-11-22

## 2021-11-30 ENCOUNTER — OFFICE VISIT (OUTPATIENT)
Dept: UROLOGY | Facility: CLINIC | Age: 76
End: 2021-11-30
Payer: MEDICARE

## 2021-11-30 VITALS
BODY MASS INDEX: 23.4 KG/M2 | SYSTOLIC BLOOD PRESSURE: 98 MMHG | WEIGHT: 158 LBS | DIASTOLIC BLOOD PRESSURE: 62 MMHG | HEIGHT: 69 IN

## 2021-11-30 DIAGNOSIS — Z12.5 SCREENING FOR PROSTATE CANCER: Primary | ICD-10-CM

## 2021-11-30 DIAGNOSIS — R33.9 INCOMPLETE EMPTYING OF BLADDER: ICD-10-CM

## 2021-11-30 LAB — POST-VOID RESIDUAL VOLUME, ML POC: 274 ML

## 2021-11-30 PROCEDURE — 99203 OFFICE O/P NEW LOW 30 MIN: CPT | Performed by: PHYSICIAN ASSISTANT

## 2021-11-30 PROCEDURE — 51798 US URINE CAPACITY MEASURE: CPT | Performed by: PHYSICIAN ASSISTANT

## 2021-11-30 RX ORDER — FINASTERIDE 5 MG/1
5 TABLET, FILM COATED ORAL DAILY
Qty: 90 TABLET | Refills: 2 | Status: SHIPPED | OUTPATIENT
Start: 2021-11-30

## 2021-12-16 ENCOUNTER — APPOINTMENT (OUTPATIENT)
Dept: LAB | Facility: CLINIC | Age: 76
End: 2021-12-16
Payer: MEDICARE

## 2022-01-01 ENCOUNTER — APPOINTMENT (OUTPATIENT)
Dept: ULTRASOUND IMAGING | Facility: HOSPITAL | Age: 77
DRG: 308 | End: 2022-01-01
Payer: MEDICARE

## 2022-01-01 ENCOUNTER — HOSPITAL ENCOUNTER (OUTPATIENT)
Dept: INFUSION CENTER | Facility: CLINIC | Age: 77
Discharge: HOME | DRG: 308 | End: 2022-09-19
Payer: MEDICARE

## 2022-01-01 ENCOUNTER — TELEPHONE (OUTPATIENT)
Dept: PULMONOLOGY | Facility: CLINIC | Age: 77
End: 2022-01-01

## 2022-01-01 ENCOUNTER — APPOINTMENT (INPATIENT)
Dept: NON INVASIVE DIAGNOSTICS | Facility: HOSPITAL | Age: 77
DRG: 308 | End: 2022-01-01
Payer: MEDICARE

## 2022-01-01 ENCOUNTER — TELEPHONE (OUTPATIENT)
Dept: HEMATOLOGY ONCOLOGY | Facility: CLINIC | Age: 77
End: 2022-01-01

## 2022-01-01 ENCOUNTER — EPISODE CHANGES (OUTPATIENT)
Dept: CASE MANAGEMENT | Facility: OTHER | Age: 77
End: 2022-01-01

## 2022-01-01 ENCOUNTER — APPOINTMENT (OUTPATIENT)
Dept: RADIOLOGY | Facility: HOSPITAL | Age: 77
DRG: 308 | End: 2022-01-01
Payer: MEDICARE

## 2022-01-01 ENCOUNTER — HOSPITAL ENCOUNTER (INPATIENT)
Facility: HOSPITAL | Age: 77
LOS: 2 days | DRG: 308 | End: 2022-09-24
Attending: EMERGENCY MEDICINE | Admitting: INTERNAL MEDICINE
Payer: MEDICARE

## 2022-01-01 VITALS
BODY MASS INDEX: 23.04 KG/M2 | HEIGHT: 68 IN | OXYGEN SATURATION: 91 % | TEMPERATURE: 97.8 F | WEIGHT: 152 LBS | SYSTOLIC BLOOD PRESSURE: 83 MMHG | HEART RATE: 124 BPM | DIASTOLIC BLOOD PRESSURE: 59 MMHG | RESPIRATION RATE: 16 BRPM

## 2022-01-01 VITALS — SYSTOLIC BLOOD PRESSURE: 123 MMHG | DIASTOLIC BLOOD PRESSURE: 68 MMHG

## 2022-01-01 DIAGNOSIS — D63.1 ANEMIA IN STAGE 3 CHRONIC KIDNEY DISEASE, UNSPECIFIED WHETHER STAGE 3A OR 3B CKD (HCC): Primary | ICD-10-CM

## 2022-01-01 DIAGNOSIS — J43.2 CENTRILOBULAR EMPHYSEMA (HCC): Primary | ICD-10-CM

## 2022-01-01 DIAGNOSIS — N17.9 AKI (ACUTE KIDNEY INJURY) (HCC): ICD-10-CM

## 2022-01-01 DIAGNOSIS — T78.40XA HYPERSENSITIVITY REACTION, INITIAL ENCOUNTER: ICD-10-CM

## 2022-01-01 DIAGNOSIS — I50.9 CHF (CONGESTIVE HEART FAILURE) (HCC): Primary | ICD-10-CM

## 2022-01-01 DIAGNOSIS — I50.43 ACUTE ON CHRONIC COMBINED SYSTOLIC AND DIASTOLIC CONGESTIVE HEART FAILURE (HCC): ICD-10-CM

## 2022-01-01 DIAGNOSIS — I42.8 NICM (NONISCHEMIC CARDIOMYOPATHY) (HCC): ICD-10-CM

## 2022-01-01 DIAGNOSIS — J44.9 COPD, SEVERE (HCC): ICD-10-CM

## 2022-01-01 DIAGNOSIS — L30.9 DERMATITIS: ICD-10-CM

## 2022-01-01 DIAGNOSIS — N18.30 ANEMIA IN STAGE 3 CHRONIC KIDNEY DISEASE, UNSPECIFIED WHETHER STAGE 3A OR 3B CKD (HCC): Primary | ICD-10-CM

## 2022-01-01 DIAGNOSIS — J44.1 COPD EXACERBATION (HCC): ICD-10-CM

## 2022-01-01 LAB
2HR DELTA HS TROPONIN: -2 NG/L
4HR DELTA HS TROPONIN: -4 NG/L
ABO GROUP BLD BPU: NORMAL
ABO GROUP BLD: NORMAL
ABO GROUP BLD: NORMAL
ANION GAP SERPL CALCULATED.3IONS-SCNC: 20 MMOL/L (ref 4–13)
ANION GAP SERPL CALCULATED.3IONS-SCNC: 9 MMOL/L (ref 4–13)
AORTIC ROOT: 3.2 CM
APICAL FOUR CHAMBER EJECTION FRACTION: 46 %
APTT PPP: 33 SECONDS (ref 23–37)
APTT PPP: 67 SECONDS (ref 23–37)
APTT PPP: 80 SECONDS (ref 23–37)
ARTERIAL PATENCY WRIST A: ABNORMAL
ARTERIAL PATENCY WRIST A: ABNORMAL
ARTERIAL PATENCY WRIST A: YES
ATRIAL RATE: 115 BPM
AV LVOT MEAN GRADIENT: 1 MMHG
AV LVOT PEAK GRADIENT: 1 MMHG
BACTERIA UR QL AUTO: ABNORMAL /HPF
BASE EX.OXY STD BLDV CALC-SCNC: 71 % (ref 60–80)
BASE EXCESS BLDA CALC-SCNC: -10 MMOL/L (ref -2–3)
BASE EXCESS BLDA CALC-SCNC: -3 MMOL/L (ref -2–3)
BASE EXCESS BLDA CALC-SCNC: -4.2 MMOL/L
BASE EXCESS BLDV CALC-SCNC: -6.4 MMOL/L
BASOPHILS # BLD AUTO: 0 THOUSANDS/ΜL (ref 0–0.1)
BASOPHILS # BLD AUTO: 0.01 THOUSANDS/ΜL (ref 0–0.1)
BASOPHILS NFR BLD AUTO: 0 % (ref 0–1)
BASOPHILS NFR BLD AUTO: 0 % (ref 0–1)
BILIRUB UR QL STRIP: NEGATIVE
BLD GP AB SCN SERPL QL: NEGATIVE
BNP SERPL-MCNC: 5443 PG/ML (ref 0–100)
BPU ID: NORMAL
BUN SERPL-MCNC: 44 MG/DL (ref 5–25)
BUN SERPL-MCNC: 56 MG/DL (ref 5–25)
CA-I BLD-SCNC: 1.19 MMOL/L (ref 1.12–1.32)
CA-I BLD-SCNC: 1.21 MMOL/L (ref 1.12–1.32)
CALCIUM SERPL-MCNC: 8.9 MG/DL (ref 8.4–10.2)
CALCIUM SERPL-MCNC: 9 MG/DL (ref 8.4–10.2)
CARDIAC TROPONIN I PNL SERPL HS: 46 NG/L
CARDIAC TROPONIN I PNL SERPL HS: 48 NG/L
CARDIAC TROPONIN I PNL SERPL HS: 50 NG/L
CHLORIDE SERPL-SCNC: 107 MMOL/L (ref 96–108)
CHLORIDE SERPL-SCNC: 107 MMOL/L (ref 96–108)
CK SERPL-CCNC: 151 U/L (ref 39–308)
CLARITY UR: ABNORMAL
CO2 SERPL-SCNC: 13 MMOL/L (ref 21–32)
CO2 SERPL-SCNC: 26 MMOL/L (ref 21–32)
COLOR UR: YELLOW
CREAT SERPL-MCNC: 1.84 MG/DL (ref 0.6–1.3)
CREAT SERPL-MCNC: 2.54 MG/DL (ref 0.6–1.3)
CROSSMATCH: NORMAL
DOP CALC LVOT PEAK VEL VTI: 7.7 CM
DOP CALC LVOT PEAK VEL: 0.56 M/S
DS:DELIVERY SYSTEM: ABNORMAL
DS:DELIVERY SYSTEM: ABNORMAL
EOSINOPHIL # BLD AUTO: 0 THOUSAND/ΜL (ref 0–0.61)
EOSINOPHIL # BLD AUTO: 0.02 THOUSAND/ΜL (ref 0–0.61)
EOSINOPHIL NFR BLD AUTO: 0 % (ref 0–6)
EOSINOPHIL NFR BLD AUTO: 1 % (ref 0–6)
ERYTHROCYTE [DISTWIDTH] IN BLOOD BY AUTOMATED COUNT: 14.2 % (ref 11.6–15.1)
ERYTHROCYTE [DISTWIDTH] IN BLOOD BY AUTOMATED COUNT: 17.9 % (ref 11.6–15.1)
FIO2 GAS DIL.REBREATH: 33 L
FLUAV RNA RESP QL NAA+PROBE: NEGATIVE
FLUBV RNA RESP QL NAA+PROBE: NEGATIVE
FRACTIONAL SHORTENING: 11 % (ref 28–44)
GFR SERPL CREATININE-BSD FRML MDRD: 23 ML/MIN/1.73SQ M
GFR SERPL CREATININE-BSD FRML MDRD: 35 ML/MIN/1.73SQ M
GLUCOSE SERPL-MCNC: 109 MG/DL (ref 65–140)
GLUCOSE SERPL-MCNC: 119 MG/DL (ref 65–140)
GLUCOSE SERPL-MCNC: 133 MG/DL (ref 65–140)
GLUCOSE SERPL-MCNC: 152 MG/DL (ref 65–140)
GLUCOSE UR STRIP-MCNC: NEGATIVE MG/DL
HCO3 BLDA-SCNC: 16 MMOL/L (ref 22–28)
HCO3 BLDA-SCNC: 19.9 MMOL/L (ref 22–28)
HCO3 BLDA-SCNC: 21.5 MMOL/L (ref 22–28)
HCO3 BLDV-SCNC: 20 MMOL/L (ref 24–30)
HCT VFR BLD AUTO: 25.9 % (ref 36.5–49.3)
HCT VFR BLD AUTO: 28.8 % (ref 36.5–49.3)
HCT VFR BLD CALC: 24 % (ref 36.5–49.3)
HCT VFR BLD CALC: 30 % (ref 36.5–49.3)
HGB BLD-MCNC: 8.3 G/DL (ref 12–17)
HGB BLD-MCNC: 9.2 G/DL (ref 12–17)
HGB BLDA-MCNC: 10.2 G/DL (ref 12–17)
HGB BLDA-MCNC: 8.2 G/DL (ref 12–17)
HGB UR QL STRIP.AUTO: ABNORMAL
HYALINE CASTS #/AREA URNS LPF: ABNORMAL /LPF
IMM GRANULOCYTES # BLD AUTO: 0.04 THOUSAND/UL (ref 0–0.2)
IMM GRANULOCYTES # BLD AUTO: 0.05 THOUSAND/UL (ref 0–0.2)
IMM GRANULOCYTES NFR BLD AUTO: 1 % (ref 0–2)
IMM GRANULOCYTES NFR BLD AUTO: 1 % (ref 0–2)
INR PPP: 1.31 (ref 0.84–1.19)
INTERVENTRICULAR SEPTUM IN DIASTOLE (PARASTERNAL SHORT AXIS VIEW): 0.9 CM
INTERVENTRICULAR SEPTUM: 0.9 CM (ref 0.6–1.1)
KETONES UR STRIP-MCNC: NEGATIVE MG/DL
LAAS-AP2: 25.4 CM2
LAAS-AP4: 29.4 CM2
LACTATE SERPL-SCNC: 3.4 MMOL/L (ref 0.5–2)
LACTATE SERPL-SCNC: 4.5 MMOL/L (ref 0.5–2)
LACTATE SERPL-SCNC: 4.6 MMOL/L (ref 0.5–2)
LACTATE SERPL-SCNC: 7.3 MMOL/L (ref 0.5–2)
LEFT ATRIUM SIZE: 4.1 CM
LEFT INTERNAL DIMENSION IN SYSTOLE: 5.9 CM (ref 2.1–4)
LEFT VENTRICLE DIASTOLIC VOLUME (MOD BIPLANE): 229 ML
LEFT VENTRICLE SYSTOLIC VOLUME (MOD BIPLANE): 159 ML
LEFT VENTRICULAR INTERNAL DIMENSION IN DIASTOLE: 6.6 CM (ref 3.5–6)
LEFT VENTRICULAR POSTERIOR WALL IN END DIASTOLE: 0.9 CM
LEFT VENTRICULAR STROKE VOLUME: 50 ML
LEUKOCYTE ESTERASE UR QL STRIP: ABNORMAL
LV EF: 31 %
LVSV (TEICH): 50 ML
LYMPHOCYTES # BLD AUTO: 0.56 THOUSANDS/ΜL (ref 0.6–4.47)
LYMPHOCYTES # BLD AUTO: 0.71 THOUSANDS/ΜL (ref 0.6–4.47)
LYMPHOCYTES NFR BLD AUTO: 11 % (ref 14–44)
LYMPHOCYTES NFR BLD AUTO: 16 % (ref 14–44)
MAGNESIUM SERPL-MCNC: 2.4 MG/DL (ref 1.9–2.7)
MAGNESIUM SERPL-MCNC: 2.7 MG/DL (ref 1.9–2.7)
MCH RBC QN AUTO: 34.3 PG (ref 26.8–34.3)
MCH RBC QN AUTO: 35.6 PG (ref 26.8–34.3)
MCHC RBC AUTO-ENTMCNC: 31.9 G/DL (ref 31.4–37.4)
MCHC RBC AUTO-ENTMCNC: 32 G/DL (ref 31.4–37.4)
MCV RBC AUTO: 108 FL (ref 82–98)
MCV RBC AUTO: 111 FL (ref 82–98)
MITRAL REGURGITATION PEAK VELOCITY: 3.88 M/S
MITRAL VALVE MEAN INFLOW VELOCITY: 3.07 M/S
MITRAL VALVE REGURGITANT PEAK GRADIENT: 60 MMHG
MONOCYTES # BLD AUTO: 0.49 THOUSAND/ΜL (ref 0.17–1.22)
MONOCYTES # BLD AUTO: 0.5 THOUSAND/ΜL (ref 0.17–1.22)
MONOCYTES NFR BLD AUTO: 10 % (ref 4–12)
MONOCYTES NFR BLD AUTO: 11 % (ref 4–12)
MV EROA: 0.4 CM2
NASAL CANNULA: 3
NEUTROPHILS # BLD AUTO: 3.07 THOUSANDS/ΜL (ref 1.85–7.62)
NEUTROPHILS # BLD AUTO: 4.02 THOUSANDS/ΜL (ref 1.85–7.62)
NEUTS SEG NFR BLD AUTO: 71 % (ref 43–75)
NEUTS SEG NFR BLD AUTO: 78 % (ref 43–75)
NITRITE UR QL STRIP: NEGATIVE
NON-SQ EPI CELLS URNS QL MICRO: ABNORMAL /HPF
NRBC BLD AUTO-RTO: 1 /100 WBCS
NRBC BLD AUTO-RTO: 2 /100 WBCS
O2 CT BLDA-SCNC: 22.6 ML/DL (ref 16–23)
O2 CT BLDV-SCNC: 11.3 ML/DL
OXYHGB MFR BLDA: 97.2 % (ref 94–97)
PA SYSTOLIC PRESSURE: 40 MMHG
PCO2 BLD: 17 MMOL/L (ref 21–32)
PCO2 BLD: 23 MMOL/L (ref 21–32)
PCO2 BLD: 33.5 MM HG (ref 36–44)
PCO2 BLD: 33.6 MM HG (ref 36–44)
PCO2 BLDA: 34.1 MM HG (ref 36–44)
PCO2 BLDV: 43.5 MM HG (ref 42–50)
PH BLD: 7.29 [PH] (ref 7.35–7.45)
PH BLD: 7.41 [PH] (ref 7.35–7.45)
PH BLDA: 7.38 [PH] (ref 7.35–7.45)
PH BLDV: 7.28 [PH] (ref 7.3–7.4)
PH UR STRIP.AUTO: 5 [PH]
PISA MRMAX VEL: 0.6 M/S
PISA RADIUS: 0.6 CM
PLATELET # BLD AUTO: 51 THOUSANDS/UL (ref 149–390)
PLATELET # BLD AUTO: 58 THOUSANDS/UL (ref 149–390)
PMV BLD AUTO: 11.8 FL (ref 8.9–12.7)
PMV BLD AUTO: 12.6 FL (ref 8.9–12.7)
PO2 BLD: 82 MM HG (ref 75–129)
PO2 BLD: 92 MM HG (ref 75–129)
PO2 BLDA: 109.7 MM HG (ref 75–129)
PO2 BLDV: 44.4 MM HG (ref 35–45)
POTASSIUM BLD-SCNC: 4.2 MMOL/L (ref 3.5–5.3)
POTASSIUM BLD-SCNC: 5.3 MMOL/L (ref 3.5–5.3)
POTASSIUM SERPL-SCNC: 4.7 MMOL/L (ref 3.5–5.3)
POTASSIUM SERPL-SCNC: 5.1 MMOL/L (ref 3.5–5.3)
PROT UR STRIP-MCNC: ABNORMAL MG/DL
PROTHROMBIN TIME: 16.5 SECONDS (ref 11.6–14.5)
QRS AXIS: 20 DEGREES
QRSD INTERVAL: 158 MS
QT INTERVAL: 318 MS
QTC INTERVAL: 436 MS
RBC # BLD AUTO: 2.33 MILLION/UL (ref 3.88–5.62)
RBC # BLD AUTO: 2.68 MILLION/UL (ref 3.88–5.62)
RBC #/AREA URNS AUTO: ABNORMAL /HPF
RH BLD: POSITIVE
RH BLD: POSITIVE
RIGHT ATRIUM AREA SYSTOLE A4C: 31.4 CM2
RIGHT VENTRICLE ID DIMENSION: 5.7 CM
RSV RNA RESP QL NAA+PROBE: NEGATIVE
SAMPLE SITE: ABNORMAL
SAMPLE SITE: ABNORMAL
SAO2 % BLD FROM PO2: 96 % (ref 60–85)
SAO2 % BLD FROM PO2: 96 % (ref 60–85)
SARS-COV-2 RNA RESP QL NAA+PROBE: NEGATIVE
SL CV DOP CALC MV VTI RETROGRADE: 122.8 CM
SL CV LEFT ATRIUM LENGTH A2C: 6.1 CM
SL CV LV EF: 25
SL CV MV MEAN GRADIENT RETROGRADE: 41 MMHG
SL CV PED ECHO LEFT VENTRICLE DIASTOLIC VOLUME (MOD BIPLANE) 2D: 226 ML
SL CV PED ECHO LEFT VENTRICLE SYSTOLIC VOLUME (MOD BIPLANE) 2D: 176 ML
SODIUM BLD-SCNC: 140 MMOL/L (ref 136–145)
SODIUM BLD-SCNC: 141 MMOL/L (ref 136–145)
SODIUM SERPL-SCNC: 140 MMOL/L (ref 135–147)
SODIUM SERPL-SCNC: 142 MMOL/L (ref 135–147)
SP GR UR STRIP.AUTO: 1.01 (ref 1–1.03)
SPECIMEN EXPIRATION DATE: NORMAL
SPECIMEN SOURCE: ABNORMAL
T WAVE AXIS: 195 DEGREES
TR MAX PG: 38 MMHG
TR PEAK VELOCITY: 3.1 M/S
TRICUSPID VALVE PEAK REGURGITATION VELOCITY: 3.07 M/S
TSH SERPL DL<=0.05 MIU/L-ACNC: 1.51 UIU/ML (ref 0.45–4.5)
UNIT DISPENSE STATUS: NORMAL
UNIT PRODUCT CODE: NORMAL
UNIT PRODUCT VOLUME: 350 ML
UNIT RH: NORMAL
UROBILINOGEN UR STRIP-ACNC: 2 MG/DL
VENTRICULAR RATE: 113 BPM
WBC # BLD AUTO: 4.34 THOUSAND/UL (ref 4.31–10.16)
WBC # BLD AUTO: 5.13 THOUSAND/UL (ref 4.31–10.16)
WBC #/AREA URNS AUTO: ABNORMAL /HPF

## 2022-01-01 PROCEDURE — 82805 BLOOD GASES W/O2 SATURATION: CPT | Performed by: PHYSICIAN ASSISTANT

## 2022-01-01 PROCEDURE — 84484 ASSAY OF TROPONIN QUANT: CPT | Performed by: PHYSICIAN ASSISTANT

## 2022-01-01 PROCEDURE — 85730 THROMBOPLASTIN TIME PARTIAL: CPT | Performed by: PHYSICIAN ASSISTANT

## 2022-01-01 PROCEDURE — 86850 RBC ANTIBODY SCREEN: CPT | Performed by: PHYSICIAN ASSISTANT

## 2022-01-01 PROCEDURE — 83605 ASSAY OF LACTIC ACID: CPT | Performed by: INTERNAL MEDICINE

## 2022-01-01 PROCEDURE — 99232 SBSQ HOSP IP/OBS MODERATE 35: CPT | Performed by: INTERNAL MEDICINE

## 2022-01-01 PROCEDURE — 82803 BLOOD GASES ANY COMBINATION: CPT

## 2022-01-01 PROCEDURE — 83735 ASSAY OF MAGNESIUM: CPT | Performed by: PHYSICIAN ASSISTANT

## 2022-01-01 PROCEDURE — 82947 ASSAY GLUCOSE BLOOD QUANT: CPT

## 2022-01-01 PROCEDURE — 85025 COMPLETE CBC W/AUTO DIFF WBC: CPT | Performed by: PHYSICIAN ASSISTANT

## 2022-01-01 PROCEDURE — 71045 X-RAY EXAM CHEST 1 VIEW: CPT

## 2022-01-01 PROCEDURE — 94760 N-INVAS EAR/PLS OXIMETRY 1: CPT

## 2022-01-01 PROCEDURE — 85014 HEMATOCRIT: CPT

## 2022-01-01 PROCEDURE — 83735 ASSAY OF MAGNESIUM: CPT

## 2022-01-01 PROCEDURE — 86901 BLOOD TYPING SEROLOGIC RH(D): CPT | Performed by: PHYSICIAN ASSISTANT

## 2022-01-01 PROCEDURE — 96365 THER/PROPH/DIAG IV INF INIT: CPT

## 2022-01-01 PROCEDURE — 84295 ASSAY OF SERUM SODIUM: CPT

## 2022-01-01 PROCEDURE — C8929 TTE W OR WO FOL WCON,DOPPLER: HCPCS

## 2022-01-01 PROCEDURE — 86900 BLOOD TYPING SEROLOGIC ABO: CPT | Performed by: PHYSICIAN ASSISTANT

## 2022-01-01 PROCEDURE — 99222 1ST HOSP IP/OBS MODERATE 55: CPT | Performed by: NURSE PRACTITIONER

## 2022-01-01 PROCEDURE — 94664 DEMO&/EVAL PT USE INHALER: CPT

## 2022-01-01 PROCEDURE — 99223 1ST HOSP IP/OBS HIGH 75: CPT | Performed by: INTERNAL MEDICINE

## 2022-01-01 PROCEDURE — 81001 URINALYSIS AUTO W/SCOPE: CPT | Performed by: INTERNAL MEDICINE

## 2022-01-01 PROCEDURE — 96372 THER/PROPH/DIAG INJ SC/IM: CPT

## 2022-01-01 PROCEDURE — 80048 BASIC METABOLIC PNL TOTAL CA: CPT

## 2022-01-01 PROCEDURE — 36600 WITHDRAWAL OF ARTERIAL BLOOD: CPT

## 2022-01-01 PROCEDURE — 36415 COLL VENOUS BLD VENIPUNCTURE: CPT | Performed by: PHYSICIAN ASSISTANT

## 2022-01-01 PROCEDURE — 83880 ASSAY OF NATRIURETIC PEPTIDE: CPT | Performed by: PHYSICIAN ASSISTANT

## 2022-01-01 PROCEDURE — 85730 THROMBOPLASTIN TIME PARTIAL: CPT | Performed by: INTERNAL MEDICINE

## 2022-01-01 PROCEDURE — 96366 THER/PROPH/DIAG IV INF ADDON: CPT

## 2022-01-01 PROCEDURE — 84132 ASSAY OF SERUM POTASSIUM: CPT

## 2022-01-01 PROCEDURE — 99285 EMERGENCY DEPT VISIT HI MDM: CPT

## 2022-01-01 PROCEDURE — 93005 ELECTROCARDIOGRAM TRACING: CPT

## 2022-01-01 PROCEDURE — 85610 PROTHROMBIN TIME: CPT | Performed by: PHYSICIAN ASSISTANT

## 2022-01-01 PROCEDURE — 82330 ASSAY OF CALCIUM: CPT

## 2022-01-01 PROCEDURE — 0241U HB NFCT DS VIR RESP RNA 4 TRGT: CPT | Performed by: PHYSICIAN ASSISTANT

## 2022-01-01 PROCEDURE — 94002 VENT MGMT INPAT INIT DAY: CPT

## 2022-01-01 PROCEDURE — 80048 BASIC METABOLIC PNL TOTAL CA: CPT | Performed by: PHYSICIAN ASSISTANT

## 2022-01-01 PROCEDURE — 93010 ELECTROCARDIOGRAM REPORT: CPT | Performed by: INTERNAL MEDICINE

## 2022-01-01 PROCEDURE — P9040 RBC LEUKOREDUCED IRRADIATED: HCPCS

## 2022-01-01 PROCEDURE — 84443 ASSAY THYROID STIM HORMONE: CPT | Performed by: PHYSICIAN ASSISTANT

## 2022-01-01 PROCEDURE — 85025 COMPLETE CBC W/AUTO DIFF WBC: CPT | Performed by: INTERNAL MEDICINE

## 2022-01-01 PROCEDURE — 93306 TTE W/DOPPLER COMPLETE: CPT | Performed by: INTERNAL MEDICINE

## 2022-01-01 PROCEDURE — 76770 US EXAM ABDO BACK WALL COMP: CPT

## 2022-01-01 PROCEDURE — 94640 AIRWAY INHALATION TREATMENT: CPT

## 2022-01-01 PROCEDURE — 99285 EMERGENCY DEPT VISIT HI MDM: CPT | Performed by: PHYSICIAN ASSISTANT

## 2022-01-01 PROCEDURE — 82550 ASSAY OF CK (CPK): CPT | Performed by: INTERNAL MEDICINE

## 2022-01-01 PROCEDURE — 82805 BLOOD GASES W/O2 SATURATION: CPT | Performed by: INTERNAL MEDICINE

## 2022-01-01 PROCEDURE — 86920 COMPATIBILITY TEST SPIN: CPT

## 2022-01-01 RX ORDER — ALBUTEROL SULFATE 90 UG/1
1 AEROSOL, METERED RESPIRATORY (INHALATION) 4 TIMES DAILY
Status: DISCONTINUED | OUTPATIENT
Start: 2022-01-01 | End: 2022-01-01

## 2022-01-01 RX ORDER — METOPROLOL TARTRATE 5 MG/5ML
5 INJECTION INTRAVENOUS ONCE
Status: COMPLETED | OUTPATIENT
Start: 2022-01-01 | End: 2022-01-01

## 2022-01-01 RX ORDER — HEPARIN SODIUM 10000 [USP'U]/100ML
3-20 INJECTION, SOLUTION INTRAVENOUS
Status: DISCONTINUED | OUTPATIENT
Start: 2022-01-01 | End: 2022-01-01

## 2022-01-01 RX ORDER — METOPROLOL SUCCINATE 25 MG/1
25 TABLET, EXTENDED RELEASE ORAL DAILY
Status: CANCELLED | OUTPATIENT
Start: 2022-01-01

## 2022-01-01 RX ORDER — DIPHENHYDRAMINE HYDROCHLORIDE 50 MG/ML
12.5 INJECTION INTRAMUSCULAR; INTRAVENOUS ONCE
Status: COMPLETED | OUTPATIENT
Start: 2022-01-01 | End: 2022-01-01

## 2022-01-01 RX ORDER — LORAZEPAM 2 MG/ML
1 INJECTION INTRAMUSCULAR EVERY 2 HOUR PRN
Status: DISCONTINUED | OUTPATIENT
Start: 2022-01-01 | End: 2022-09-25 | Stop reason: HOSPADM

## 2022-01-01 RX ORDER — HEPARIN SODIUM 1000 [USP'U]/ML
1950 INJECTION, SOLUTION INTRAVENOUS; SUBCUTANEOUS
Status: DISCONTINUED | OUTPATIENT
Start: 2022-01-01 | End: 2022-01-01

## 2022-01-01 RX ORDER — HEPARIN SODIUM 1000 [USP'U]/ML
3900 INJECTION, SOLUTION INTRAVENOUS; SUBCUTANEOUS ONCE
Status: COMPLETED | OUTPATIENT
Start: 2022-01-01 | End: 2022-01-01

## 2022-01-01 RX ORDER — FUROSEMIDE 10 MG/ML
20 INJECTION INTRAMUSCULAR; INTRAVENOUS ONCE
Status: DISCONTINUED | OUTPATIENT
Start: 2022-01-01 | End: 2022-01-01

## 2022-01-01 RX ORDER — HEPARIN SODIUM 1000 [USP'U]/ML
3900 INJECTION, SOLUTION INTRAVENOUS; SUBCUTANEOUS
Status: DISCONTINUED | OUTPATIENT
Start: 2022-01-01 | End: 2022-01-01

## 2022-01-01 RX ORDER — FINASTERIDE 5 MG/1
5 TABLET, FILM COATED ORAL DAILY
Status: DISCONTINUED | OUTPATIENT
Start: 2022-01-01 | End: 2022-01-01

## 2022-01-01 RX ORDER — BENZONATATE 100 MG/1
100 CAPSULE ORAL 3 TIMES DAILY PRN
Status: DISCONTINUED | OUTPATIENT
Start: 2022-01-01 | End: 2022-01-01

## 2022-01-01 RX ORDER — SODIUM BICARBONATE 650 MG/1
1300 TABLET ORAL
Status: DISCONTINUED | OUTPATIENT
Start: 2022-01-01 | End: 2022-01-01

## 2022-01-01 RX ORDER — IPRATROPIUM BROMIDE AND ALBUTEROL SULFATE 2.5; .5 MG/3ML; MG/3ML
3 SOLUTION RESPIRATORY (INHALATION)
Status: DISCONTINUED | OUTPATIENT
Start: 2022-01-01 | End: 2022-01-01

## 2022-01-01 RX ORDER — FUROSEMIDE 10 MG/ML
40 INJECTION INTRAMUSCULAR; INTRAVENOUS ONCE
Status: COMPLETED | OUTPATIENT
Start: 2022-01-01 | End: 2022-01-01

## 2022-01-01 RX ORDER — ALBUTEROL SULFATE 2.5 MG/3ML
5 SOLUTION RESPIRATORY (INHALATION) ONCE
Status: COMPLETED | OUTPATIENT
Start: 2022-01-01 | End: 2022-01-01

## 2022-01-01 RX ORDER — ASPIRIN 81 MG/1
81 TABLET ORAL DAILY
Status: DISCONTINUED | OUTPATIENT
Start: 2022-01-01 | End: 2022-01-01

## 2022-01-01 RX ORDER — FUROSEMIDE 10 MG/ML
40 INJECTION INTRAMUSCULAR; INTRAVENOUS
Status: DISCONTINUED | OUTPATIENT
Start: 2022-01-01 | End: 2022-01-01

## 2022-01-01 RX ORDER — METOPROLOL TARTRATE 5 MG/5ML
5 INJECTION INTRAVENOUS EVERY 6 HOURS PRN
Status: CANCELLED | OUTPATIENT
Start: 2022-01-01

## 2022-01-01 RX ORDER — ALBUTEROL SULFATE 90 UG/1
1 AEROSOL, METERED RESPIRATORY (INHALATION) EVERY 4 HOURS PRN
Status: DISCONTINUED | OUTPATIENT
Start: 2022-01-01 | End: 2022-01-01

## 2022-01-01 RX ORDER — FUROSEMIDE 10 MG/ML
80 INJECTION INTRAMUSCULAR; INTRAVENOUS
Status: DISCONTINUED | OUTPATIENT
Start: 2022-01-01 | End: 2022-01-01

## 2022-01-01 RX ORDER — FAMOTIDINE 10 MG/ML
20 INJECTION, SOLUTION INTRAVENOUS ONCE
Status: COMPLETED | OUTPATIENT
Start: 2022-01-01 | End: 2022-01-01

## 2022-01-01 RX ADMIN — ALBUTEROL SULFATE 5 MG: 2.5 SOLUTION RESPIRATORY (INHALATION) at 12:30

## 2022-01-01 RX ADMIN — PERFLUTREN 0.6 ML/MIN: 6.52 INJECTION, SUSPENSION INTRAVENOUS at 14:15

## 2022-01-01 RX ADMIN — METOPROLOL TARTRATE 25 MG: 25 TABLET, FILM COATED ORAL at 21:19

## 2022-01-01 RX ADMIN — MORPHINE SULFATE 2 MG: 2 INJECTION, SOLUTION INTRAMUSCULAR; INTRAVENOUS at 11:13

## 2022-01-01 RX ADMIN — DARBEPOETIN ALFA 100 MCG: 100 INJECTION, SOLUTION INTRAVENOUS; SUBCUTANEOUS at 14:40

## 2022-01-01 RX ADMIN — LORAZEPAM 1 MG: 2 INJECTION INTRAMUSCULAR; INTRAVENOUS at 01:09

## 2022-01-01 RX ADMIN — ALBUTEROL SULFATE 5 MG: 2.5 SOLUTION RESPIRATORY (INHALATION) at 15:58

## 2022-01-01 RX ADMIN — DIPHENHYDRAMINE HYDROCHLORIDE 12.5 MG: 50 INJECTION, SOLUTION INTRAMUSCULAR; INTRAVENOUS at 13:14

## 2022-01-01 RX ADMIN — FUROSEMIDE 40 MG: 10 INJECTION, SOLUTION INTRAMUSCULAR; INTRAVENOUS at 17:50

## 2022-01-01 RX ADMIN — FINASTERIDE 5 MG: 5 TABLET, FILM COATED ORAL at 08:07

## 2022-01-01 RX ADMIN — METOPROLOL TARTRATE 5 MG: 5 INJECTION, SOLUTION INTRAVENOUS at 15:09

## 2022-01-01 RX ADMIN — MORPHINE SULFATE 2 MG: 2 INJECTION, SOLUTION INTRAMUSCULAR; INTRAVENOUS at 05:58

## 2022-01-01 RX ADMIN — FAMOTIDINE 20 MG: 10 INJECTION, SOLUTION INTRAVENOUS at 14:32

## 2022-01-01 RX ADMIN — DILTIAZEM HYDROCHLORIDE 5 MG/HR: 5 INJECTION INTRAVENOUS at 11:02

## 2022-01-01 RX ADMIN — Medication 12.5 MG: at 17:45

## 2022-01-01 RX ADMIN — Medication 12.5 MG: at 22:20

## 2022-01-01 RX ADMIN — HEPARIN SODIUM 12 UNITS/KG/HR: 10000 INJECTION, SOLUTION INTRAVENOUS at 16:30

## 2022-01-01 RX ADMIN — MORPHINE SULFATE 2 MG: 2 INJECTION, SOLUTION INTRAMUSCULAR; INTRAVENOUS at 14:13

## 2022-01-01 RX ADMIN — HEPARIN SODIUM 3900 UNITS: 1000 INJECTION INTRAVENOUS; SUBCUTANEOUS at 16:30

## 2022-01-01 RX ADMIN — IPRATROPIUM BROMIDE 0.5 MG: 0.5 SOLUTION RESPIRATORY (INHALATION) at 15:58

## 2022-01-01 RX ADMIN — FUROSEMIDE 40 MG: 10 INJECTION, SOLUTION INTRAMUSCULAR; INTRAVENOUS at 08:07

## 2022-01-01 RX ADMIN — Medication 12.5 MG: at 08:07

## 2022-01-01 RX ADMIN — ASPIRIN 81 MG: 81 TABLET, COATED ORAL at 08:07

## 2022-01-01 RX ADMIN — IPRATROPIUM BROMIDE 0.5 MG: 0.5 SOLUTION RESPIRATORY (INHALATION) at 12:30

## 2022-01-01 RX ADMIN — PREDNISONE 50 MG: 20 TABLET ORAL at 13:14

## 2022-01-13 ENCOUNTER — TELEPHONE (OUTPATIENT)
Dept: HEMATOLOGY ONCOLOGY | Facility: CLINIC | Age: 77
End: 2022-01-13

## 2022-01-13 NOTE — TELEPHONE ENCOUNTER
Left a voice message for patient to call us to Reschedule his [place] [facility] appointment to either a new provider or follow Rocío Coburn

## 2022-01-18 NOTE — TELEPHONE ENCOUNTER
Patient's wife returned call  They want to stay at [place] location- Rescheduled patient to see Christine Dos Santos on 2/18

## 2022-01-19 ENCOUNTER — APPOINTMENT (OUTPATIENT)
Dept: LAB | Facility: CLINIC | Age: 77
End: 2022-01-19
Payer: MEDICARE

## 2022-01-19 DIAGNOSIS — Z12.5 SCREENING FOR PROSTATE CANCER: ICD-10-CM

## 2022-01-21 ENCOUNTER — TELEPHONE (OUTPATIENT)
Dept: HEMATOLOGY ONCOLOGY | Facility: CLINIC | Age: 77
End: 2022-01-21

## 2022-01-21 NOTE — TELEPHONE ENCOUNTER
Patient's wife returned call  I informed her that his creatinine is 2  11  Technically, this is an MARLENI since his previous creatinine was 1 63  Patient's wife states that he is not in taking enough fluids  I informed her that he needs to call his PCP regarding this for further management  For now he will work on fluid intake  Patient's wife appreciated call

## 2022-01-21 NOTE — TELEPHONE ENCOUNTER
Called patient to discuss findings of elevated creatinine at 2 11  No answer  Left voice message to return call

## 2022-02-06 DIAGNOSIS — J44.9 COPD, SEVERE (HCC): ICD-10-CM

## 2022-02-06 RX ORDER — UMECLIDINIUM 62.5 UG/1
AEROSOL, POWDER ORAL
Qty: 30 BLISTER | Refills: 3 | Status: SHIPPED | OUTPATIENT
Start: 2022-02-06

## 2022-02-17 ENCOUNTER — TELEPHONE (OUTPATIENT)
Dept: SURGICAL ONCOLOGY | Facility: CLINIC | Age: 77
End: 2022-02-17

## 2022-02-17 ENCOUNTER — TELEPHONE (OUTPATIENT)
Dept: HEMATOLOGY ONCOLOGY | Facility: CLINIC | Age: 77
End: 2022-02-17

## 2022-02-17 NOTE — TELEPHONE ENCOUNTER
Elmer's wife Oletta Moritz called in to confirm new appt on 4/1 at 1pm with Obed Lobo PA-C  She was agreeable

## 2022-02-20 DIAGNOSIS — I42.8 NICM (NONISCHEMIC CARDIOMYOPATHY) (HCC): ICD-10-CM

## 2022-02-20 RX ORDER — SACUBITRIL AND VALSARTAN 97; 103 MG/1; MG/1
TABLET, FILM COATED ORAL
Qty: 180 TABLET | Refills: 3 | Status: SHIPPED | OUTPATIENT
Start: 2022-02-20 | End: 2022-07-14

## 2022-03-16 ENCOUNTER — OFFICE VISIT (OUTPATIENT)
Dept: PULMONOLOGY | Facility: CLINIC | Age: 77
End: 2022-03-16
Payer: MEDICARE

## 2022-03-16 VITALS
TEMPERATURE: 95.5 F | HEART RATE: 88 BPM | SYSTOLIC BLOOD PRESSURE: 116 MMHG | WEIGHT: 151 LBS | OXYGEN SATURATION: 94 % | DIASTOLIC BLOOD PRESSURE: 66 MMHG | BODY MASS INDEX: 22.88 KG/M2 | HEIGHT: 68 IN

## 2022-03-16 DIAGNOSIS — R06.00 DYSPNEA ON EXERTION: ICD-10-CM

## 2022-03-16 DIAGNOSIS — I42.8 NICM (NONISCHEMIC CARDIOMYOPATHY) (HCC): ICD-10-CM

## 2022-03-16 DIAGNOSIS — J44.1 CHRONIC OBSTRUCTIVE PULMONARY DISEASE WITH ACUTE EXACERBATION (HCC): Primary | ICD-10-CM

## 2022-03-16 DIAGNOSIS — Z72.0 TOBACCO ABUSE: ICD-10-CM

## 2022-03-16 DIAGNOSIS — R47.01 APHASIA: ICD-10-CM

## 2022-03-16 PROCEDURE — 99214 OFFICE O/P EST MOD 30 MIN: CPT | Performed by: INTERNAL MEDICINE

## 2022-03-16 RX ORDER — PREDNISONE 20 MG/1
40 TABLET ORAL DAILY
Qty: 10 TABLET | Refills: 0 | Status: SHIPPED | OUTPATIENT
Start: 2022-03-16 | End: 2022-03-16

## 2022-03-16 RX ORDER — FLUTICASONE FUROATE, UMECLIDINIUM BROMIDE AND VILANTEROL TRIFENATATE 100; 62.5; 25 UG/1; UG/1; UG/1
1 POWDER RESPIRATORY (INHALATION) DAILY
Qty: 28 BLISTER | Refills: 0 | Status: SHIPPED | COMMUNITY
Start: 2022-03-16 | End: 2022-07-11

## 2022-03-16 RX ORDER — PREDNISONE 20 MG/1
40 TABLET ORAL DAILY
Qty: 10 TABLET | Refills: 0 | Status: SHIPPED | OUTPATIENT
Start: 2022-03-16 | End: 2022-03-21

## 2022-03-16 NOTE — PATIENT INSTRUCTIONS
· Stop incruse x 14 days  · Start Trelegy 1puff daily x 14 days once completed resume Incruse  · Start prednisone 40mg daily x 5 days  · If symptoms worsen or don't improve, call for repeat evaluation    Repeat CT in May 2022  Follow up in 3 months

## 2022-03-16 NOTE — PROGRESS NOTES
Pulmonary Follow Up Note   Zachery Putnam 78 y o  male MRN: [id number]  3/16/2022      Assessment:  1  Dyspnea on Exertion   · Encouraged regular attempts at exercise/walking  · Consider pulmonary rehab in the future     2  Severe COPD with acute exacerbation  · GOLD Stage III, Class C - FEV1 1 06L 35% 10/2018  · Evidence of air trapping on PFTs  · Start prednisone 40mg daily x 5 days  · Hold Incruse x 2 weeks then resume  · In the interim use Trelegy 100mcg 1puff daily - samples given and logged into registry  · Flu Vaccine 2021 pneumovax 2017, prevnar 2015, COVID-19 x 3  · Follow up in 3 months or sooner as needed     3  Pulmonary Nodule  · Multiple risk factors for malignancy  · CT/PET with minimal FDG avidity - SUV 1 4  · Decreased size on repeat CT Chest (July 2019) and stable on 7/2020 and resolved on May 2021  · Plan to transition to LDCT chest annually - Due May 2022     4  Nicotine dependence in sustained remission  · Remain tobacco free, LDCT annually     5  Non-ischemic Cardiomyopathy EF 20-25% - appears euvolemic     6  Expressive Aphasia - further care per neurology, no sig improvement with speech therapy     7  Pancytopenia - now diagnosed with aplastic anemia and follows with hematology    Plan:    Diagnoses and all orders for this visit:    Chronic obstructive pulmonary disease with acute exacerbation ([facility] [address] )  -     fluticasone-umeclidinium-vilanterol (Trelegy Ellipta) 100-62 5-25 MCG/INH inhaler; Inhale 1 puff daily for 14 days Rinse mouth after use  -     Discontinue: predniSONE 20 mg tablet; Take 2 tablets (40 mg total) by mouth daily for 5 days  -     predniSONE 20 mg tablet; Take 2 tablets (40 mg total) by mouth daily for 5 days    Dyspnea on exertion    Tobacco abuse in remission    Aphasia    NICM (nonischemic cardiomyopathy) ([facility] [address] )        Return in about 3 months (around 6/16/2022) for Recheck      History of Present Illness   HPI:  Zachery Putnam is a 78 y o  male who presented for dyspnea and COPD evaluation by Dr Cristina Watkins  He has history of NICM EF 10-20% and HTN    He was diagnosed with aplastic anemia in 2021 and on observation status  He was initially seen in Jan 2019 and diagnosed with severe COPD and did not require supplemental oxygen  He was last seen in September 2021  He presents today for follow up with his wife      He has noted some increased dyspnea but is unclear of time  He notes using CLAYTON at least daily  He has nonpurulent sputum production  Noted wheeze and increased dyspnea with cold air  He denied fevers or chills  Reports hip pain but no falls or trauma, improving with some APAP  Denied orthopnea, no LE edema  Review of Systems   Constitutional: Negative for activity change, appetite change, fatigue and fever  HENT: Positive for postnasal drip, rhinorrhea and sneezing  Negative for ear pain, sore throat and trouble swallowing  Respiratory: Positive for cough, shortness of breath and wheezing  Cardiovascular: Negative for chest pain, palpitations and leg swelling  Gastrointestinal: Negative for abdominal pain, nausea and vomiting  Endocrine: Positive for cold intolerance  Musculoskeletal: Positive for arthralgias  Negative for gait problem and myalgias  Neurological: Negative for headaches  Psychiatric/Behavioral: Negative for sleep disturbance  The patient is not nervous/anxious          Historical Information   Past Medical History:   Diagnosis Date    Cardiomyopathy [facility])     CHF (congestive heart failure) (HCC)     COPD (chronic obstructive pulmonary disease) (HCC)     Dyspnea on exertion     Essential hypertension     Non-ischemic cardiomyopathy (HCC)      Past Surgical History:   Procedure Laterality Date    IR BIOPSY BONE MARROW  5/26/2021    PILONIDAL CYST EXCISION       Family History   Problem Relation Age of Onset    Breast cancer Mother 55    Hypertension Father     Cancer Father     Cancer Family     Heart disease Maternal Uncle  Coronary artery disease Maternal Uncle     Heart failure Maternal Uncle     Breast cancer Sister 48    Heart attack Neg Hx     Stroke Neg Hx     Anuerysm Neg Hx     Arrhythmia Neg Hx     Clotting disorder Neg Hx     Hyperlipidemia Neg Hx          Meds/Allergies     Current Outpatient Medications:     aspirin (ECOTRIN LOW STRENGTH) 81 mg EC tablet, Take 81 mg by mouth daily, Disp: , Rfl:     atorvastatin (LIPITOR) 20 mg tablet, TAKE 1 TABLET BY MOUTH IN  THE EVENING, Disp: 90 tablet, Rfl: 3    bisoprolol (ZEBETA) 5 mg tablet, Take 0 5 tablets (2 5 mg total) by mouth 2 (two) times a day, Disp: 90 tablet, Rfl: 3    Entresto  MG TABS, TAKE 1 TABLET BY MOUTH  TWICE DAILY, Disp: 180 tablet, Rfl: 3    finasteride (PROSCAR) 5 mg tablet, Take 1 tablet (5 mg total) by mouth daily, Disp: 90 tablet, Rfl: 2    furosemide (LASIX) 20 mg tablet, TAKE 2 TABLETS BY MOUTH  EVERY MORNING AND 1 TABLET  BY MOUTH IN THE AFTERNOON  FOR TOTAL OF 60MG PER DAY, Disp: 270 tablet, Rfl: 3    Incruse Ellipta 62 5 MCG/INH AEPB inhaler, USE 1 INHALATION BY MOUTH  DAILY, Disp: 30 blister, Rfl: 3    Ventolin  (90 Base) MCG/ACT inhaler, USE 2 INHALATIONS BY MOUTH  EVERY 6 HOURS AS NEEDED FOR WHEEZING, Disp: 72 g, Rfl: 3    fluticasone-umeclidinium-vilanterol (Trelegy Ellipta) 100-62 5-25 MCG/INH inhaler, Inhale 1 puff daily for 14 days Rinse mouth after use , Disp: 28 blister, Rfl: 0    predniSONE 20 mg tablet, Take 2 tablets (40 mg total) by mouth daily for 5 days, Disp: 10 tablet, Rfl: 0  No Known Allergies    Vitals: Blood pressure 116/66, pulse 88, temperature (!) 95 5 °F (35 3 °C), temperature source Tympanic, height 5' 8" (1 727 m), weight 68 5 kg (151 lb), SpO2 94 %  Body mass index is 22 96 kg/m²  Oxygen Therapy  SpO2: 94 %  Oxygen Therapy: None (Room air)      Physical Exam  Physical Exam  Vitals reviewed  Constitutional:       General: He is not in acute distress  Appearance: Normal appearance   He is well-developed and normal weight  He is not ill-appearing, toxic-appearing or diaphoretic  HENT:      Head: Normocephalic and atraumatic  Right Ear: External ear normal       Left Ear: External ear normal       Nose: Nose normal       Mouth/Throat:      Mouth: Mucous membranes are moist       Pharynx: Oropharynx is clear  No oropharyngeal exudate  Eyes:      General: No scleral icterus  Right eye: No discharge  Left eye: No discharge  Conjunctiva/sclera: Conjunctivae normal       Pupils: Pupils are equal, round, and reactive to light  Neck:      Vascular: No JVD  Trachea: No tracheal deviation  Cardiovascular:      Rate and Rhythm: Normal rate and regular rhythm  Heart sounds: Normal heart sounds  No murmur heard  Pulmonary:      Effort: Pulmonary effort is normal  No respiratory distress  Breath sounds: No stridor  Wheezing present  No rhonchi or rales  Comments: Mild scattered bilateral wheeze  Abdominal:      General: Bowel sounds are normal  There is no distension  Palpations: Abdomen is soft  Tenderness: There is no abdominal tenderness  There is no guarding  Musculoskeletal:         General: No swelling or deformity  Cervical back: Neck supple  Right lower leg: No edema  Left lower leg: No edema  Lymphadenopathy:      Cervical: No cervical adenopathy  Skin:     General: Skin is warm and dry  Findings: No rash  Neurological:      Mental Status: He is alert and oriented to person, place, and time  Mental status is at baseline  Comments: Noted expressive aphasia   Psychiatric:         Mood and Affect: Mood normal          Behavior: Behavior normal          Thought Content: Thought content normal          Labs: I have personally reviewed pertinent lab results    Lab Results   Component Value Date    WBC 3 45 (L) 01/19/2022    HGB 8 8 (L) 01/19/2022    HCT 27 7 (L) 01/19/2022     (H) 01/19/2022    PLT 72 (L) 01/19/2022     Lab Results   Component Value Date    GLUCOSE 100 10/22/2015    CALCIUM 9 3 01/19/2022     10/22/2015    K 4 5 01/19/2022    CO2 24 01/19/2022     01/19/2022    BUN 48 (H) 01/19/2022    CREATININE 2 11 (H) 01/19/2022     No results found for: IGE  Lab Results   Component Value Date    ALT 17 01/19/2022    AST 12 01/19/2022    ALKPHOS 64 01/19/2022    BILITOT 0 69 10/22/2015       Imaging and other studies: New images and reports personally reviewed  CT Chest 5/23/21 - background emphysema, resolved RLL nodule, no new lesions     CT Chest 7/6/2020 - background emphysema, stable subpleural scarring in RML and lingula, RLL subpleural nodule noted, bilateral calcified pleural plaques     CT Chest 7/18/19 - resolved LLL pneumonia with resolving RLL nodule and likely residual scar, background upper lobe emphysematous changes, bilateral pleural plaques     CT/PET - 4/17/19 - 1 3x0 8cm RLL nodule with SUV 1 4, 4mm RLL nodule, slight nodular density LLL, no significant mediastinal adenopathy     CT Chest 4/5/19 - noted upper lobe predominant emphysematous changes with 1 2cm RLL spiculated nodule, trace right effusion within fissure, no significant mediastinal adenopathy     CXR 10/22/18 - hyperinflation, flat HD and enlarged anterior/posterior clear space, blunted right CP angle with obscured right HD and suggestion of basilar scarring, no PTX, mild volume loss on right     Pulmonary function testing:   10/22/18 - Ratio 47%, FVC 2 25L (55%), FEV1 1 06L (35%), FEV1 inc 1% post BD, TLC 88%, %, DLCO 19% - severe obstructive airflow defect with reduced VC, normal TLC with increased RV indicative of air trapping and severely reduced diffusion     01/14/19 - 6MWT on RA - total walk distance 397 meters, initial SpO2 97%, romeo SpO2 94%, at conclusion 95%, initial HR 85bpm, maximal HR 107bpm, Ruben Dyspnea Scale 0/10-->1/10     EKG, Pathology, and Other Studies: I have personally reviewed pertinent reports     TTE 9/2021 - EF 20-25%, mild dilated RV, PASP 37mmHg     TTE 1/2019 - TTE EF 10%, PASP 51mmHg, dilated RV     TTE 2014 - EF 20%, normal RV, PASP in normal range    Ross Banda, DO, FACP  [facility]    Answers for HPI/ROS submitted by the patient on 3/10/2022  Do you have shortness of breath that occurs with effort or exertion?: Yes  Do you have ear congestion?: No  Do you have heartburn?: No  Do you have fatigue?: Yes  Do you have nasal congestion?: No  Do you have shortness of breath when lying flat?: No  Do you have shortness of breath when you wake up?: No  Do you have sweats?: No  Have you experienced weight loss?: No  Which of the following makes your symptoms worse?: change in weather, pollen, strenuous activity  Which of the following makes your symptoms better?: steroid inhaler

## 2022-03-24 ENCOUNTER — OFFICE VISIT (OUTPATIENT)
Dept: CARDIOLOGY CLINIC | Facility: CLINIC | Age: 77
End: 2022-03-24
Payer: MEDICARE

## 2022-03-24 VITALS
HEART RATE: 85 BPM | BODY MASS INDEX: 24.19 KG/M2 | WEIGHT: 159.6 LBS | SYSTOLIC BLOOD PRESSURE: 80 MMHG | OXYGEN SATURATION: 100 % | DIASTOLIC BLOOD PRESSURE: 48 MMHG | HEIGHT: 68 IN

## 2022-03-24 DIAGNOSIS — Z86.73 HISTORY OF STROKE: ICD-10-CM

## 2022-03-24 DIAGNOSIS — I42.8 NICM (NONISCHEMIC CARDIOMYOPATHY) (HCC): Primary | ICD-10-CM

## 2022-03-24 DIAGNOSIS — J44.1 CHRONIC OBSTRUCTIVE PULMONARY DISEASE WITH ACUTE EXACERBATION (HCC): ICD-10-CM

## 2022-03-24 DIAGNOSIS — R06.00 DYSPNEA ON EXERTION: ICD-10-CM

## 2022-03-24 DIAGNOSIS — I10 ESSENTIAL HYPERTENSION: ICD-10-CM

## 2022-03-24 PROCEDURE — 99214 OFFICE O/P EST MOD 30 MIN: CPT | Performed by: INTERNAL MEDICINE

## 2022-03-24 RX ORDER — METOPROLOL SUCCINATE 25 MG/1
25 TABLET, EXTENDED RELEASE ORAL DAILY
Qty: 90 TABLET | Refills: 3 | Status: SHIPPED | OUTPATIENT
Start: 2022-03-24

## 2022-03-24 NOTE — PATIENT INSTRUCTIONS
Recommendations:  1  Discontinue bisoprolol  2  Start Metoprolol Succinate 25mg daily  3  Discontinue furosemide  4  Continue remainder of medications  5  Check basic metabolic profile  6  Follow up in 4 months

## 2022-03-24 NOTE — PROGRESS NOTES
Cardiology   Moody Putnam 78 y o  male MRN: [id number]        Impression:  1  Non-ischemic cardiomyopathy - patient with NYHA class III systolic heart failure  EF 10% - improved to 25% with recent echo 9/21  Not interested in ICD/BiV - even if would improve symptoms   Does not want to have any major intervention   Appears more compensated at this time    He may even be dehydrated, so will hold diuretics  2  Hypertension - controlled  3  Tobacco usage - quit  4  COPD - started on Ellipita     5  Word finding difficulties - Likely from CVA         Recommendations:  1  Discontinue bisoprolol  2  Start Metoprolol Succinate 25mg daily  3  Discontinue furosemide  4  Continue remainder of medications  5  Check basic metabolic profile  6  Follow up in 4 months             HPI: Moody Putnam is a 78y o  year old male with non-ischemic cardiomyopathy returns for follow up after a year   He saw Dr Haydee Coombs for advanced heart failure - not interested in palliative care at this point    Currently having aphasia  Has dyspnea with significant exertion  No chest pain or palpitations  Doesn't drink much fluid at this time  No dizziness  Review of Systems   Constitutional: Negative  HENT: Negative  Eyes: Negative  Respiratory: Negative for chest tightness and shortness of breath  Cardiovascular: Negative for chest pain, palpitations and leg swelling  Gastrointestinal: Negative  Endocrine: Negative  Genitourinary: Negative  Musculoskeletal: Negative  Skin: Negative  Allergic/Immunologic: Negative  Neurological: Positive for speech difficulty  Hematological: Negative  Psychiatric/Behavioral: Negative  All other systems reviewed and are negative          Past Medical History:   Diagnosis Date    Cardiomyopathy [facility])     [facility]est[facility])     COPD [facility]chronic obstructive pulmonary disease) ([facility])     Dyspnea on exertion     Essential hypertension     Non-ischemic cardiomyopathy ([facility] [address] )      Past Surgical History:   Procedure Laterality Date    IR BIOPSY BONE MARROW  2021    PILONIDAL CYST EXCISION       Social History     Substance and Sexual Activity   Alcohol Use Not Currently    Alcohol/week: 2 0 standard drinks    Types: 2 Cans of beer per week    Comment: occasion     Social History     Substance and Sexual Activity   Drug Use No     Social History     Tobacco Use   Smoking Status Former Smoker    Packs/day: 1 00    Years: 63 00    Pack years: 63 00    Types: Cigarettes    Start date:     Quit date:     Years since quittin 2   Smokeless Tobacco Never Used   Tobacco Comment    Two cigarettes a day with lunch and dinner      Family History   Problem Relation Age of Onset    Breast cancer Mother 55    Hypertension Father     Cancer Father     Cancer Family     Heart disease Maternal Uncle     Coronary artery disease Maternal Uncle     Heart failure Maternal Uncle     Breast cancer Sister 48    Heart attack Neg Hx     Stroke Neg Hx     Anuerysm Neg Hx     Arrhythmia Neg Hx     Clotting disorder Neg Hx     Hyperlipidemia Neg Hx        Allergies:  No Known Allergies    Medications:     Current Outpatient Medications:     aspirin (ECOTRIN LOW STRENGTH) 81 mg EC tablet, Take 81 mg by mouth daily, Disp: , Rfl:     atorvastatin (LIPITOR) 20 mg tablet, TAKE 1 TABLET BY MOUTH IN  THE EVENING, Disp: 90 tablet, Rfl: 3    bisoprolol (ZEBETA) 5 mg tablet, Take 0 5 tablets (2 5 mg total) by mouth 2 (two) times a day, Disp: 90 tablet, Rfl: 3    Entresto  MG TABS, TAKE 1 TABLET BY MOUTH  TWICE DAILY, Disp: 180 tablet, Rfl: 3    finasteride (PROSCAR) 5 mg tablet, Take 1 tablet (5 mg total) by mouth daily, Disp: 90 tablet, Rfl: 2    fluticasone-umeclidinium-vilanterol (Trelegy Ellipta) 100-62 5-25 MCG/INH inhaler, Inhale 1 puff daily for 14 days Rinse mouth after use , Disp: 28 blister, Rfl: 0    furosemide (LASIX) 20 mg tablet, TAKE 2 TABLETS BY MOUTH  EVERY MORNING AND 1 TABLET  BY MOUTH IN THE AFTERNOON  FOR TOTAL OF 60MG PER DAY, Disp: 270 tablet, Rfl: 3    Incruse Ellipta 62 5 MCG/INH AEPB inhaler, USE 1 INHALATION BY MOUTH  DAILY, Disp: 30 blister, Rfl: 3    Ventolin  (90 Base) MCG/ACT inhaler, USE 2 INHALATIONS BY MOUTH  EVERY 6 HOURS AS NEEDED FOR WHEEZING, Disp: 72 g, Rfl: 3      Wt Readings from Last 3 Encounters:   03/24/22 72 4 kg (159 lb 9 6 oz)   03/16/22 68 5 kg (151 lb)   11/30/21 71 7 kg (158 lb)     Temp Readings from Last 3 Encounters:   03/16/22 (!) 95 5 °F (35 3 °C) (Tympanic)   09/15/21 (!) 97 1 °F (36 2 °C) (Temporal)   09/14/21 98 4 °F (36 9 °C)     BP Readings from Last 3 Encounters:   03/24/22 (!) 80/48   03/16/22 116/66   11/30/21 98/62     Pulse Readings from Last 3 Encounters:   03/24/22 85   03/16/22 88   09/15/21 62         Physical Exam  HENT:      Head: Atraumatic  Mouth/Throat:      Mouth: Mucous membranes are moist    Eyes:      Extraocular Movements: Extraocular movements intact  Cardiovascular:      Rate and Rhythm: Normal rate and regular rhythm  Heart sounds: Normal heart sounds  Pulmonary:      Effort: Pulmonary effort is normal       Breath sounds: Normal breath sounds  Abdominal:      General: Abdomen is flat  Musculoskeletal:         General: Normal range of motion  Cervical back: Normal range of motion  Skin:     General: Skin is warm  Neurological:      Mental Status: He is alert and oriented to person, place, and time  Mental status is at baseline     Psychiatric:         Mood and Affect: Mood normal          Behavior: Behavior normal            Laboratory Studies:  CMP:  Lab Results   Component Value Date     10/22/2015    K 4 5 01/19/2022     01/19/2022    CO2 24 01/19/2022    ANIONGAP 3 (L) 10/22/2015    BUN 48 (H) 01/19/2022    CREATININE 2 11 (H) 01/19/2022    GLUCOSE 100 10/22/2015    AST 12 01/19/2022    ALT 17 01/19/2022    BILITOT 0 69 10/22/2015    EGFR 29 2022       Lipid Profile:   Lab Results   Component Value Date    CHOL 190 10/22/2015     Lab Results   Component Value Date    HDL 98 2021     Lab Results   Component Value Date    LDLCALC 71 2021     Lab Results   Component Value Date    TRIG 50 2021       Cardiac testing:   EKG reviewed personally:   Results for orders placed during the hospital encounter of 21    Echo complete with contrast if indicated    Narrative  [facility] [address] [facility]  [phone]    Transthoracic Echocardiogram  2D, M-mode, Doppler, and Color Doppler    Study date:  03-Sep-2021    Patient: Arjun Sims  MR number: [id number]  Account number: [de-identified]  : 1945  Age: 78 years  Gender: Male  Status: Outpatient  [facility]  Height: 68 in  Weight: 160 6 lb  BP: 110/ 58 mmHg    Indications: NICM  Diagnoses: I42 8 - Other cardiomyopathies    Sonographer:  LENIN Benavides  Primary Physician:  Fior Villaseñor DO  Referring Physician:  Juni Lopez MD  Group:  [facility]  Interpreting Physician:  Lianna Velasco MD    SUMMARY    PROCEDURE INFORMATION:  This was a technically difficult study  LEFT VENTRICLE:  The ventricle was moderately dilated  Systolic function was severely reduced  Ejection fraction was estimated in the range of 20 % to 25 %  There was severe diffuse hypokinesis with distinct regional wall motion abnormalities  Doppler parameters were consistent with abnormal left ventricular relaxation (grade 1 diastolic dysfunction)  Doppler parameters were consistent with elevated mean left atrial filling pressure  Although there was no diagnostic evidence for apical thrombus, this study is not adequate to completely exclude the possibility  Endocardium poorly visualised  Consider constast ECHO  RIGHT VENTRICLE:  The ventricle was mildly dilated      LEFT ATRIUM:  The atrium was markedly dilated  RIGHT ATRIUM:  The atrium was mildly dilated  MITRAL VALVE:  There was mild regurgitation  AORTIC VALVE:  There was no evidence for stenosis  TRICUSPID VALVE:  There was mild regurgitation  Estimated peak PA pressure was 37 mmHg  PULMONIC VALVE:  There was mild regurgitation  IVC, HEPATIC VEINS:  The inferior vena cava was mildly dilated  Respirophasic changes were normal     HISTORY: PRIOR HISTORY: NICM, COPD, HTN, PATTEN, former smoker, CVA  PROCEDURE: The study was performed in the [facility]  This was a routine study  The transthoracic approach was used  The study included complete 2D imaging, M-mode, complete spectral Doppler, and color Doppler  The  heart rate was 54 bpm, at the start of the study  Images were obtained from the parasternal, apical, subcostal, and suprasternal notch acoustic windows  This was a technically difficult study  LEFT VENTRICLE: The ventricle was moderately dilated  Systolic function was severely reduced  Ejection fraction was estimated in the range of 20 % to 25 %  There was severe diffuse hypokinesis with distinct regional wall motion  abnormalities  Wall thickness was normal  Although there was no diagnostic evidence for apical thrombus, this study is not adequate to completely exclude the possibility  Endocardium poorly visualised  Consider constast ECHO  DOPPLER:  Doppler parameters were consistent with abnormal left ventricular relaxation (grade 1 diastolic dysfunction)  Doppler parameters were consistent with elevated mean left atrial filling pressure  RIGHT VENTRICLE: The ventricle was mildly dilated  Systolic function was normal  Wall thickness was normal     LEFT ATRIUM: The atrium was markedly dilated  RIGHT ATRIUM: The atrium was mildly dilated  MITRAL VALVE: Valve structure was normal  There was normal leaflet separation   DOPPLER: The transmitral velocity was within the normal range  There was no evidence for stenosis  There was mild regurgitation  AORTIC VALVE: The valve was trileaflet  Leaflets exhibited normal thickness and normal cuspal separation  DOPPLER: Transaortic velocity was within the normal range  There was no evidence for stenosis  There was no significant  regurgitation  TRICUSPID VALVE: The valve structure was normal  There was normal leaflet separation  DOPPLER: The transtricuspid velocity was within the normal range  There was no evidence for stenosis  There was mild regurgitation  Pulmonary artery  systolic pressure was mildly increased  Estimated peak PA pressure was 37 mmHg  PULMONIC VALVE: Leaflets exhibited normal thickness, no calcification, and normal cuspal separation  DOPPLER: The transpulmonic velocity was within the normal range  There was mild regurgitation  PERICARDIUM: There was no pericardial effusion  The pericardium was normal in appearance  AORTA: The root exhibited normal size  SYSTEMIC VEINS: IVC: The inferior vena cava was mildly dilated   Respirophasic changes were normal     SYSTEM MEASUREMENT TABLES    2D  %FS: 4 88 %  Ao Diam: 3 76 cm  EDV(Teich): 240 43 ml  EF Biplane: 31 46 %  EF(Teich): 10 72 %  ESV(Teich): 214 64 ml  IVSd: 0 87 cm  LA Diam: 4 22 cm  LAAs A2C: 29 06 cm2  LAAs A4C: 36 57 cm2  LAESV A-L A2C: 103 95 ml  LAESV A-L A4C: 177 89 ml  LAESV Index (A-L): 76 ml/m2  LAESV MOD A2C: 99 41 ml  LAESV MOD A4C: 169 92 ml  LAESV(A-L): 141 36 ml  LAESV(MOD BP): 135 01 ml  LAESVInd MOD BP: 72 58 ml/m2  LALs A2C: 6 9 cm  LALs A4C: 6 38 cm  LVEDV MOD A2C: 305 74 ml  LVEDV MOD A4C: 290 04 ml  LVEDV MOD BP: 304 7 ml  LVEDVInd MOD BP: 163 82 ml/m2  LVEF MOD A2C: 31 12 %  LVEF MOD A4C: 29 79 %  LVESV MOD A2C: 210 59 ml  LVESV MOD A4C: 203 63 ml  LVESV MOD BP: 208 84 ml  LVESVInd MOD BP: 112 28 ml/m2  LVIDd: 6 81 cm  LVIDs: 6 48 cm  LVLd A2C: 10 24 cm  LVLd A4C: 9 75 cm  LVLs A2C: 9 15 cm  LVLs A4C: 8 96 cm  LVPWd: 0 63 cm  Leda A4C: 18 01 cm2  RAAs A4C: 22 48 cm2  RAEDV A-L: 58 65 ml  RAEDV MOD: 58 98 ml  RAESV A-L: 76 47 ml  RAESV MOD: 74 55 ml  RALd: 4 7 cm  RALs: 5 61 cm  RVIDd: 4 47 cm  SV MOD A2C: 95 15 ml  SV MOD A4C: 86 41 ml  SV(Teich): 25 79 ml    CW  AV Env  Ti: 327 31 ms  AV MaxP 2 mmHg  AV VTI: 29 72 cm  AV Vmax: 1 34 m/s  AV Vmean: 0 91 m/s  AV meanPG: 3 74 mmHg  PRend P 6 mmHg  PRend Vmax: 1 26 m/s  TR MaxP 89 mmHg  TR Vmax: 2 64 m/s    PW  E' Sept: 0 05 m/s  E/E' Sept: 16 63  LVOT Env  Ti: 338 73 ms  LVOT VTI: 14 7 cm  LVOT Vmax: 0 64 m/s  LVOT Vmean: 0 43 m/s  LVOT maxP 63 mmHg  LVOT meanP 87 mmHg  MV A Anupam: 0 99 m/s  MV Dec Frontier: 3 58 m/s2  MV DecT: 232 93 ms  MV E Anupam: 0 83 m/s  MV E/A Ratio: 0 84  MV PHT: 67 55 ms  MVA By PHT: 3 26 cm2  PV Acc Frontier: 7 62 m/s2  PV AccT: 87 54 ms    [employer] Accredited Echocardiography [facility]    Prepared and electronically signed by    Sepideh Serrano MD  Signed 03-Sep-2021 14:37:46    No results found for this or any previous visit  No results found for this or any previous visit  No results found for this or any previous visit

## 2022-04-01 ENCOUNTER — OFFICE VISIT (OUTPATIENT)
Dept: HEMATOLOGY ONCOLOGY | Facility: CLINIC | Age: 77
End: 2022-04-01
Payer: MEDICARE

## 2022-04-01 VITALS
DIASTOLIC BLOOD PRESSURE: 60 MMHG | HEART RATE: 75 BPM | RESPIRATION RATE: 16 BRPM | OXYGEN SATURATION: 96 % | WEIGHT: 160 LBS | BODY MASS INDEX: 24.25 KG/M2 | SYSTOLIC BLOOD PRESSURE: 110 MMHG | HEIGHT: 68 IN | TEMPERATURE: 97.1 F

## 2022-04-01 DIAGNOSIS — N18.4 CHRONIC KIDNEY DISEASE, STAGE 4 (SEVERE) (HCC): ICD-10-CM

## 2022-04-01 DIAGNOSIS — E44.1 MILD PROTEIN-CALORIE MALNUTRITION (HCC): ICD-10-CM

## 2022-04-01 DIAGNOSIS — E46 PROTEIN-CALORIE MALNUTRITION, UNSPECIFIED SEVERITY (HCC): ICD-10-CM

## 2022-04-01 DIAGNOSIS — D61.9 APLASTIC ANEMIA (HCC): ICD-10-CM

## 2022-04-01 DIAGNOSIS — E53.8 B12 DEFICIENCY: Primary | ICD-10-CM

## 2022-04-01 DIAGNOSIS — D61.818 PANCYTOPENIA (HCC): ICD-10-CM

## 2022-04-01 PROBLEM — G89.29 CHRONIC LEFT SHOULDER PAIN: Status: ACTIVE | Noted: 2022-04-01

## 2022-04-01 PROBLEM — M25.512 CHRONIC LEFT SHOULDER PAIN: Status: ACTIVE | Noted: 2022-04-01

## 2022-04-01 PROCEDURE — 99215 OFFICE O/P EST HI 40 MIN: CPT | Performed by: PHYSICIAN ASSISTANT

## 2022-04-01 NOTE — PROGRESS NOTES
Hematology/Oncology Outpatient Follow- up Note  Priyanka Senior 78 y o  male MRN: @ Encounter: [id number]        Date:  4/1/2022      Assessment / Plan:    1  Nonsevere Aplastic anemia (ANC>500; normal retic count; platelet EGDVD>99,966) diagnosed 5/2021  He presented with progressive pancytopenia  Not prone to frequent infections  No easy bruising or bleeding  Initial workup 5/2021 revealed nonreactive HIV, normal haptoglobin, normal reticulocyte count 1 09, no monoclonal band on SPEP, TSH, iron panel    BMBX 5/26/21 -Hypocellular marrow (5% cellularity) with trilineage hematopoiesis  - 55 XY, 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 (p [id number]) was detected in this patient's sample  No evidence for leukemia or lymphoma  No HSM identified on 5/21 abd u/s  He has been observed  Comorbid health conditions of Non-ischemic Cardiomyopathy and advanced heart failure  EF was 10%; improved to 20-25% 9/2021, advanced COPD with PATTEN for many years  S/P CVA with expressive aphasia  2   Hemoglobin has been decreasing slowly  13 g/dL 10/2017   11 grams/deciliter range 2018 to 2019   9 gram/deciliter range early half of 2021  Since September 2021, hemoglobin has been 8 5-9 1  ANC remains > 1 3 since April 2018  Platelet count decreased from [phone] to 2019   90 range 2021  65-78 November 2021 through 1/19/2022    3  Worsening renal function  Creatinine less than 1 2 May 2019 and priors  Baseline then increase to 1 5-1 6  BUN 48 with creatinine 2 1 1/19/2022  He was recently taken off lasix 3/22 per cardiology  4   History of alcohol use and low B12  He quit drinking 6/2021  B12 improved from 260 5/21 - 736 11/2021  No evidence of hemolysis on 1/22 CMP or other prior labs  Will recheck and also check iron panel and investigate for other causes of anemia  It is possible that progressive anemia may be 2nd to progressive kidney disease and he might benefit from epogen replacement    He remains symptomatic  Reports that his energy and breathing are at baseline  Left shoulder pain x1 year  We discussed x-ray  We discussed orthopedic evaluation  He declines further evaluation          HPI:  Carol Henson is a 79 y/o man seen initially by hematology 5/2021 regarding pancytopenia  04/20/2021: WBC 4 07, Hgb 9 4g/dL, , platelet 94, ANC 3 09, normal differential  Patient has had these blood work findings since approximately 4/2018  Significant cardiopulmonary history  He has severe COPD with acute exacerbation  Follows with Dr Tank Quezada  He has history of Pulmonary Nodule also followed by Dr Tank Quezada  1 3 cm with SUV 1 4 on 7/2019 CT Decreased size on repeat CT Chest July 2019 and resolved on May 2021  Annual LDCT chest annually due May 2022    History of Non-ischemic Cardiomyopathy and advanced heart failure  EF was 10%; improved to 20-25% 9/2021  Follows with Dr Maryjane Pablo  Not interested in ICD/BiV - even if would improve symptoms  Does not want to have any major intervention    Chronic PATTEN > 20   Smoked 1 -1  5PPD x 50 years quit 2018  History of expressive aphasia since 2018  MRI from April of 2019 there was mention made of an area of scarring/encephalomalacia in the left frontoparietal junction which is consistent with an old/chronic stroke  Since 2019, creatinine and BUN elevated  He drank 2 cans of beer a day  Retired, worked at [employer], also worked at [employer]  He does have a family history of breast cancer in mother and sister  Lung cancer history in father and brother  He has never had a colonoscopy before  Not prone to frequent infections  No easy bruising or bleeding  Initial workup 5/2021 revealed nonreactive HIV, normal haptoglobin, normal reticulocyte count 1 09, no monoclonal band on SPEP, TSH, iron panel    BMBX 5/26/21: 55 XY, 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 (p [id number]) was detected in this patient's sample    Hypocellular marrow (5% cellularity) with trilineage hematopoiesis  No evidence for leukemia or lymphoma  Increased iron stores  Patient was observed due to stability of counts, ANC> 500 and no frequent infection or bleeding  Patient did discontinue alcohol use 2021  B12 level did improve  Denies any easy bruising or bleeding  No history of clot  Interval History:  Worsening cr  Cardiology discontinued Lasix  Reports normal urine output  Urine is clear  Reports good appetite  Denies any headaches, dizziness, chest pain  Dyspnea on exertion is at baseline  He is not experiencing any orthostatic hypotension symptoms  No infections  No bleeding or bruising  Left shoulder pain for approximately 1 year  Patient states his mobility is slightly compromised  He notices this most when he goes to put on his jacket  No weakness in his arm  Denies any trauma         Test Results:        Labs:   Lab Results   Component Value Date    HGB 8 8 (L) 01/19/2022    HCT 27 7 (L) 01/19/2022     (H) 01/19/2022    PLT 72 (L) 01/19/2022    WBC 3 45 (L) 01/19/2022    NRBC 0 01/19/2022     Lab Results   Component Value Date     10/22/2015    K 4 5 01/19/2022     01/19/2022    CO2 24 01/19/2022    ANIONGAP 3 (L) 10/22/2015    BUN 48 (H) 01/19/2022    CREATININE 2 11 (H) 01/19/2022    GLUCOSE 100 10/22/2015    GLUF 117 (H) 01/19/2022    CALCIUM 9 3 01/19/2022    CORRECTEDCA 9 6 04/20/2021    AST 12 01/19/2022    ALT 17 01/19/2022    ALKPHOS 64 01/19/2022    PROT 7 7 10/22/2015    BILITOT 0 69 10/22/2015    EGFR 29 01/19/2022       Imaging: No results found  ROS:  As mentioned in HPI & Interval History otherwise 14 point ROS negative  Allergies: No Known Allergies  Current Medications: Reviewed  PMH/FH/SH:  Reviewed      Physical Exam:    There is no height or weight on file to calculate BSA      Ht Readings from Last 3 Encounters:   03/24/22 5' 8" (1 727 m)   03/16/22 5' 8" (1 727 m)   11/30/21 5' 9" (1 753 m)        Wt Readings from Last 3 Encounters:   22 72 4 kg (159 lb 9 6 oz)   22 68 5 kg (151 lb)   21 71 7 kg (158 lb)        Temp Readings from Last 3 Encounters:   22 (!) 95 5 °F (35 3 °C) (Tympanic)   09/15/21 (!) 97 1 °F (36 2 °C) (Temporal)   21 98 4 °F (36 9 °C)        BP Readings from Last 3 Encounters:   22 (!) 80/48   22 116/66   21 98/62           Physical Exam  Constitutional:       Appearance: He is well-developed  Comments: Thin   HENT:      Head: Normocephalic and atraumatic  Cardiovascular:      Rate and Rhythm: Normal rate  Comments: 1+ symmetric LE edema  Pulmonary:      Effort: Pulmonary effort is normal  No respiratory distress  Breath sounds: Normal breath sounds  Abdominal:      Palpations: Abdomen is soft  Musculoskeletal:      Comments: No limitation with internal rotation of his left arm  No tenderness to palpation  Lifting up his left arm overhead is not entirely fluid   Skin:     General: Skin is warm and dry  Comments: No petechiae   Neurological:      Mental Status: He is alert and oriented to person, place, and time        Comments: Expressive aphasia   Psychiatric:         Behavior: Behavior normal          ECO      Emergency Contacts:    Extended Emergency Contact Information  Primary Emergency Contact: Fanny Chand  Address: [address] [place] [address] Phone: [phone]  Mobile Phone: [phone]  Relation: Spouse

## 2022-04-25 DIAGNOSIS — I50.22 CHRONIC SYSTOLIC CONGESTIVE HEART FAILURE (HCC): Primary | ICD-10-CM

## 2022-04-25 RX ORDER — FUROSEMIDE 20 MG/1
20 TABLET ORAL 2 TIMES DAILY
COMMUNITY
End: 2022-04-25 | Stop reason: SDUPTHER

## 2022-04-25 RX ORDER — FUROSEMIDE 20 MG/1
20 TABLET ORAL 2 TIMES DAILY
Qty: 60 TABLET | Refills: 1 | Status: SHIPPED | OUTPATIENT
Start: 2022-04-25 | End: 2022-07-14 | Stop reason: SDUPTHER

## 2022-04-25 NOTE — TELEPHONE ENCOUNTER
Pt called today stating he was told to stop the furosemide 20mg  Pt recently been having ankle edema and he restarted the medication  Pt is requesting a renewal of the furosemide 20mg

## 2022-05-19 ENCOUNTER — TELEPHONE (OUTPATIENT)
Dept: PULMONOLOGY | Facility: CLINIC | Age: 77
End: 2022-05-19

## 2022-05-19 NOTE — TELEPHONE ENCOUNTER
Called pt and she said that he did not like the Trelegy  I explained to her that he would need to come in for a sick visit  As per his wife he can not do a virtual  I do not see any openings in [facility] and she said he can only come to [facility]   Please advise

## 2022-05-19 NOTE — TELEPHONE ENCOUNTER
Patient's wife calling stating Edith Patrick is not due for a refill of his ventolin until 6/9 because he is using it so much, she wants to know if there is something over the counter he can use until his ventolin gets filled   Please advise [phone]

## 2022-05-23 ENCOUNTER — HOSPITAL ENCOUNTER (OUTPATIENT)
Dept: CT IMAGING | Facility: HOSPITAL | Age: 77
Discharge: HOME | End: 2022-05-23
Attending: INTERNAL MEDICINE
Payer: MEDICARE

## 2022-05-23 DIAGNOSIS — F17.211 CIGARETTE NICOTINE DEPENDENCE IN REMISSION: ICD-10-CM

## 2022-05-23 PROCEDURE — 71271 CT THORAX LUNG CANCER SCR C-: CPT

## 2022-05-24 ENCOUNTER — OFFICE VISIT (OUTPATIENT)
Dept: PULMONOLOGY | Facility: CLINIC | Age: 77
End: 2022-05-24
Payer: MEDICARE

## 2022-05-24 VITALS
TEMPERATURE: 96.3 F | BODY MASS INDEX: 22.37 KG/M2 | OXYGEN SATURATION: 99 % | WEIGHT: 147.6 LBS | HEIGHT: 68 IN | HEART RATE: 85 BPM | RESPIRATION RATE: 18 BRPM | DIASTOLIC BLOOD PRESSURE: 68 MMHG | SYSTOLIC BLOOD PRESSURE: 114 MMHG

## 2022-05-24 DIAGNOSIS — I42.8 NICM (NONISCHEMIC CARDIOMYOPATHY) (HCC): ICD-10-CM

## 2022-05-24 DIAGNOSIS — R93.89 ABNORMAL CT OF THE CHEST: Primary | ICD-10-CM

## 2022-05-24 DIAGNOSIS — Z72.0 TOBACCO ABUSE: ICD-10-CM

## 2022-05-24 DIAGNOSIS — J43.2 CENTRILOBULAR EMPHYSEMA (HCC): ICD-10-CM

## 2022-05-24 DIAGNOSIS — R63.4 WEIGHT LOSS: ICD-10-CM

## 2022-05-24 PROBLEM — J43.9 PULMONARY EMPHYSEMA (HCC): Status: ACTIVE | Noted: 2019-01-14

## 2022-05-24 PROCEDURE — 99215 OFFICE O/P EST HI 40 MIN: CPT | Performed by: INTERNAL MEDICINE

## 2022-05-24 RX ORDER — TIOTROPIUM BROMIDE AND OLODATEROL 3.124; 2.736 UG/1; UG/1
2 SPRAY, METERED RESPIRATORY (INHALATION) DAILY
Qty: 4 G | Refills: 0 | Status: SHIPPED | COMMUNITY
Start: 2022-05-24 | End: 2022-07-11

## 2022-05-24 RX ORDER — CEFDINIR 300 MG/1
300 CAPSULE ORAL EVERY 12 HOURS SCHEDULED
Qty: 14 CAPSULE | Refills: 0 | Status: SHIPPED | OUTPATIENT
Start: 2022-05-24 | End: 2022-06-01

## 2022-05-24 NOTE — PATIENT INSTRUCTIONS
Stop Incruse, start Stiolto 2puffs once a day  Resume lasix  Start Cefdinir antibiotic x 7 days  Give sputum culture  Get labs drawn this week  Repeat CT chest in 6 weeks

## 2022-05-24 NOTE — PROGRESS NOTES
Pulmonary Follow Up Note   Valerie Samuel 78 y o  male MRN: [id number]  5/24/2022      Assessment:  1  Abnormal CT Chest  · I personally reviewed images with him and his wife  · Given nodular infiltrate suspect smoldering infection however malignancy is not excluded  · Will give trial of cefdinir 300mg BID x 7 days  · Send sputum sample  · To obtain labs later this week  · Will repeat CT at 6 week interval  · If not improved, will need to consider bronchoscopy/tissue sampling and CT/PET scan  · Continue to monitor weight, increase PO intake, Ensure/boost     2  Severe COPD with acute exacerbation  · GOLD Stage III, Class C - FEV1 1 06L 35% 10/2018  · Evidence of air trapping on PFTs  · Hold incruse, give trial of Stiolto 2puffs daily, samples given and logged into registry, proper administration reviewed  · Repeat spirometry with next visit  · Flu Vaccine 2021 pneumovax 2017, prevnar 2015, COVID-19 x 3  · Follow up in 3 months or sooner as needed     3  Pulmonary Nodule  · As above, await formal radiology read on CT     4  Nicotine dependence in sustained remission  · Remain tobacco free, LDCT annually     5  Non-ischemic Cardiomyopathy EF 20-25% - resuming lasix therapy now and repeat BMP this week     6  Expressive Aphasia - further care per neurology, no sig improvement with speech therapy     7  Pancytopenia - now diagnosed with aplastic anemia and follows with hematology, repeat CBC pending    Plan:    Diagnoses and all orders for this visit:    Abnormal CT of the chest  -     cefdinir (OMNICEF) 300 mg capsule; Take 1 capsule (300 mg total) by mouth every 12 (twelve) hours for 7 days  -     CT chest wo contrast; Future  -     Sputum culture and Gram stain; Future    Centrilobular emphysema (HCC)  -     tiotropium-olodaterol (Stiolto Respimat) 2 5-2 5 MCG/ACT inhaler; Inhale 2 puffs  in the morning   -     albuterol (Ventolin HFA) 90 mcg/act inhaler;  Inhale 2 puffs every 4 (four) hours as needed for wheezing or shortness of breath    NICM (nonischemic cardiomyopathy) ([facility] [address] )    Tobacco abuse in remission    Weight loss        Return in about 6 weeks (around 7/5/2022) for Recheck  History of Present Illness   HPI:  Marck Gallardo is a 78 y o  male who presented for dyspnea and COPD evaluation by Dr Christal Mckeon  He has history of NICM EF 10-20% and HTN    He was diagnosed with aplastic anemia in 2021 and on observation status  He was initially seen in Jan 2019 and diagnosed with severe COPD and did not require supplemental oxygen  He was last seen in March 2022 and noted increased dyspnea and rescue inhaler use  Planned for trial of Trelegy and OCS burst   He presents today for sick visit  He reports with his wife who offers history  He has been using CLAYTON multiple times a day for added SOB  He also noted increased LE edema and has resumed lasix yesterday  He has cough but intermittent sputum production  He denied fevers or chills, no night sweats  He has fatigue and poor appetite with noted 13lb weight loss in past 6 weeks  He has been using boost/ensure once a day  He denied hemoptysis, no pleurisy  Review of Systems   Constitutional: Positive for activity change and fatigue  Negative for appetite change and fever  HENT: Positive for rhinorrhea  Negative for ear pain, postnasal drip, sneezing, sore throat and trouble swallowing  Respiratory: Positive for cough, shortness of breath and wheezing  Cardiovascular: Positive for leg swelling  Negative for chest pain and palpitations  Gastrointestinal: Negative for abdominal pain, nausea and vomiting  Musculoskeletal: Negative for myalgias  Neurological: Negative for headaches  Hematological: Negative for adenopathy  Psychiatric/Behavioral: Negative for sleep disturbance         Historical Information   Past Medical History:   Diagnosis Date    Cardiomyopathy [facility])     CHF (congestive heart failure) (HCC)     COPD [facility]chronic obstructive pulmonary disease) ([facility] [address] )     Dyspnea on exertion     Essential hypertension     Non-ischemic cardiomyopathy (HCC)      Past Surgical History:   Procedure Laterality Date    IR BIOPSY BONE MARROW  5/26/2021    PILONIDAL CYST EXCISION       Family History   Problem Relation Age of Onset    Breast cancer Mother 55    Hypertension Father     Cancer Father     Cancer Family     Heart disease Maternal Uncle     Coronary artery disease Maternal Uncle     Heart failure Maternal Uncle     Breast cancer Sister 48    Heart attack Neg Hx     Stroke Neg Hx     Anuerysm Neg Hx     Arrhythmia Neg Hx     Clotting disorder Neg Hx     Hyperlipidemia Neg Hx          Meds/Allergies     Current Outpatient Medications:     albuterol (Ventolin HFA) 90 mcg/act inhaler, Inhale 2 puffs every 4 (four) hours as needed for wheezing or shortness of breath, Disp: 18 g, Rfl: 0    aspirin (ECOTRIN LOW STRENGTH) 81 mg EC tablet, Take 81 mg by mouth daily, Disp: , Rfl:     atorvastatin (LIPITOR) 20 mg tablet, TAKE 1 TABLET BY MOUTH IN  THE EVENING, Disp: 90 tablet, Rfl: 3    cefdinir (OMNICEF) 300 mg capsule, Take 1 capsule (300 mg total) by mouth every 12 (twelve) hours for 7 days, Disp: 14 capsule, Rfl: 0    Entresto  MG TABS, TAKE 1 TABLET BY MOUTH  TWICE DAILY, Disp: 180 tablet, Rfl: 3    finasteride (PROSCAR) 5 mg tablet, Take 1 tablet (5 mg total) by mouth daily, Disp: 90 tablet, Rfl: 2    furosemide (LASIX) 20 mg tablet, Take 1 tablet (20 mg total) by mouth 2 (two) times a day, Disp: 60 tablet, Rfl: 1    Incruse Ellipta 62 5 MCG/INH AEPB inhaler, USE 1 INHALATION BY MOUTH  DAILY, Disp: 30 blister, Rfl: 3    metoprolol succinate (TOPROL-XL) 25 mg 24 hr tablet, Take 1 tablet (25 mg total) by mouth daily, Disp: 90 tablet, Rfl: 3    tiotropium-olodaterol (Stiolto Respimat) 2 5-2 5 MCG/ACT inhaler, Inhale 2 puffs  in the morning , Disp: 4 g, Rfl: 0    Ventolin  (90 Base) MCG/ACT inhaler, USE 2 INHALATIONS BY MOUTH EVERY 6 HOURS AS NEEDED FOR WHEEZING, Disp: 72 g, Rfl: 3    fluticasone-umeclidinium-vilanterol (Trelegy Ellipta) 100-62 5-25 MCG/INH inhaler, Inhale 1 puff daily for 14 days Rinse mouth after use , Disp: 28 blister, Rfl: 0  No Known Allergies    Vitals: Blood pressure 114/68, pulse 85, temperature (!) 96 3 °F (35 7 °C), temperature source Tympanic, resp  rate 18, height 5' 8" (1 727 m), weight 67 kg (147 lb 9 6 oz), SpO2 99 %  Body mass index is 22 44 kg/m²  Oxygen Therapy  SpO2: 99 %      Physical Exam  Physical Exam  Vitals reviewed  Constitutional:       General: He is not in acute distress  Appearance: He is well-developed and normal weight  He is not ill-appearing, toxic-appearing or diaphoretic  Comments: Thin man noted weight loss   HENT:      Head: Normocephalic and atraumatic  Right Ear: External ear normal       Left Ear: External ear normal       Nose: Nose normal       Mouth/Throat:      Mouth: Mucous membranes are moist       Pharynx: Oropharynx is clear  No oropharyngeal exudate  Comments: No thrush  Eyes:      General: No scleral icterus  Right eye: No discharge  Left eye: No discharge  Conjunctiva/sclera: Conjunctivae normal       Pupils: Pupils are equal, round, and reactive to light  Neck:      Vascular: No JVD  Trachea: No tracheal deviation  Cardiovascular:      Rate and Rhythm: Normal rate and regular rhythm  Heart sounds: Normal heart sounds  No murmur heard  Pulmonary:      Effort: Pulmonary effort is normal  No respiratory distress  Breath sounds: No stridor  Rhonchi and rales present  No wheezing  Comments: Right sided mixed rales and slight rhonchi, no wheeze  Abdominal:      General: Bowel sounds are normal  There is no distension  Palpations: Abdomen is soft  Tenderness: There is no abdominal tenderness  There is no guarding  Musculoskeletal:         General: No swelling or deformity        Cervical back: Neck supple  Right lower leg: Edema present  Left lower leg: Edema present  Comments: 2+ pedal and 1+ pretibial edema b/l   Lymphadenopathy:      Cervical: No cervical adenopathy  Skin:     General: Skin is warm and dry  Findings: No rash  Neurological:      Mental Status: He is alert and oriented to person, place, and time  Mental status is at baseline  Comments: Noted expressive aphasia   Psychiatric:         Mood and Affect: Mood normal          Behavior: Behavior normal          Thought Content: Thought content normal          Labs: I have personally reviewed pertinent lab results    Lab Results   Component Value Date    WBC 3 45 (L) 01/19/2022    HGB 8 8 (L) 01/19/2022    HCT 27 7 (L) 01/19/2022     (H) 01/19/2022    PLT 72 (L) 01/19/2022     Lab Results   Component Value Date    GLUCOSE 100 10/22/2015    CALCIUM 9 3 01/19/2022     10/22/2015    K 4 5 01/19/2022    CO2 24 01/19/2022     01/19/2022    BUN 48 (H) 01/19/2022    CREATININE 2 11 (H) 01/19/2022     No results found for: IGE  Lab Results   Component Value Date    ALT 17 01/19/2022    AST 12 01/19/2022    ALKPHOS 64 01/19/2022    BILITOT 0 69 10/22/2015       Imaging and other studies: New images and reports personally reviewed  CT Chest 5/23/2022 - background emphysema, new RUL nodular peripheral infiltrate, fluid right fissure, nodular infiltrate distal lingula, no sig mediastinal adenopathy, no PTX    CT Chest 5/23/21 - background emphysema, resolved RLL nodule, no new lesions     CT Chest 7/6/2020 - background emphysema, stable subpleural scarring in RML and lingula, RLL subpleural nodule noted, bilateral calcified pleural plaques     CT Chest 7/18/19 - resolved LLL pneumonia with resolving RLL nodule and likely residual scar, background upper lobe emphysematous changes, bilateral pleural plaques     CT/PET - 4/17/19 - 1 3x0 8cm RLL nodule with SUV 1 4, 4mm RLL nodule, slight nodular density LLL, no significant mediastinal adenopathy     CT Chest 4/5/19 - noted upper lobe predominant emphysematous changes with 1 2cm RLL spiculated nodule, trace right effusion within fissure, no significant mediastinal adenopathy     CXR 10/22/18 - hyperinflation, flat HD and enlarged anterior/posterior clear space, blunted right CP angle with obscured right HD and suggestion of basilar scarring, no PTX, mild volume loss on right     Pulmonary function testing:   10/22/18 - Ratio 47%, FVC 2 25L (55%), FEV1 1 06L (35%), FEV1 inc 1% post BD, TLC 88%, %, DLCO 19% - severe obstructive airflow defect with reduced VC, normal TLC with increased RV indicative of air trapping and severely reduced diffusion     01/14/19 - 6MWT on RA - total walk distance 397 meters, initial SpO2 97%, romeo SpO2 94%, at conclusion 95%, initial HR 85bpm, maximal HR 107bpm, Ruben Dyspnea Scale 0/10-->1/10     EKG, Pathology, and Other Studies: I have personally reviewed pertinent reports     TTE 9/2021 - EF 20-25%, mild dilated RV, PASP 37mmHg     TTE 1/2019 - TTE EF 10%, PASP 51mmHg, dilated RV     TTE 2014 - EF 20%, normal RV, PASP in normal range    Ross Banda, , FACP  [facility]    Answers for HPI/ROS submitted by the patient on 5/24/2022  Do you have difficulty breathing?: Yes  Do you have a hoarse voice?: Yes  Chronicity: recurrent  When did you first notice your symptoms?: more than 1 month ago  How often do your symptoms occur?: daily  Since you first noticed this problem, how has it changed?: gradually worsening  Do you have shortness of breath that occurs with effort or exertion?: Yes  Do you have ear congestion?: No  Do you have heartburn?: No  Do you have fatigue?: Yes  Do you have nasal congestion?: Yes  Do you have shortness of breath when lying flat?: Yes  Do you have shortness of breath when you wake up?: No  Do you have sweats?: No  Have you experienced weight loss?: No  Which of the following makes your symptoms worse?: change in weather, climbing stairs, minimal activity, pollen  Which of the following makes your symptoms better?: steroid inhaler  Risk factors for lung disease: occupational exposure, smoking/tobacco exposure

## 2022-05-25 ENCOUNTER — APPOINTMENT (OUTPATIENT)
Dept: LAB | Facility: CLINIC | Age: 77
End: 2022-05-25
Payer: MEDICARE

## 2022-05-25 DIAGNOSIS — E46 PROTEIN-CALORIE MALNUTRITION, UNSPECIFIED SEVERITY (HCC): ICD-10-CM

## 2022-05-25 DIAGNOSIS — D61.9 APLASTIC ANEMIA (HCC): ICD-10-CM

## 2022-05-25 DIAGNOSIS — R33.9 INCOMPLETE EMPTYING OF BLADDER: ICD-10-CM

## 2022-05-25 DIAGNOSIS — E44.1 MILD PROTEIN-CALORIE MALNUTRITION (HCC): ICD-10-CM

## 2022-05-25 DIAGNOSIS — N18.4 CHRONIC KIDNEY DISEASE, STAGE 4 (SEVERE) (HCC): ICD-10-CM

## 2022-05-25 LAB
ALBUMIN SERPL BCP-MCNC: 3.9 G/DL (ref 3.5–5)
ALP SERPL-CCNC: 64 U/L (ref 34–104)
ALT SERPL W P-5'-P-CCNC: 13 U/L (ref 7–52)
ANION GAP SERPL CALCULATED.3IONS-SCNC: 6 MMOL/L (ref 4–13)
APTT PPP: 35 SECONDS (ref 23–37)
AST SERPL W P-5'-P-CCNC: 14 U/L (ref 13–39)
BASOPHILS # BLD AUTO: 0.02 THOUSANDS/ΜL (ref 0–0.1)
BASOPHILS NFR BLD AUTO: 1 % (ref 0–1)
BILIRUB SERPL-MCNC: 0.74 MG/DL (ref 0.2–1)
BUN SERPL-MCNC: 43 MG/DL (ref 5–25)
CALCIUM SERPL-MCNC: 8.9 MG/DL (ref 8.4–10.2)
CHLORIDE SERPL-SCNC: 105 MMOL/L (ref 96–108)
CO2 SERPL-SCNC: 29 MMOL/L (ref 21–32)
CREAT SERPL-MCNC: 1.61 MG/DL (ref 0.6–1.3)
EOSINOPHIL # BLD AUTO: 0.19 THOUSAND/ΜL (ref 0–0.61)
EOSINOPHIL NFR BLD AUTO: 5 % (ref 0–6)
ERYTHROCYTE [DISTWIDTH] IN BLOOD BY AUTOMATED COUNT: 16.2 % (ref 11.6–15.1)
FERRITIN SERPL-MCNC: 190 NG/ML (ref 8–388)
GFR SERPL CREATININE-BSD FRML MDRD: 41 ML/MIN/1.73SQ M
GLUCOSE P FAST SERPL-MCNC: 104 MG/DL (ref 65–99)
HCT VFR BLD AUTO: 26.3 % (ref 36.5–49.3)
HCYS SERPL-SCNC: 14.4 UMOL/L (ref 5.3–14.2)
HGB BLD-MCNC: 8.3 G/DL (ref 12–17)
IMM GRANULOCYTES # BLD AUTO: 0.02 THOUSAND/UL (ref 0–0.2)
IMM GRANULOCYTES NFR BLD AUTO: 1 % (ref 0–2)
INR PPP: 1.08 (ref 0.84–1.19)
IRON SATN MFR SERPL: 22 % (ref 20–50)
IRON SERPL-MCNC: 63 UG/DL (ref 65–175)
LDH SERPL-CCNC: 147 U/L (ref 140–271)
LYMPHOCYTES # BLD AUTO: 1.33 THOUSANDS/ΜL (ref 0.6–4.47)
LYMPHOCYTES NFR BLD AUTO: 33 % (ref 14–44)
MCH RBC QN AUTO: 35.9 PG (ref 26.8–34.3)
MCHC RBC AUTO-ENTMCNC: 31.6 G/DL (ref 31.4–37.4)
MCV RBC AUTO: 114 FL (ref 82–98)
MONOCYTES # BLD AUTO: 0.55 THOUSAND/ΜL (ref 0.17–1.22)
MONOCYTES NFR BLD AUTO: 14 % (ref 4–12)
NEUTROPHILS # BLD AUTO: 1.94 THOUSANDS/ΜL (ref 1.85–7.62)
NEUTS SEG NFR BLD AUTO: 46 % (ref 43–75)
NRBC BLD AUTO-RTO: 0 /100 WBCS
PLATELET # BLD AUTO: 63 THOUSANDS/UL (ref 149–390)
PMV BLD AUTO: 11.9 FL (ref 8.9–12.7)
POTASSIUM SERPL-SCNC: 4.1 MMOL/L (ref 3.5–5.3)
PROT SERPL-MCNC: 7 G/DL (ref 6.4–8.4)
PROTHROMBIN TIME: 14 SECONDS (ref 11.6–14.5)
PSA SERPL-MCNC: 0.6 NG/ML (ref 0–4)
RBC # BLD AUTO: 2.31 MILLION/UL (ref 3.88–5.62)
RETICS # AUTO: ABNORMAL 10*3/UL (ref 14356–105094)
RETICS # CALC: 2.01 % (ref 0.37–1.87)
SODIUM SERPL-SCNC: 140 MMOL/L (ref 135–147)
TIBC SERPL-MCNC: 288 UG/DL (ref 250–450)
URATE SERPL-MCNC: 8.9 MG/DL (ref 3.5–8.5)
WBC # BLD AUTO: 4.05 THOUSAND/UL (ref 4.31–10.16)

## 2022-05-25 PROCEDURE — 85610 PROTHROMBIN TIME: CPT

## 2022-05-25 PROCEDURE — 82668 ASSAY OF ERYTHROPOIETIN: CPT

## 2022-05-25 PROCEDURE — 83540 ASSAY OF IRON: CPT

## 2022-05-25 PROCEDURE — 36415 COLL VENOUS BLD VENIPUNCTURE: CPT

## 2022-05-25 PROCEDURE — 85025 COMPLETE CBC W/AUTO DIFF WBC: CPT

## 2022-05-25 PROCEDURE — 83550 IRON BINDING TEST: CPT

## 2022-05-25 PROCEDURE — 84550 ASSAY OF BLOOD/URIC ACID: CPT

## 2022-05-25 PROCEDURE — 82728 ASSAY OF FERRITIN: CPT

## 2022-05-25 PROCEDURE — 85730 THROMBOPLASTIN TIME PARTIAL: CPT

## 2022-05-25 PROCEDURE — 83615 LACTATE (LD) (LDH) ENZYME: CPT

## 2022-05-25 PROCEDURE — 85045 AUTOMATED RETICULOCYTE COUNT: CPT

## 2022-05-25 PROCEDURE — 83090 ASSAY OF HOMOCYSTEINE: CPT

## 2022-05-25 PROCEDURE — 80053 COMPREHEN METABOLIC PANEL: CPT

## 2022-05-25 PROCEDURE — G0103 PSA SCREENING: HCPCS

## 2022-05-26 LAB — EPO SERPL-ACNC: 80.6 MIU/ML (ref 2.6–18.5)

## 2022-06-01 ENCOUNTER — OFFICE VISIT (OUTPATIENT)
Dept: UROLOGY | Facility: CLINIC | Age: 77
End: 2022-06-01
Payer: MEDICARE

## 2022-06-01 VITALS
SYSTOLIC BLOOD PRESSURE: 100 MMHG | WEIGHT: 154 LBS | HEIGHT: 68 IN | BODY MASS INDEX: 23.34 KG/M2 | DIASTOLIC BLOOD PRESSURE: 60 MMHG

## 2022-06-01 DIAGNOSIS — Z12.5 SCREENING FOR PROSTATE CANCER: Primary | ICD-10-CM

## 2022-06-01 DIAGNOSIS — R33.9 INCOMPLETE EMPTYING OF BLADDER: ICD-10-CM

## 2022-06-01 DIAGNOSIS — N18.9 CHRONIC KIDNEY DISEASE, UNSPECIFIED CKD STAGE: ICD-10-CM

## 2022-06-01 LAB
POST-VOID RESIDUAL VOLUME, ML POC: 140 ML
SL AMB  POCT GLUCOSE, UA: NORMAL
SL AMB LEUKOCYTE ESTERASE,UA: NORMAL
SL AMB POCT BILIRUBIN,UA: NORMAL
SL AMB POCT BLOOD,UA: NORMAL
SL AMB POCT CLARITY,UA: CLEAR
SL AMB POCT COLOR,UA: YELLOW
SL AMB POCT KETONES,UA: NORMAL
SL AMB POCT NITRITE,UA: NORMAL
SL AMB POCT PH,UA: 5
SL AMB POCT SPECIFIC GRAVITY,UA: 1
SL AMB POCT URINE PROTEIN: NORMAL
SL AMB POCT UROBILINOGEN: 0.2

## 2022-06-01 PROCEDURE — 51798 US URINE CAPACITY MEASURE: CPT | Performed by: PHYSICIAN ASSISTANT

## 2022-06-01 PROCEDURE — 81002 URINALYSIS NONAUTO W/O SCOPE: CPT | Performed by: PHYSICIAN ASSISTANT

## 2022-06-01 PROCEDURE — 99213 OFFICE O/P EST LOW 20 MIN: CPT | Performed by: PHYSICIAN ASSISTANT

## 2022-06-01 NOTE — PROGRESS NOTES
1  Screening for prostate cancer  POCT urine dip    POCT Measure PVR   2  Chronic kidney disease, unspecified CKD stage  Ambulatory Referral to Nephrology   3  Incomplete emptying of bladder  Basic metabolic panel          Assessment and plan:     1  BPH with lower urinary tract symptoms  -  Suspect median lobe given most recent ultrasound findings  - patient's postvoid residual has improved with finasteride  His urinary symptoms have improved as well  He will continue on this indefinitely      2  Prostate cancer screening  - PSA and WILLEM normal   No further screening needed    3  CKD  - nephrology referral      Donya Keenan PA-C      Chief Complaint     BPH    History of Present Illness     Sabiha Mike is a 78 y o  Male presenting for f/u of BPH  Patient had a recent ultrasound of the kidney and bladder 10/19/2021 for evaluation of his chronic kidney disease  There is no evidence of upper tract abnormality or obstruction  Urinary wall appears trabeculated however not particularly thickened  Prostatomegaly appreciated with evidence of a median lobe  patient reports that he is overall happy with his urination and describes it as "perfect"  Denies any dysuria, gross hematuria, suprapubic pressure, flank pain, nausea, vomiting, fevers, chills  Denies any previous history of  surgical manipulation  Currently is not on any pharmacotherapy for his prostate or bladder  Medical comorbidities include cardiomyopathy, COPD, pancytopenia, history of stroke, history of tobacco abuse,  and aphasia  Patient is on a daily diuretic as well as aspirin  At his last visit he was started on finasteride  Avoided alpha blockade due to concerns of hypotension  Patient has noted urinary improvement and is very happy with his urination  PSA 0 6 (5/25/22)    Urine dip leukocyte, nitrite, blood negative     PVR 140mL    AUA SYMPTOM SCORE    Flowsheet Row Most Recent Value   AUA SYMPTOM SCORE How often have you had a sensation of not emptying your bladder completely after you finished urinating? 0 (P)     How often have you had to urinate again less than two hours after you finished urinating? 0 (P)     How often have you found you stopped and started again several times when you urinate? 0 (P)     How often have you found it difficult to postpone urination? 0 (P)     How often have you had a weak urinary stream? 0 (P)     How often have you had to push or strain to begin urination? 0 (P)     How many times did you most typically get up to urinate from the time you went to bed at night until the time you got up in the morning? 0 (P)     Quality of Life: If you were to spend the rest of your life with your urinary condition just the way it is now, how would you feel about that? 2 (P)     AUA SYMPTOM SCORE 0 (P)             Laboratory     Lab Results   Component Value Date    CREATININE 1 61 (H) 05/25/2022       Review of Systems     Review of Systems   Constitutional: Negative for activity change, appetite change, chills, diaphoresis, fatigue, fever and unexpected weight change  Respiratory: Negative for chest tightness and shortness of breath  Cardiovascular: Positive for leg swelling  Negative for chest pain and palpitations  Gastrointestinal: Negative for abdominal distention, abdominal pain, constipation, diarrhea, nausea and vomiting  Genitourinary: Negative for decreased urine volume, difficulty urinating, dysuria, enuresis, flank pain, frequency, genital sores, hematuria and urgency  Musculoskeletal: Negative for back pain, gait problem and myalgias  Skin: Negative for color change, pallor, rash and wound  Psychiatric/Behavioral: Negative for behavioral problems  The patient is not nervous/anxious  Allergies     No Known Allergies    Physical Exam     Physical Exam  Constitutional:       General: He is not in acute distress  Appearance: Normal appearance   He is not ill-appearing, toxic-appearing or diaphoretic  HENT:      Head: Normocephalic and atraumatic  Eyes:      General:         Right eye: No discharge  Left eye: No discharge  Conjunctiva/sclera: Conjunctivae normal    Pulmonary:      Effort: Pulmonary effort is normal  No respiratory distress  Genitourinary:     Comments: Good rectal tone  Prostate 50g,  Smooth, no palpable nodules  Musculoskeletal:         General: No tenderness  Normal range of motion  Right lower leg: Edema present  Left lower leg: Edema present  Skin:     General: Skin is warm and dry  Coloration: Skin is not jaundiced or pale  Neurological:      Mental Status: He is alert  Psychiatric:         Mood and Affect: Mood normal          Behavior: Behavior normal          Thought Content:  Thought content normal            Vital Signs     Vitals:    06/01/22 1244   BP: 100/60   BP Location: Left arm   Patient Position: Sitting   Cuff Size: Adult   Weight: 69 9 kg (154 lb)   Height: 5' 8" (1 727 m)         Current Medications       Current Outpatient Medications:     albuterol (Ventolin HFA) 90 mcg/act inhaler, Inhale 2 puffs every 4 (four) hours as needed for wheezing or shortness of breath, Disp: 18 g, Rfl: 0    aspirin (ECOTRIN LOW STRENGTH) 81 mg EC tablet, Take 81 mg by mouth daily, Disp: , Rfl:     atorvastatin (LIPITOR) 20 mg tablet, TAKE 1 TABLET BY MOUTH IN  THE EVENING, Disp: 90 tablet, Rfl: 3    cefdinir (OMNICEF) 300 mg capsule, Take 1 capsule (300 mg total) by mouth every 12 (twelve) hours for 7 days, Disp: 14 capsule, Rfl: 0    Entresto  MG TABS, TAKE 1 TABLET BY MOUTH  TWICE DAILY, Disp: 180 tablet, Rfl: 3    finasteride (PROSCAR) 5 mg tablet, Take 1 tablet (5 mg total) by mouth daily, Disp: 90 tablet, Rfl: 2    furosemide (LASIX) 20 mg tablet, Take 1 tablet (20 mg total) by mouth 2 (two) times a day, Disp: 60 tablet, Rfl: 1    Incruse Ellipta 62 5 MCG/INH AEPB inhaler, USE 1 INHALATION BY MOUTH  DAILY, Disp: 30 blister, Rfl: 3    metoprolol succinate (TOPROL-XL) 25 mg 24 hr tablet, Take 1 tablet (25 mg total) by mouth daily, Disp: 90 tablet, Rfl: 3    tiotropium-olodaterol (Stiolto Respimat) 2 5-2 5 MCG/ACT inhaler, Inhale 2 puffs  in the morning , Disp: 4 g, Rfl: 0    Ventolin  (90 Base) MCG/ACT inhaler, USE 2 INHALATIONS BY MOUTH  EVERY 6 HOURS AS NEEDED FOR WHEEZING, Disp: 72 g, Rfl: 3    fluticasone-umeclidinium-vilanterol (Trelegy Ellipta) 100-62 5-25 MCG/INH inhaler, Inhale 1 puff daily for 14 days Rinse mouth after use , Disp: 28 blister, Rfl: 0      Active Problems     Patient Active Problem List   Diagnosis    NICM [facility]nonischemic cardiomyopathy) ([facility] )    Essential hypertension    Dyspnea on exertion    Pulmonary emphysema (HCC)    Abnormal CXR    Tobacco abuse in remission    Aphasia    Pulmonary nodule    History of stroke    Slurred speech    Pancytopenia (HCC)    Unhealthy alcohol drinking behavior    B12 deficiency    Aplastic anemia (HCC)    Chronic left shoulder pain    Abnormal CT of the chest    Weight loss         Past Medical History     Past Medical History:   Diagnosis Date    Cardiomyopathy ([facility] )     CHF (congestive heart failure) (HCC)     COPD (chronic obstructive pulmonary disease) (HCC)     Dyspnea on exertion     Essential hypertension     [facility] )          Surgical History     Past Surgical History:   Procedure Laterality Date    IR BIOPSY BONE MARROW  5/26/2021    PILONIDAL CYST EXCISION           Family History     Family History   Problem Relation Age of Onset    Breast cancer Mother 55    Hypertension Father     Cancer Father     Cancer Family     Heart disease Maternal Uncle     Coronary artery disease Maternal Uncle     Heart failure Maternal Uncle     Breast cancer Sister 48    Heart attack Neg Hx     Stroke Neg Hx     Anuerysm Neg Hx     Arrhythmia Neg Hx     Clotting disorder Neg Hx     Hyperlipidemia Neg Hx          Social History     Social History       Radiology

## 2022-06-13 ENCOUNTER — TELEPHONE (OUTPATIENT)
Dept: NEPHROLOGY | Facility: CLINIC | Age: 77
End: 2022-06-13

## 2022-06-13 NOTE — TELEPHONE ENCOUNTER
New Patient Intake Form   Patient Details   Marck Gallardo     1945     [id number]     Appointment Information   Who is calling to schedule? If not patient, what is callers name? Garfield Nieto     Referring Provider Romayne Negro, PA-C   Referring Provider Number [id number]   Reason for Appt (Diagnosis) CKD, unspecified ckd stage   Is patient aware of why they are being referred? yes   Does Patient have labs done at [place] [address]? If not, where do they go? yes   Has patient had labs / urine work done? List date of most recent lab / urine work yes   Has patient had a BMP & CBC done in the past 2 years? If so, list the date yes   Has patient been hospitalized recently? If yes, list name and location of hospital they were in no   Has patient been seen by a Nephrologist before? If yes, list name, location and phone number no   Has patient been see by another Specialty before (ex  Neurology, urology, cardiology)? If yes, please list name, and specialty  urology Jeni Agosto   Neurology Dr Tila Nichole   Has the patient had imaging done? If so, list the most recent date and type of imaging yes   Does the patient has a stone analysis report if history of kidney stones? n/a   Appointment Details   Is there a referral on file?  yes   Appointment Date  09/29   [place] [facility] [place]    Miscellaneous

## 2022-07-04 NOTE — PROGRESS NOTES
Pulmonary Follow Up Note   Tone Sosa 78 y o  male MRN: [id number]  7/6/2022      Assessment:  1  Abnormal CT Chest  · I personally reviewed repeat images with him and his wife  · Overall improving, will plan repeat CT in 3 months  · Continue to monitor weight, increase PO intake, Ensure/boost     2  Severe COPD with acute exacerbation  · GOLD Stage III, Class C - FEV1 1 06L 35% 10/2018  · Evidence of air trapping on PFTs  · Difficulty with Stiolto, will give trial of Anoro once Incruse supply is completed  · Repeat spirometry with next visit  · Flu Vaccine 2021 pneumovax 2017, prevnar 2015, COVID-19 x 3  · Follow up in 3 months or sooner as needed     3  Pulmonary Nodules/infiltrates  · As above, await formal radiology read on CT     4  Nicotine dependence in sustained remission  · Remain tobacco free, LDCT annually     5  Non-ischemic Cardiomyopathy EF 20-25% - resuming lasix therapy now and repeat BMP this week     6  Expressive Aphasia - further care per neurology, no sig improvement with speech therapy     7  Pancytopenia - now diagnosed with aplastic anemia and follows with hematology    Plan:    Diagnoses and all orders for this visit:    Abnormal CT of the chest  -     CT chest wo contrast; Future    Centrilobular emphysema (HCC)  -     umeclidinium-vilanterol (Anoro Ellipta) 62 5-25 MCG/INH inhaler; Inhale 1 puff daily    NICM (nonischemic cardiomyopathy) (HCC)    Aplastic anemia (HCC)    Tobacco abuse in remission    History of stroke        Return in about 4 months (around 11/6/2022) for Recheck  History of Present Illness   HPI:  Tone Sosa is a 78 y o  male who presented for dyspnea and COPD evaluation by Dr Romelia Gant  He has history of NICM EF 10-20% and HTN  Rayma Counter was diagnosed with aplastic anemia in 2021 and on observation status  He was initially seen in Jan 2019 and diagnosed with severe COPD and did not require supplemental oxygen  He was last seen in May 2022 for sick visit  Concern for smoldering infection based upon CT and symptoms - he was to complete couse of cefdinir and repeat CT  He presents today for follow up  He presents with his wife  Sputum production has resolved, cough has improved, some improvement in dyspnea with Stiolto but difficulty using  He denied hemoptysis  They deny cough with eating or aspiration events  He is using CLAYTON once daily      Review of Systems   Constitutional: Positive for fatigue  Negative for activity change, appetite change and fever  HENT: Negative for ear pain, postnasal drip, rhinorrhea, sneezing, sore throat and trouble swallowing  Respiratory: Positive for shortness of breath and wheezing  Negative for cough and chest tightness  Cardiovascular: Positive for leg swelling  Negative for chest pain  Gastrointestinal: Negative for abdominal pain, nausea and vomiting  Musculoskeletal: Negative for gait problem and myalgias  Allergic/Immunologic: Positive for immunocompromised state  Neurological: Positive for headaches  Hematological: Negative for adenopathy  Psychiatric/Behavioral: Negative for sleep disturbance  The patient is not nervous/anxious          Historical Information   Past Medical History:   Diagnosis Date    Cardiomyopathy [facility])     CHF (congestive heart failure) (HCC)     COPD (chronic obstructive pulmonary disease) (HCC)     Dyspnea on exertion     Essential hypertension     Non-ischemic cardiomyopathy (HCC)      Past Surgical History:   Procedure Laterality Date    IR BIOPSY BONE MARROW  5/26/2021    PILONIDAL CYST EXCISION       Family History   Problem Relation Age of Onset    Breast cancer Mother 55    Hypertension Father     Cancer Father     Cancer Family     Heart disease Maternal Uncle     Coronary artery disease Maternal Uncle     Heart failure Maternal Uncle     Breast cancer Sister 48    Heart attack Neg Hx     Stroke Neg Hx     Anuerysm Neg Hx     Arrhythmia Neg Hx     Clotting disorder Neg Hx     Hyperlipidemia Neg Hx          Meds/Allergies     Current Outpatient Medications:     aspirin (ECOTRIN LOW STRENGTH) 81 mg EC tablet, Take 81 mg by mouth daily, Disp: , Rfl:     atorvastatin (LIPITOR) 20 mg tablet, TAKE 1 TABLET BY MOUTH IN  THE EVENING, Disp: 90 tablet, Rfl: 3    Entresto  MG TABS, TAKE 1 TABLET BY MOUTH  TWICE DAILY, Disp: 180 tablet, Rfl: 3    finasteride (PROSCAR) 5 mg tablet, Take 1 tablet (5 mg total) by mouth daily, Disp: 90 tablet, Rfl: 2    furosemide (LASIX) 20 mg tablet, Take 1 tablet (20 mg total) by mouth 2 (two) times a day, Disp: 60 tablet, Rfl: 1    Incruse Ellipta 62 5 MCG/INH AEPB inhaler, USE 1 INHALATION BY MOUTH  DAILY, Disp: 30 blister, Rfl: 3    metoprolol succinate (TOPROL-XL) 25 mg 24 hr tablet, Take 1 tablet (25 mg total) by mouth daily, Disp: 90 tablet, Rfl: 3    umeclidinium-vilanterol (Anoro Ellipta) 62 5-25 MCG/INH inhaler, Inhale 1 puff daily, Disp: 60 blister, Rfl: 3    Ventolin  (90 Base) MCG/ACT inhaler, USE 2 INHALATIONS BY MOUTH  EVERY 6 HOURS AS NEEDED FOR WHEEZING, Disp: 72 g, Rfl: 3    fluticasone-umeclidinium-vilanterol (Trelegy Ellipta) 100-62 5-25 MCG/INH inhaler, Inhale 1 puff daily for 14 days Rinse mouth after use , Disp: 28 blister, Rfl: 0    tiotropium-olodaterol (Stiolto Respimat) 2 5-2 5 MCG/ACT inhaler, Inhale 2 puffs  in the morning  (Patient not taking: Reported on 7/6/2022), Disp: 4 g, Rfl: 0  No Known Allergies    Vitals: Blood pressure 90/58, pulse 105, temperature 97 8 °F (36 6 °C), temperature source Tympanic, resp  rate 18, height 5' 8" (1 727 m), weight 66 7 kg (147 lb), SpO2 97 %  Body mass index is 22 35 kg/m²  Oxygen Therapy  SpO2: 97 %  Oxygen Therapy: None (Room air)      Physical Exam  Physical Exam  Vitals reviewed  Constitutional:       General: He is not in acute distress  Appearance: Normal appearance  He is well-developed and normal weight   He is not ill-appearing, toxic-appearing or diaphoretic  HENT:      Head: Normocephalic and atraumatic  Right Ear: External ear normal       Left Ear: External ear normal       Nose: Nose normal  No congestion  Mouth/Throat:      Mouth: Mucous membranes are moist       Pharynx: Oropharynx is clear  No oropharyngeal exudate  Eyes:      General: No scleral icterus  Right eye: No discharge  Left eye: No discharge  Conjunctiva/sclera: Conjunctivae normal       Pupils: Pupils are equal, round, and reactive to light  Neck:      Vascular: No JVD  Trachea: No tracheal deviation  Cardiovascular:      Rate and Rhythm: Normal rate and regular rhythm  Heart sounds: Normal heart sounds  No murmur heard  Pulmonary:      Effort: Pulmonary effort is normal  No respiratory distress  Breath sounds: No stridor  No wheezing, rhonchi or rales  Comments: Mildly diminished BS, no wheeze no rales  Abdominal:      General: Bowel sounds are normal  There is no distension  Palpations: Abdomen is soft  Tenderness: There is no abdominal tenderness  There is no guarding  Musculoskeletal:         General: No swelling or deformity  Cervical back: Neck supple  Right lower leg: Edema present  Left lower leg: Edema present  Comments: 1+ pedal edema b/l   Lymphadenopathy:      Cervical: No cervical adenopathy  Skin:     General: Skin is warm and dry  Capillary Refill: Capillary refill takes less than 2 seconds  Findings: No rash  Neurological:      Mental Status: He is alert and oriented to person, place, and time  Psychiatric:         Mood and Affect: Mood normal          Behavior: Behavior normal          Thought Content: Thought content normal          Labs: I have personally reviewed pertinent lab results    Lab Results   Component Value Date    WBC 4 05 (L) 05/25/2022    HGB 8 3 (L) 05/25/2022    HCT 26 3 (L) 05/25/2022     (H) 05/25/2022    PLT 63 (L) 05/25/2022     Lab Results   Component Value Date    GLUCOSE 100 10/22/2015    CALCIUM 8 9 05/25/2022     10/22/2015    K 4 1 05/25/2022    CO2 29 05/25/2022     05/25/2022    BUN 43 (H) 05/25/2022    CREATININE 1 61 (H) 05/25/2022     No results found for: IGE  Lab Results   Component Value Date    ALT 13 05/25/2022    AST 14 05/25/2022    ALKPHOS 64 05/25/2022    BILITOT 0 69 10/22/2015       Imaging and other studies: New images and reports personally reviewed  CT Chest 7/5/2022 - improving RUL nodular infiltrate, new LLL nodules and mild alveolar infiltrates, background emphysema formal radiology read pending    CT Chest 5/23/2022 - background emphysema, new RUL nodular peripheral infiltrate, fluid right fissure, nodular infiltrate distal lingula, no sig mediastinal adenopathy, no PTX     CT Chest 5/23/21 - background emphysema, resolved RLL nodule, no new lesions     CT Chest 7/6/2020 - background emphysema, stable subpleural scarring in RML and lingula, RLL subpleural nodule noted, bilateral calcified pleural plaques     CT Chest 7/18/19 - resolved LLL pneumonia with resolving RLL nodule and likely residual scar, background upper lobe emphysematous changes, bilateral pleural plaques     CT/PET - 4/17/19 - 1 3x0 8cm RLL nodule with SUV 1 4, 4mm RLL nodule, slight nodular density LLL, no significant mediastinal adenopathy     CT Chest 4/5/19 - noted upper lobe predominant emphysematous changes with 1 2cm RLL spiculated nodule, trace right effusion within fissure, no significant mediastinal adenopathy     CXR 10/22/18 - hyperinflation, flat HD and enlarged anterior/posterior clear space, blunted right CP angle with obscured right HD and suggestion of basilar scarring, no PTX, mild volume loss on right     Pulmonary function testing:   10/22/18 - Ratio 47%, FVC 2 25L (55%), FEV1 1 06L (35%), FEV1 inc 1% post BD, TLC 88%, %, DLCO 19% - severe obstructive airflow defect with reduced VC, normal TLC with increased RV indicative of air trapping and severely reduced diffusion     01/14/19 - 6MWT on RA - total walk distance 397 meters, initial SpO2 97%, romeo SpO2 94%, at conclusion 95%, initial HR 85bpm, maximal HR 107bpm, Ruben Dyspnea Scale 0/10-->1/10     EKG, Pathology, and Other Studies: I have personally reviewed pertinent reports     TTE 9/2021 - EF 20-25%, mild dilated RV, PASP 37mmHg     TTE 1/2019 - TTE EF 10%, PASP 51mmHg, dilated RV     TTE 2014 - EF 20%, normal RV, PASP in normal range    Ross Banda, DO, FACP  [facility]    Answers for HPI/ROS submitted by the patient on 6/29/2022  Do you have difficulty breathing?: Yes  Do you have a hoarse voice?: Yes  Do you have shortness of breath that occurs with effort or exertion?: Yes  Do you have ear congestion?: No  Do you have heartburn?: No  Do you have fatigue?: Yes  Do you have nasal congestion?: No  Do you have shortness of breath when lying flat?: Yes  Do you have shortness of breath when you wake up?: No  Do you have sweats?: No  Have you experienced weight loss?: No  Which of the following makes your symptoms worse?: change in weather, climbing stairs, pollen  Which of the following makes your symptoms better?: steroid inhaler  Risk factors for lung disease: occupational exposure, smoking/tobacco exposure

## 2022-07-05 ENCOUNTER — HOSPITAL ENCOUNTER (OUTPATIENT)
Dept: CT IMAGING | Facility: HOSPITAL | Age: 77
Discharge: HOME | End: 2022-07-05
Attending: INTERNAL MEDICINE
Payer: MEDICARE

## 2022-07-05 DIAGNOSIS — R93.89 ABNORMAL CT OF THE CHEST: ICD-10-CM

## 2022-07-05 PROCEDURE — G1004 CDSM NDSC: HCPCS

## 2022-07-05 PROCEDURE — 71250 CT THORAX DX C-: CPT

## 2022-07-06 ENCOUNTER — OFFICE VISIT (OUTPATIENT)
Dept: PULMONOLOGY | Facility: CLINIC | Age: 77
End: 2022-07-06
Payer: MEDICARE

## 2022-07-06 VITALS
TEMPERATURE: 97.8 F | SYSTOLIC BLOOD PRESSURE: 90 MMHG | HEIGHT: 68 IN | HEART RATE: 105 BPM | DIASTOLIC BLOOD PRESSURE: 58 MMHG | RESPIRATION RATE: 18 BRPM | OXYGEN SATURATION: 97 % | WEIGHT: 147 LBS | BODY MASS INDEX: 22.28 KG/M2

## 2022-07-06 DIAGNOSIS — R93.89 ABNORMAL CT OF THE CHEST: Primary | ICD-10-CM

## 2022-07-06 DIAGNOSIS — D61.9 APLASTIC ANEMIA (HCC): ICD-10-CM

## 2022-07-06 DIAGNOSIS — I42.8 NICM (NONISCHEMIC CARDIOMYOPATHY) (HCC): ICD-10-CM

## 2022-07-06 DIAGNOSIS — Z86.73 HISTORY OF STROKE: ICD-10-CM

## 2022-07-06 DIAGNOSIS — Z72.0 TOBACCO ABUSE: ICD-10-CM

## 2022-07-06 DIAGNOSIS — J43.2 CENTRILOBULAR EMPHYSEMA (HCC): ICD-10-CM

## 2022-07-06 PROCEDURE — 99214 OFFICE O/P EST MOD 30 MIN: CPT | Performed by: INTERNAL MEDICINE

## 2022-07-07 ENCOUNTER — TELEPHONE (OUTPATIENT)
Dept: HEMATOLOGY ONCOLOGY | Facility: CLINIC | Age: 77
End: 2022-07-07

## 2022-07-11 ENCOUNTER — OFFICE VISIT (OUTPATIENT)
Dept: HEMATOLOGY ONCOLOGY | Facility: CLINIC | Age: 77
End: 2022-07-11
Payer: MEDICARE

## 2022-07-11 VITALS
DIASTOLIC BLOOD PRESSURE: 66 MMHG | SYSTOLIC BLOOD PRESSURE: 100 MMHG | HEIGHT: 68 IN | WEIGHT: 153 LBS | BODY MASS INDEX: 23.19 KG/M2 | OXYGEN SATURATION: 98 % | HEART RATE: 116 BPM | RESPIRATION RATE: 14 BRPM | TEMPERATURE: 97.8 F

## 2022-07-11 DIAGNOSIS — D61.9 APLASTIC ANEMIA (HCC): Primary | ICD-10-CM

## 2022-07-11 DIAGNOSIS — D61.818 PANCYTOPENIA (HCC): ICD-10-CM

## 2022-07-11 PROCEDURE — 99214 OFFICE O/P EST MOD 30 MIN: CPT | Performed by: INTERNAL MEDICINE

## 2022-07-11 RX ORDER — PREDNISONE 20 MG/1
20 TABLET ORAL DAILY
Qty: 30 TABLET | Refills: 3 | Status: SHIPPED | OUTPATIENT
Start: 2022-07-11

## 2022-07-11 NOTE — PROGRESS NOTES
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     He had been on watchful observation because of COPD, cardiomyopathy with EF of 10% with improvement slowly he has expressive aphasia, we decided at this time to start prednisone 20 mg p o  daily for 2 months and then repeat CBC and proceed from there    [address] panel        Labs and imaging studies are reviewed by ordering provider once results are available  If there are findings that need immediate attention, you will be contacted when results available  Discussing results and the implication on your healthcare is best discussed in person at your follow-up visit  HPI:     Tonie Martin is a 79 y/o man seen initially by hematology 5/2021 regarding pancytopenia  04/20/2021: WBC 4 07, Hgb 9 4g/dL, , platelet 94, ANC 0 60, normal differential  Patient has had these blood work findings since approximately 4/2018      Significant cardiopulmonary history  He has severe COPD with acute exacerbation  Follows with Dr Brandon Braxton  He has history of Pulmonary Nodule also followed by Dr Brandon Braxton  1 3 cm with SUV 1 4 on 7/2019 CT Decreased size on repeat CT Chest July 2019 and resolved on May 2021  Annual LDCT chest annually due May 2022     History of Non-ischemic Cardiomyopathy and advanced heart failure  EF was 10%; improved to 20-25% 9/2021  Follows with Dr Maeve Palma    Not interested in ICD/BiV - even if would improve symptoms   Does not want to have any major intervention     Chronic PATTEN > 20   Smoked 1 -1  5PPD x 50 years quit 2018  History of expressive aphasia since 2018  MRI from April of 2019 there was mention made of an area of scarring/encephalomalacia in the left frontoparietal junction which is consistent with an old/chronic stroke      Since 2019, creatinine and BUN elevated  He drank 2 cans of beer a day  Retired, worked at [employer], also worked at [employer]  He does have a family history of breast cancer in mother and sister  Lung cancer history in father and brother  He has never had a colonoscopy before       Not prone to frequent infections  No easy bruising or bleeding        Initial workup 5/2021 revealed nonreactive HIV, normal haptoglobin, normal reticulocyte count 1 09, no monoclonal band on SPEP, TSH, iron panel     BMBX 5/26/21: 55 XY, 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 (p [id number]) was detected in this patient's sample  Hypocellular marrow (5% cellularity) with trilineage hematopoiesis  No evidence for leukemia or lymphoma  Increased iron stores      Patient was observed due to stability of counts, ANC> 500 and no frequent infection or bleeding  Patient did discontinue alcohol use 2021  B12 level did improve  Interval History:        Previous Treatment:         Test Results:    Imaging: CT chest wo contrast    Result Date: 7/7/2022  Narrative: CT CHEST WITHOUT IV CONTRAST INDICATION:   R93 89: Abnormal findings on diagnostic imaging of other specified body structures  "Right lung infiltrates; abnormal x-ray -lung opacity/opacities " Per my review of the medical record, the patient  has a history of severe COPD and abnormal CT in May 2022  COMPARISON:  Chest radiograph from 10/22/2018, chest CT from 5/23/2022, 5/23/2021, 7/6/2020, 7/18/2019, 4/5/2019  TECHNIQUE: Chest CT without intravenous contrast   Axial, sagittal, coronal 2D reformats and coronal MIPS from source data   Radiation dose length product (DLP): 166 mGy-cm   Radiation dose exposure minimized using iterative reconstruction and automated exposure control  FINDINGS: LUNGS: Compromised by motion  Resolution of juxtapleural right upper lobe and inferior lingular consolidation with residual scar  Multiple waxing and waning nodules over serial exams dating back to April 2019 with enlarging left lower lobe nodule from 7 x 4 mm in May 2022 to 1 8 x 0 7 cm (3/90)  Multiple new left lower lobe nodules  8mm right lower lobe paraspinal nodule which has increased since May 2022 but has waxed and waned and resolved completely on prior exams, likely benign intrapulmonary lymph node  Moderate centrilobular emphysema  Small chronic loculated right pleural effusion which tracks into the major fissure  Pleural calcification  AIRWAYS: No significant filling defects  PLEURA:  Unremarkable  HEART/GREAT VESSELS:  Mild cardiomegaly  Moderate coronary artery calcification indicating atherosclerotic heart disease  Pulmonary artery enlargement  MEDIASTINUM AND MAGGIE:  Unremarkable  CHEST WALL AND LOWER NECK: Unremarkable  UPPER ABDOMEN:  Unremarkable  OSSEOUS STRUCTURES: Moderate degenerative disease in the spine  Impression: Regression of right upper lobe and lingular consolidation with residual scar indicating an infectious or inflammatory etiology Multiple waxing and waning nodules dating back to April 2019 with enlarging left lower lobe nodule  and multiple new left lower lobe nodules since May 2022, likely inflammatory  Small chronic loculated right pleural effusion with pleural calcification which could indicate asbestos related pleural disease  Pulmonary artery enlargement which can be seen with pulmonary hypertension    Workstation performed: [id number]       Labs:   Lab Results   Component Value Date    WBC 4 05 (L) 05/25/2022    HGB 8 3 (L) 05/25/2022    HCT 26 3 (L) 05/25/2022     (H) 05/25/2022    PLT 63 (L) 05/25/2022     Lab Results   Component Value Date  10/22/2015    K 4 1 05/25/2022     05/25/2022    CO2 29 05/25/2022    ANIONGAP 3 (L) 10/22/2015    BUN 43 (H) 05/25/2022    CREATININE 1 61 (H) 05/25/2022    GLUCOSE 100 10/22/2015    GLUF 104 (H) 05/25/2022    CALCIUM 8 9 05/25/2022    CORRECTEDCA 9 6 04/20/2021    AST 14 05/25/2022    ALT 13 05/25/2022    ALKPHOS 64 05/25/2022    PROT 7 7 10/22/2015    BILITOT 0 69 10/22/2015    EGFR 41 05/25/2022       Lab Results   Component Value Date    IRON 63 (L) 05/25/2022    TIBC 288 05/25/2022    FERRITIN 190 05/25/2022       Lab Results   Component Value Date    [id number] 736 11/18/2021         ROS: Review of Systems   Constitutional: Positive for appetite change and fatigue  Negative for chills, diaphoresis, fever and unexpected weight change  HENT:   Negative for hearing loss, lump/mass, mouth sores, nosebleeds, sore throat, trouble swallowing and voice change  Eyes: Negative  Negative for eye problems and icterus  Respiratory: Negative  Negative for chest tightness, cough, hemoptysis and shortness of breath  Cardiovascular: Negative for chest pain and leg swelling  Gastrointestinal: Negative for abdominal distention, abdominal pain, blood in stool, constipation, diarrhea and nausea  Endocrine: Negative  Genitourinary: Negative for dysuria, frequency, hematuria and pelvic pain  Musculoskeletal: Negative  Negative for arthralgias, back pain, flank pain, gait problem, myalgias and neck stiffness  Skin: Negative for itching and rash  Neurological: Negative for dizziness, gait problem, headaches, light-headedness, numbness and speech difficulty  Hematological: Negative for adenopathy  Does not bruise/bleed easily  Psychiatric/Behavioral: Negative for confusion, decreased concentration, depression and sleep disturbance  The patient is not nervous/anxious             Current Medications: Reviewed  Allergies: Reviewed  PMH/FH/SH:  Reviewed      Physical Exam:    Body surface area is 1 82 meters squared  Wt Readings from Last 3 Encounters:   22 69 4 kg (153 lb)   22 66 7 kg (147 lb)   22 69 9 kg (154 lb)        Temp Readings from Last 3 Encounters:   22 97 8 °F (36 6 °C) (Temporal)   22 97 8 °F (36 6 °C) (Tympanic)   22 (!) 96 3 °F (35 7 °C) (Tympanic)        BP Readings from Last 3 Encounters:   22 100/66   22 90/58   22 100/60         Pulse Readings from Last 3 Encounters:   22 (!) 116   22 105   22 85        Physical Exam  Vitals reviewed  Constitutional:       General: He is not in acute distress  Appearance: He is well-developed  He is not diaphoretic  HENT:      Head: Normocephalic and atraumatic  Eyes:      Conjunctiva/sclera: Conjunctivae normal    Neck:      Trachea: No tracheal deviation  Cardiovascular:      Rate and Rhythm: Normal rate and regular rhythm  Heart sounds: No murmur heard  No friction rub  No gallop  Pulmonary:      Effort: Pulmonary effort is normal  No respiratory distress  Breath sounds: Normal breath sounds  No wheezing or rales  Chest:      Chest wall: No tenderness  Abdominal:      General: There is no distension  Palpations: Abdomen is soft  Tenderness: There is no abdominal tenderness  Musculoskeletal:      Cervical back: Normal range of motion and neck supple  Right lower leg: Edema present  Left lower leg: Edema present  Lymphadenopathy:      Cervical: No cervical adenopathy  Skin:     General: Skin is warm and dry  Coloration: Skin is not pale  Findings: No erythema  Neurological:      Mental Status: He is alert and oriented to person, place, and time  Motor: Weakness present  Gait: Gait abnormal    Psychiatric:         Behavior: Behavior normal          Thought Content: Thought content normal          Judgment: Judgment normal          ECO  Goals and Barriers:  Current Goal: Minimize effects of disease  Barriers: None  Patient's Capacity to Self Care:  Patient is able to self care      Code Status: [facility]@

## 2022-07-14 ENCOUNTER — OFFICE VISIT (OUTPATIENT)
Dept: CARDIOLOGY CLINIC | Facility: CLINIC | Age: 77
End: 2022-07-14
Payer: MEDICARE

## 2022-07-14 VITALS
OXYGEN SATURATION: 99 % | WEIGHT: 151 LBS | DIASTOLIC BLOOD PRESSURE: 50 MMHG | HEART RATE: 100 BPM | BODY MASS INDEX: 22.88 KG/M2 | HEIGHT: 68 IN | SYSTOLIC BLOOD PRESSURE: 92 MMHG

## 2022-07-14 DIAGNOSIS — I10 ESSENTIAL HYPERTENSION: ICD-10-CM

## 2022-07-14 DIAGNOSIS — I50.22 CHRONIC SYSTOLIC CONGESTIVE HEART FAILURE (HCC): ICD-10-CM

## 2022-07-14 DIAGNOSIS — I42.8 NICM (NONISCHEMIC CARDIOMYOPATHY) (HCC): Primary | ICD-10-CM

## 2022-07-14 PROCEDURE — 99214 OFFICE O/P EST MOD 30 MIN: CPT | Performed by: INTERNAL MEDICINE

## 2022-07-14 RX ORDER — FUROSEMIDE 20 MG/1
20 TABLET ORAL 2 TIMES DAILY
Qty: 180 TABLET | Refills: 3 | Status: SHIPPED | OUTPATIENT
Start: 2022-07-14

## 2022-07-14 RX ORDER — SACUBITRIL AND VALSARTAN 49; 51 MG/1; MG/1
1 TABLET, FILM COATED ORAL 2 TIMES DAILY
Qty: 180 TABLET | Refills: 3
Start: 2022-07-14

## 2022-07-14 NOTE — PATIENT INSTRUCTIONS
Recommendations:  1  Discontinue atorvastatin  2  Decrease Entresto to 49/51mg 2x/day  3  Continue remainder of medications  4  Follow up in 3 months

## 2022-07-14 NOTE — PROGRESS NOTES
Cardiology   Ezekiel Ponce 78 y o  male MRN: [id number]        Impression:  1  Non-ischemic cardiomyopathy - patient with NYHA class III systolic heart failure  EF 10% - improved to 25% with recent echo 9/21  Not interested in ICD/BiV - even if would improve symptoms   Does not want to have any major intervention     2  Hypertension - now relatively hypotensive  3  Tobacco usage - quit  4  COPD - started on Ellipita     5  Word finding difficulties - Likely from CVA         Recommendations:  1  Discontinue atorvastatin  2  Decrease Entresto to 49/51mg 2x/day  3  Continue remainder of medications  4  Follow up in 3 months           HPI: Ezekiel Ponce is a 78y o  year old male with non-ischemic cardiomyopathy returns for follow up  Eduardo Fletcher saw Dr Marshall Chaudhry for advanced heart failure - not interested in palliative care at this point    Currently having aphasia   Has dyspnea with significant exertion   No chest pain or palpitations  Having issues with sleeping  Review of Systems   Constitutional: Negative  HENT: Negative  Eyes: Negative  Respiratory: Positive for shortness of breath  Negative for chest tightness  Cardiovascular: Positive for leg swelling  Negative for chest pain and palpitations  Gastrointestinal: Negative  Endocrine: Negative  Genitourinary: Negative  Musculoskeletal: Negative  Skin: Negative  Allergic/Immunologic: Negative  Neurological: Positive for speech difficulty  Hematological: Negative  Psychiatric/Behavioral: Negative  All other systems reviewed and are negative          Past Medical History:   Diagnosis Date    Cardiomyopathy [facility])     CHF (congestive heart failure) (HCC)     COPD (chronic obstructive pulmonary disease) (HCC)     Dyspnea on exertion     Essential hypertension     Non-ischemic cardiomyopathy (HCC)      Past Surgical History:   Procedure Laterality Date    IR BIOPSY BONE MARROW  5/26/2021    PILONIDAL CYST EXCISION Social History     Substance and Sexual Activity   Alcohol Use Not Currently    Alcohol/week: 2 0 standard drinks    Types: 2 Cans of beer per week     Social History     Substance and Sexual Activity   Drug Use No     Social History     Tobacco Use   Smoking Status Former Smoker    Packs/day: 1 00    Years: 63 00    Pack years: 63 00    Types: Cigarettes    Start date:     Quit date:     Years since quittin 5   Smokeless Tobacco Never Used     Family History   Problem Relation Age of Onset   [place] Breast cancer Mother 55    Hypertension Father     Cancer Father     Cancer Family     Heart disease Maternal Uncle     Coronary artery disease Maternal Uncle     Heart failure Maternal Uncle     Breast cancer Sister 48    Heart attack Neg Hx     Stroke Neg Hx     Anuerysm Neg Hx     Arrhythmia Neg Hx     Clotting disorder Neg Hx     Hyperlipidemia Neg Hx        Allergies:  No Known Allergies    Medications:     Current Outpatient Medications:     aspirin (ECOTRIN LOW STRENGTH) 81 mg EC tablet, Take 81 mg by mouth daily, Disp: , Rfl:     atorvastatin (LIPITOR) 20 mg tablet, TAKE 1 TABLET BY MOUTH IN  THE EVENING, Disp: 90 tablet, Rfl: 3    Entresto  MG TABS, TAKE 1 TABLET BY MOUTH  TWICE DAILY, Disp: 180 tablet, Rfl: 3    finasteride (PROSCAR) 5 mg tablet, Take 1 tablet (5 mg total) by mouth daily, Disp: 90 tablet, Rfl: 2    furosemide (LASIX) 20 mg tablet, Take 1 tablet (20 mg total) by mouth 2 (two) times a day, Disp: 60 tablet, Rfl: 1    Incruse Ellipta 62 5 MCG/INH AEPB inhaler, USE 1 INHALATION BY MOUTH  DAILY, Disp: 30 blister, Rfl: 3    metoprolol succinate (TOPROL-XL) 25 mg 24 hr tablet, Take 1 tablet (25 mg total) by mouth daily, Disp: 90 tablet, Rfl: 3    predniSONE 20 mg tablet, Take 1 tablet (20 mg total) by mouth daily, Disp: 30 tablet, Rfl: 3    Ventolin  (90 Base) MCG/ACT inhaler, USE 2 INHALATIONS BY MOUTH  EVERY 6 HOURS AS NEEDED FOR WHEEZING, Disp: 72 g, Rfl: 3      Wt Readings from Last 3 Encounters:   07/14/22 68 5 kg (151 lb)   07/11/22 69 4 kg (153 lb)   07/06/22 66 7 kg (147 lb)     Temp Readings from Last 3 Encounters:   07/11/22 97 8 °F (36 6 °C) (Temporal)   07/06/22 97 8 °F (36 6 °C) (Tympanic)   05/24/22 (!) 96 3 °F (35 7 °C) (Tympanic)     BP Readings from Last 3 Encounters:   07/14/22 92/50   07/11/22 100/66   07/06/22 90/58     Pulse Readings from Last 3 Encounters:   07/14/22 100   07/11/22 (!) 116   07/06/22 105         Physical Exam  HENT:      Head: Atraumatic  Mouth/Throat:      Mouth: Mucous membranes are moist    Eyes:      Extraocular Movements: Extraocular movements intact  Cardiovascular:      Rate and Rhythm: Normal rate and regular rhythm  Pulmonary:      Breath sounds: Normal breath sounds  Abdominal:      General: Abdomen is flat  Musculoskeletal:         General: Normal range of motion  Cervical back: Normal range of motion  Skin:     General: Skin is warm  Neurological:      Mental Status: He is alert     Psychiatric:         Mood and Affect: Mood normal            Laboratory Studies:  CMP:  Lab Results   Component Value Date     10/22/2015    K 4 1 05/25/2022     05/25/2022    CO2 29 05/25/2022    ANIONGAP 3 (L) 10/22/2015    BUN 43 (H) 05/25/2022    CREATININE 1 61 (H) 05/25/2022    GLUCOSE 100 10/22/2015    AST 14 05/25/2022    ALT 13 05/25/2022    BILITOT 0 69 10/22/2015    EGFR 41 05/25/2022       Lipid Profile:   Lab Results   Component Value Date    CHOL 190 10/22/2015     Lab Results   Component Value Date    HDL 98 04/20/2021     Lab Results   Component Value Date    LDLCALC 71 04/20/2021     Lab Results   Component Value Date    TRIG 50 04/20/2021       Cardiac testing:   EKG reviewed personally:   Results for orders placed during the hospital encounter of 09/03/21    Echo complete with contrast if indicated    Narrative  [address] [address]  [phone]    Transthoracic Echocardiogram  2D, M-mode, Doppler, and Color Doppler    Study date:  03-Sep-2021    Patient: Jaquan Ha  MR number: [id number]  Account number: [de-identified]  : 1945  Age: 78 years  Gender: Male  Status: Outpatient  Location: [facility]  Height: 68 in  Weight: 160 6 lb  BP: 110/ 58 mmHg    Indications: NICM  Diagnoses: I42 8 - Other cardiomyopathies    Sonographer:  LENIN Good  Primary Physician:  Marlys Winkler DO  Referring Physician:  Amee Selby MD  Group:  [facility]  Interpreting Physician:  Cosme Light MD    SUMMARY    PROCEDURE INFORMATION:  This was a technically difficult study  LEFT VENTRICLE:  The ventricle was moderately dilated  Systolic function was severely reduced  Ejection fraction was estimated in the range of 20 % to 25 %  There was severe diffuse hypokinesis with distinct regional wall motion abnormalities  Doppler parameters were consistent with abnormal left ventricular relaxation (grade 1 diastolic dysfunction)  Doppler parameters were consistent with elevated mean left atrial filling pressure  Although there was no diagnostic evidence for apical thrombus, this study is not adequate to completely exclude the possibility  Endocardium poorly visualised  Consider constast ECHO  RIGHT VENTRICLE:  The ventricle was mildly dilated  LEFT ATRIUM:  The atrium was markedly dilated  RIGHT ATRIUM:  The atrium was mildly dilated  MITRAL VALVE:  There was mild regurgitation  AORTIC VALVE:  There was no evidence for stenosis  TRICUSPID VALVE:  There was mild regurgitation  Estimated peak PA pressure was 37 mmHg  PULMONIC VALVE:  There was mild regurgitation  IVC, HEPATIC VEINS:  The inferior vena cava was mildly dilated  Respirophasic changes were normal     HISTORY: PRIOR HISTORY: NICM, COPD, HTN, PATTEN, former smoker, CVA      PROCEDURE: The study was performed in the [facility]  This was a routine study  The transthoracic approach was used  The study included complete 2D imaging, M-mode, complete spectral Doppler, and color Doppler  The  heart rate was 54 bpm, at the start of the study  Images were obtained from the parasternal, apical, subcostal, and suprasternal notch acoustic windows  This was a technically difficult study  LEFT VENTRICLE: The ventricle was moderately dilated  Systolic function was severely reduced  Ejection fraction was estimated in the range of 20 % to 25 %  There was severe diffuse hypokinesis with distinct regional wall motion  abnormalities  Wall thickness was normal  Although there was no diagnostic evidence for apical thrombus, this study is not adequate to completely exclude the possibility  Endocardium poorly visualised  Consider constast ECHO  DOPPLER:  Doppler parameters were consistent with abnormal left ventricular relaxation (grade 1 diastolic dysfunction)  Doppler parameters were consistent with elevated mean left atrial filling pressure  RIGHT VENTRICLE: The ventricle was mildly dilated  Systolic function was normal  Wall thickness was normal     LEFT ATRIUM: The atrium was markedly dilated  RIGHT ATRIUM: The atrium was mildly dilated  MITRAL VALVE: Valve structure was normal  There was normal leaflet separation  DOPPLER: The transmitral velocity was within the normal range  There was no evidence for stenosis  There was mild regurgitation  AORTIC VALVE: The valve was trileaflet  Leaflets exhibited normal thickness and normal cuspal separation  DOPPLER: Transaortic velocity was within the normal range  There was no evidence for stenosis  There was no significant  regurgitation  TRICUSPID VALVE: The valve structure was normal  There was normal leaflet separation  DOPPLER: The transtricuspid velocity was within the normal range  There was no evidence for stenosis  There was mild regurgitation  Pulmonary artery  systolic pressure was mildly increased  Estimated peak PA pressure was 37 mmHg  PULMONIC VALVE: Leaflets exhibited normal thickness, no calcification, and normal cuspal separation  DOPPLER: The transpulmonic velocity was within the normal range  There was mild regurgitation  PERICARDIUM: There was no pericardial effusion  The pericardium was normal in appearance  AORTA: The root exhibited normal size  SYSTEMIC VEINS: IVC: The inferior vena cava was mildly dilated  Respirophasic changes were normal     SYSTEM MEASUREMENT TABLES    2D  %FS: 4 88 %  Ao Diam: 3 76 cm  EDV(Teich): 240 43 ml  EF Biplane: 31 46 %  EF(Teich): 10 72 %  ESV(Teich): 214 64 ml  IVSd: 0 87 cm  LA Diam: 4 22 cm  LAAs A2C: 29 06 cm2  LAAs A4C: 36 57 cm2  LAESV A-L A2C: 103 95 ml  LAESV A-L A4C: 177 89 ml  LAESV Index (A-L): 76 ml/m2  LAESV MOD A2C: 99 41 ml  LAESV MOD A4C: 169 92 ml  LAESV(A-L): 141 36 ml  LAESV(MOD BP): 135 01 ml  LAESVInd MOD BP: 72 58 ml/m2  LALs A2C: 6 9 cm  LALs A4C: 6 38 cm  LVEDV MOD A2C: 305 74 ml  LVEDV MOD A4C: 290 04 ml  LVEDV MOD BP: 304 7 ml  LVEDVInd MOD BP: 163 82 ml/m2  LVEF MOD A2C: 31 12 %  LVEF MOD A4C: 29 79 %  LVESV MOD A2C: 210 59 ml  LVESV MOD A4C: 203 63 ml  LVESV MOD BP: 208 84 ml  LVESVInd MOD BP: 112 28 ml/m2  LVIDd: 6 81 cm  LVIDs: 6 48 cm  LVLd A2C: 10 24 cm  LVLd A4C: 9 75 cm  LVLs A2C: 9 15 cm  LVLs A4C: 8 96 cm  LVPWd: 0 63 cm  Leda A4C: 18 01 cm2  RAAs A4C: 22 48 cm2  RAEDV A-L: 58 65 ml  RAEDV MOD: 58 98 ml  RAESV A-L: 76 47 ml  RAESV MOD: 74 55 ml  RALd: 4 7 cm  RALs: 5 61 cm  RVIDd: 4 47 cm  SV MOD A2C: 95 15 ml  SV MOD A4C: 86 41 ml  SV(Teich): 25 79 ml    CW  AV Env  Ti: 327 31 ms  AV MaxP 2 mmHg  AV VTI: 29 72 cm  AV Vmax: 1 34 m/s  AV Vmean: 0 91 m/s  AV meanPG: 3 74 mmHg  PRend P 6 mmHg  PRend Vmax: 1 26 m/s  TR MaxP 89 mmHg  TR Vmax: 2 64 m/s    PW  E' Sept: 0 05 m/s  E/E' Sept: 16 63  LVOT Env  Ti: 338 73 ms  LVOT VTI: 14 7 cm  LVOT Vmax: 0 64 m/s  LVOT Vmean: 0 43 m/s  LVOT maxP 63 mmHg  LVOT meanP 87 mmHg  MV A Anupam: 0 99 m/s  MV Dec Baldwin: 3 58 m/s2  MV DecT: 232 93 ms  MV E Anupam: 0 83 m/s  MV E/A Ratio: 0 84  MV PHT: 67 55 ms  MVA By PHT: 3 26 cm2  PV Acc Baldwin: 7 62 m/s2  PV AccT: 87 54 ms    [facility] Accredited Echocardiography Laboratory    Prepared and electronically signed by    Christoph Dale MD  Signed 03-Sep-2021 14:37:46    No results found for this or any previous visit  No results found for this or any previous visit  No results found for this or any previous visit

## 2022-07-16 ENCOUNTER — APPOINTMENT (OUTPATIENT)
Dept: LAB | Facility: CLINIC | Age: 77
End: 2022-07-16
Payer: MEDICARE

## 2022-07-16 DIAGNOSIS — D61.9 APLASTIC ANEMIA (HCC): ICD-10-CM

## 2022-07-16 LAB
BASOPHILS # BLD AUTO: 0 THOUSANDS/ΜL (ref 0–0.1)
BASOPHILS NFR BLD AUTO: 0 % (ref 0–1)
EOSINOPHIL # BLD AUTO: 0 THOUSAND/ΜL (ref 0–0.61)
EOSINOPHIL NFR BLD AUTO: 0 % (ref 0–6)
ERYTHROCYTE [DISTWIDTH] IN BLOOD BY AUTOMATED COUNT: 15.4 % (ref 11.6–15.1)
HCT VFR BLD AUTO: 25.8 % (ref 36.5–49.3)
HGB BLD-MCNC: 7.9 G/DL (ref 12–17)
IMM GRANULOCYTES # BLD AUTO: 0.02 THOUSAND/UL (ref 0–0.2)
IMM GRANULOCYTES NFR BLD AUTO: 1 % (ref 0–2)
LYMPHOCYTES # BLD AUTO: 0.99 THOUSANDS/ΜL (ref 0.6–4.47)
LYMPHOCYTES NFR BLD AUTO: 22 % (ref 14–44)
MCH RBC QN AUTO: 35.3 PG (ref 26.8–34.3)
MCHC RBC AUTO-ENTMCNC: 30.6 G/DL (ref 31.4–37.4)
MCV RBC AUTO: 115 FL (ref 82–98)
MONOCYTES # BLD AUTO: 0.56 THOUSAND/ΜL (ref 0.17–1.22)
MONOCYTES NFR BLD AUTO: 13 % (ref 4–12)
NEUTROPHILS # BLD AUTO: 2.87 THOUSANDS/ΜL (ref 1.85–7.62)
NEUTS SEG NFR BLD AUTO: 64 % (ref 43–75)
NRBC BLD AUTO-RTO: 0 /100 WBCS
PLATELET # BLD AUTO: 52 THOUSANDS/UL (ref 149–390)
PMV BLD AUTO: 12.4 FL (ref 8.9–12.7)
RBC # BLD AUTO: 2.24 MILLION/UL (ref 3.88–5.62)
WBC # BLD AUTO: 4.44 THOUSAND/UL (ref 4.31–10.16)

## 2022-07-16 PROCEDURE — 36415 COLL VENOUS BLD VENIPUNCTURE: CPT

## 2022-07-16 PROCEDURE — 85025 COMPLETE CBC W/AUTO DIFF WBC: CPT

## 2022-07-21 LAB — SCAN RESULT: NORMAL

## 2022-08-15 ENCOUNTER — TELEPHONE (OUTPATIENT)
Dept: NEPHROLOGY | Facility: CLINIC | Age: 77
End: 2022-08-15

## 2022-09-03 DIAGNOSIS — R33.9 INCOMPLETE EMPTYING OF BLADDER: ICD-10-CM

## 2022-09-03 NOTE — TELEPHONE ENCOUNTER
An Auto-fax Refill Request for Finasteride 5mg was received from [facility] mail order pharmacy  The patient was last seen on 6/1/22 by Dawit Kebede PA-C in the [facility] location; continuation of the medication was authorized at that time    Request for same, 90 day supply with 3 refills was queued and forwarded to the Advanced Practitioner covering the [facility] location for approval

## 2022-09-06 RX ORDER — FINASTERIDE 5 MG/1
5 TABLET, FILM COATED ORAL DAILY
Qty: 90 TABLET | Refills: 3 | Status: SHIPPED | OUTPATIENT
Start: 2022-09-06

## 2022-09-12 ENCOUNTER — TELEPHONE (OUTPATIENT)
Dept: HEMATOLOGY ONCOLOGY | Facility: CLINIC | Age: 77
End: 2022-09-12

## 2022-09-12 ENCOUNTER — OFFICE VISIT (OUTPATIENT)
Dept: HEMATOLOGY ONCOLOGY | Facility: CLINIC | Age: 77
End: 2022-09-12
Payer: MEDICARE

## 2022-09-12 VITALS
HEART RATE: 86 BPM | BODY MASS INDEX: 23.34 KG/M2 | OXYGEN SATURATION: 98 % | WEIGHT: 154 LBS | SYSTOLIC BLOOD PRESSURE: 98 MMHG | RESPIRATION RATE: 16 BRPM | TEMPERATURE: 97.8 F | HEIGHT: 68 IN | DIASTOLIC BLOOD PRESSURE: 58 MMHG

## 2022-09-12 DIAGNOSIS — N18.30 ANEMIA IN STAGE 3 CHRONIC KIDNEY DISEASE, UNSPECIFIED WHETHER STAGE 3A OR 3B CKD (HCC): ICD-10-CM

## 2022-09-12 DIAGNOSIS — D61.9 APLASTIC ANEMIA (HCC): Primary | ICD-10-CM

## 2022-09-12 DIAGNOSIS — D63.1 ANEMIA IN STAGE 3 CHRONIC KIDNEY DISEASE, UNSPECIFIED WHETHER STAGE 3A OR 3B CKD (HCC): ICD-10-CM

## 2022-09-12 DIAGNOSIS — D61.818 PANCYTOPENIA (HCC): ICD-10-CM

## 2022-09-12 PROBLEM — N18.9 ANEMIA IN CKD (CHRONIC KIDNEY DISEASE): Status: ACTIVE | Noted: 2022-01-01

## 2022-09-12 PROCEDURE — 99215 OFFICE O/P EST HI 40 MIN: CPT | Performed by: PHYSICIAN ASSISTANT

## 2022-09-12 NOTE — TELEPHONE ENCOUNTER
Follow-up disposition: Return for 6-8 weeks , Dr Skip Miles  While we try to accommodate patient requests, our priority is to schedule treatment according to Doctor's orders and site availability  1  Does the Provider use the intake sheet or checkout note? no  2  What would be a preferred day of the week that would work best for your infusion appointment? Monday  3  Do you prefer mornings or afternoons for your appointments? afternoon  4  Are there any days or dates that do not work for your schedule, including any upcoming vacations? none  5  We are going to try our best to schedule you at the infusion center closest to your home  In the event that we are unable to what would be your next preferred infusion site or sites? 1  [facility]  2  [facility]  3  [facility]    6  Do you have transportation to take you to all of your appointments? yes  7   Would you like the infusion center to draw labs from your port? (disregard if patient doesn't have a port or need labs for infusion appointment) n/a

## 2022-09-12 NOTE — PROGRESS NOTES
Hematology/Oncology Outpatient Follow- up Note  Alta Higuera 78 y o  male MRN: @ Encounter: [id number]        Date:  9/12/2022      Assessment / Plan:       1  Nonsevere Aplastic anemia (ANC>500; normal retic count; platelet HCBRD>04,463) diagnosed 5/2021  He presented with progressive pancytopenia  Not prone to frequent infections  No easy bruising or bleeding  Initial workup 5/2021 revealed nonreactive HIV, normal haptoglobin, normal reticulocyte count 1 09, no monoclonal band on SPEP, TSH, iron panel     BMBX 5/26/21 -Hypocellular marrow (5% cellularity) with trilineage hematopoiesis  - 55 XY, 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 (p [id number]) was detected in this patient's sample  No evidence for leukemia or lymphoma  No HSM identified on 5/21 abd u/s  He has been observed  Comorbid health conditions of Non-ischemic Cardiomyopathy and advanced heart failure  EF was 10%; improved to 20-25% 9/2021, advanced COPD with PATTEN for many years  S/P CVA with expressive aphasia  2  Hemoglobin has been decreasing slowly  13 g/dL 10/2017  11 grams/deciliter range 2018 to 2019   9 gram/deciliter range early half of 2021 September 2021- January 2022 hemoglobin has been 8 5-9 1  ANC remains > 1 3 since April 2018  Platelet count decreased from [phone] to 2019  90 range 2021  65-78 November 2021 through 1/19/2022  Platelet count romeo 52 000 7/2022     3  Worsening renal function  Creatinine less than 1 2 May 2019 and priors  Baseline then increase to 1 5-1 6  BUN 48 with creatinine 2 1 1/19/2022  He was taken off lasix 3/22 per cardiology  5/2022 cr back to 1 6     4  History of alcohol use and low B12  He quit drinking 6/2021  B12 improved from 260 5/21 - 736 11/2021 5/2022 Normal iron panel, retic 2%    Hemoglobin decreased further to 8 3g/dL    Prednisone 20 mg p o  daily for 2 months rx 7/11/22 7/16/22 hemoglobin 7 9, , WBC 4 4, differential stable, platelet count 18,646  NEGATIVE [facility] screen      Not a candidate for transplant, Likely not a candidate for triple IST given his comorbidities of CAD, kidney disease  He does not meet criteria for severe disease at this time  He has been more symptomatic with PATTEN, anemia suspected component  He does have chronic kidney disease  We discussed the possibility of Aranesp  Discussed the goals to raise his hemoglobin around 8 5-9 grams/deciliter to help with symptoms  Will start Aranesp at 100mcg q 2 weeks  Potential side effects could include but may not be limited to:  cardiovascular event such as MI or CVA, blood clot, dizziness, headache, nausea, vomiting, bone pain, high blood pressure, seizure, allergic reaction, tenderness at injection site  He wishes to proceed  If he fails to respond to Aranesp, consider repeat BMBX  Promacta 150mg po daily could be trialed for Aplastic Anemia in the future  HPI: Anastacio Machado is a 79 y/o man seen initially by hematology 5/2021 regarding pancytopenia  04/20/2021: WBC 4 07, Hgb 9 4g/dL, , platelet 94, ANC 9 38, normal differential  Patient has had these blood work findings since approximately 4/2018  Significant cardiopulmonary history  He has severe COPD with acute exacerbation  Follows with Dr Annette Fernandez He has history of Pulmonary Nodule also followed by Dr Annette Fernandez  1 3 cm with SUV 1 4 on 7/2019 CT Decreased size on repeat CT Chest July 2019 and resolved on May 2021  Annual LDCT chest annually due May 2022     History of Non-ischemic Cardiomyopathy and advanced heart failure  EF was 10%; improved to 20-25% 9/2021  Follows with Dr Ivan Daily  Not interested in ICD/BiV - even if would improve symptoms  Does not want to have any major intervention     Chronic PATTEN > 20   Smoked 1 -1  5PPD x 50 years quit 2018  History of expressive aphasia since 2018   MRI from April of 2019 there was mention made of an area of scarring/encephalomalacia in the left frontoparietal junction which is consistent with an old/chronic stroke  Since 2019, creatinine and BUN elevated  He drank 2 cans of beer a day  Retired, worked at [employer], also worked at [employer]  He does have a family history of breast cancer in mother and sister  Lung cancer history in father and brother  He has never had a colonoscopy before  Not prone to frequent infections  No easy bruising or bleeding  Initial workup 5/2021 revealed nonreactive HIV, normal haptoglobin, normal reticulocyte count 1 09, no monoclonal band on SPEP, TSH, iron panel     BMBX 5/26/21: 55 XY, 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 (p [id number]) was detected in this patient's sample  Hypocellular marrow (5% cellularity) with trilineage hematopoiesis  No evidence for leukemia or lymphoma  Increased iron stores  Patient was observed due to stability of counts, ANC> 500 and no frequent infection or bleeding  Patient did discontinue alcohol use 2021  B12 level did improve  Denies any easy bruising or bleeding  No history of clot  5/25/22- creatinine back down to 1 61; iron saturation 22%; ferritin 190; PSA 0 6, Hemoglobin 8 3, ; WBC 4 05, 46% neutrophils, 33% lymphocyees, 14% monocytes  platelet count 57,903  Retic 2%      Interval History:      Prednisone 20 mg p o  daily for 2 months rx 7/11/22  PNH panel negative       7/16/22 hemoglobin 7 9, , WBC 4 4, differential stable, platelet count 45,224        Test Results:        Labs:   Lab Results   Component Value Date    HGB 7 9 (L) 07/16/2022    HCT 25 8 (L) 07/16/2022     (H) 07/16/2022    PLT 52 (L) 07/16/2022    WBC 4 44 07/16/2022    NRBC 0 07/16/2022     Lab Results   Component Value Date     10/22/2015    K 4 1 05/25/2022     05/25/2022    CO2 29 05/25/2022    ANIONGAP 3 (L) 10/22/2015    BUN 43 (H) 05/25/2022    CREATININE 1 61 (H) 05/25/2022    GLUCOSE 100 10/22/2015    GLUF 104 (H) 05/25/2022    CALCIUM 8 9 05/25/2022 CORRECTEDCA 9 6 04/20/2021    AST 14 05/25/2022    ALT 13 05/25/2022    ALKPHOS 64 05/25/2022    PROT 7 7 10/22/2015    BILITOT 0 69 10/22/2015    EGFR 41 05/25/2022       Imaging: No results found  ROS:  As mentioned in HPI & Interval History otherwise 14 point ROS negative  Allergies: No Known Allergies  Current Medications: Reviewed  PMH/FH/SH:  Reviewed      Physical Exam:    There is no height or weight on file to calculate BSA  Ht Readings from Last 3 Encounters:   07/14/22 5' 8" (1 727 m)   07/11/22 5' 8" (1 727 m)   07/06/22 5' 8" (1 727 m)        Wt Readings from Last 3 Encounters:   07/14/22 68 5 kg (151 lb)   07/11/22 69 4 kg (153 lb)   07/06/22 66 7 kg (147 lb)        Temp Readings from Last 3 Encounters:   07/11/22 97 8 °F (36 6 °C) (Temporal)   07/06/22 97 8 °F (36 6 °C) (Tympanic)   05/24/22 (!) 96 3 °F (35 7 °C) (Tympanic)        BP Readings from Last 3 Encounters:   07/14/22 92/50   07/11/22 100/66   07/06/22 90/58           Physical Exam  Vitals reviewed  Constitutional:       General: He is not in acute distress  Appearance: He is well-developed  He is not diaphoretic  HENT:      Head: Normocephalic and atraumatic  Eyes:      Conjunctiva/sclera: Conjunctivae normal    Neck:      Trachea: No tracheal deviation  Cardiovascular:      Rate and Rhythm: Normal rate and regular rhythm  Heart sounds: No murmur heard  No friction rub  No gallop  Pulmonary:      Effort: Pulmonary effort is normal  No respiratory distress  Breath sounds: Normal breath sounds  No wheezing or rales  Chest:      Chest wall: No tenderness  Abdominal:      General: There is no distension  Palpations: Abdomen is soft  Tenderness: There is no abdominal tenderness  Musculoskeletal:      Cervical back: Normal range of motion and neck supple  Lymphadenopathy:      Cervical: No cervical adenopathy  Skin:     General: Skin is warm and dry  Coloration: Skin is not pale  Findings: No erythema  Neurological:      Mental Status: He is alert and oriented to person, place, and time  Comments: Some aphasia   Psychiatric:         Behavior: Behavior normal          Thought Content:  Thought content normal          Judgment: Judgment normal          ECO      Emergency Contacts:    Extended Emergency Contact Information  Primary Emergency Contact: Fanny Chand  Address: [address] [place] [address] Phone: [phone]  Mobile Phone: [phone]  Relation: Spouse

## 2022-09-16 ENCOUNTER — APPOINTMENT (OUTPATIENT)
Dept: LAB | Facility: CLINIC | Age: 77
End: 2022-09-16
Payer: MEDICARE

## 2022-09-16 ENCOUNTER — CONSULT (OUTPATIENT)
Dept: NEPHROLOGY | Facility: CLINIC | Age: 77
End: 2022-09-16
Payer: MEDICARE

## 2022-09-16 VITALS
SYSTOLIC BLOOD PRESSURE: 80 MMHG | WEIGHT: 152 LBS | BODY MASS INDEX: 23.04 KG/M2 | HEIGHT: 68 IN | DIASTOLIC BLOOD PRESSURE: 54 MMHG

## 2022-09-16 DIAGNOSIS — N18.9 CHRONIC KIDNEY DISEASE, UNSPECIFIED CKD STAGE: ICD-10-CM

## 2022-09-16 DIAGNOSIS — R93.89 ABNORMAL CT OF THE CHEST: ICD-10-CM

## 2022-09-16 DIAGNOSIS — D63.1 ANEMIA IN STAGE 3 CHRONIC KIDNEY DISEASE, UNSPECIFIED WHETHER STAGE 3A OR 3B CKD (HCC): ICD-10-CM

## 2022-09-16 DIAGNOSIS — N18.30 ANEMIA IN STAGE 3 CHRONIC KIDNEY DISEASE, UNSPECIFIED WHETHER STAGE 3A OR 3B CKD (HCC): ICD-10-CM

## 2022-09-16 DIAGNOSIS — N18.32 CKD STAGE G3B/A1, GFR 30-44 AND ALBUMIN CREATININE RATIO <30 MG/G (HCC): ICD-10-CM

## 2022-09-16 DIAGNOSIS — N18.32 CKD STAGE G3B/A1, GFR 30-44 AND ALBUMIN CREATININE RATIO <30 MG/G (HCC): Primary | ICD-10-CM

## 2022-09-16 LAB
BASOPHILS # BLD AUTO: 0.02 THOUSANDS/ΜL (ref 0–0.1)
BASOPHILS NFR BLD AUTO: 0 % (ref 0–1)
EOSINOPHIL # BLD AUTO: 0.04 THOUSAND/ΜL (ref 0–0.61)
EOSINOPHIL NFR BLD AUTO: 1 % (ref 0–6)
ERYTHROCYTE [DISTWIDTH] IN BLOOD BY AUTOMATED COUNT: 14.4 % (ref 11.6–15.1)
HCT VFR BLD AUTO: 27.6 % (ref 36.5–49.3)
HGB BLD-MCNC: 8.9 G/DL (ref 12–17)
IMM GRANULOCYTES # BLD AUTO: 0.03 THOUSAND/UL (ref 0–0.2)
IMM GRANULOCYTES NFR BLD AUTO: 1 % (ref 0–2)
LYMPHOCYTES # BLD AUTO: 1.13 THOUSANDS/ΜL (ref 0.6–4.47)
LYMPHOCYTES NFR BLD AUTO: 23 % (ref 14–44)
MCH RBC QN AUTO: 36.5 PG (ref 26.8–34.3)
MCHC RBC AUTO-ENTMCNC: 32.2 G/DL (ref 31.4–37.4)
MCV RBC AUTO: 113 FL (ref 82–98)
MONOCYTES # BLD AUTO: 0.46 THOUSAND/ΜL (ref 0.17–1.22)
MONOCYTES NFR BLD AUTO: 10 % (ref 4–12)
NEUTROPHILS # BLD AUTO: 3.16 THOUSANDS/ΜL (ref 1.85–7.62)
NEUTS SEG NFR BLD AUTO: 65 % (ref 43–75)
NRBC BLD AUTO-RTO: 0 /100 WBCS
PLATELET # BLD AUTO: 72 THOUSANDS/UL (ref 149–390)
PMV BLD AUTO: 11.3 FL (ref 8.9–12.7)
RBC # BLD AUTO: 2.44 MILLION/UL (ref 3.88–5.62)
WBC # BLD AUTO: 4.84 THOUSAND/UL (ref 4.31–10.16)

## 2022-09-16 PROCEDURE — 99203 OFFICE O/P NEW LOW 30 MIN: CPT | Performed by: STUDENT IN AN ORGANIZED HEALTH CARE EDUCATION/TRAINING PROGRAM

## 2022-09-16 PROCEDURE — 85025 COMPLETE CBC W/AUTO DIFF WBC: CPT

## 2022-09-16 PROCEDURE — 36415 COLL VENOUS BLD VENIPUNCTURE: CPT

## 2022-09-16 NOTE — PROGRESS NOTES
NEPHROLOGY OUTPATIENT CONSULTATION   Tone Sosa 78 y o  male MRN: [id number]  Date: 9/16/2022  Reason for consultation:   Chief Complaint   Patient presents with    Consult     CKD       ASSESSMENT AND PLAN:   78 y o  male with PMH aplastic anemia, Non-ischemic cardiomyopathy - patient with NYHA class III systolic heart failure  EF 25%, COPD, HTN, CKD G3a (baseline creatinine 1 5-1 6mg/dL) who presents for initial consultation for elevated creatinine    PLAN:    #CKD G3b   Baseline creatinine: 1 5-1 6mg/dL)   Current creatinine:at baseline   Etiology:likley secondary to nephroangiosclerosis in the settings of cardiovascular disease   UA: no hematuria, no proteinuria   Plan:   Avoid nephrotoxic agents such as NSAIDs    Repeat BMP in 4 months    #Volume/hypertension:   Volume:slightly hypervolemic    Blood pressure:hypotenisve home BP 60-8/45-60mmhg    Low sodium diet    On entresto , was advised to lower down the dose but patient is waiting for the next refill because of very high copay    I sent a message to Dr Romelia Gant for dose recommendation    Metoprolol    Lasix 20mg bid    Develop LE edema when he is off Lasix     #Acid base disorder   serum HCO3 86YIMO/S   Metabolic alkalosis likely secondary to vascular contraction in the settings of diuretics     #CKD-MBD   Calcium 9 6mg/dL   At goal    Will check iPTH    #Anemia:  · Current hemoglobin:7 9  · Secondary to aplastic anemia   · F/u hem/onc     HISTORY OF PRESENT ILLNESS:  Tone Sosa is a 78 y o  male with PMH aplastic anemia, Non-ischemic cardiomyopathy - patient with NYHA class III systolic heart failure  EF 25%, COPD, HTN, CKD G3a (baseline creatinine 1 5-1 6mg/dL) who presents for initial consultation for elevated creatinine    REVIEW OF SYSTEMS:    More than 10 point review of systems were obtained and discussed in length with the patient   Complete review of systems were negative / unremarkable except mentioned in the HPI section  Review of Systems - Ophthalmic ROS: negative  ENT ROS: negative  Hematological and Lymphatic ROS: positive for - fatigue  Endocrine ROS: negative  Respiratory ROS: no cough, shortness of breath, or wheezing  Cardiovascular ROS: no chest pain or dyspnea on exertion  Gastrointestinal ROS: no abdominal pain, change in bowel habits, or black or bloody stools  Genito-Urinary ROS: no dysuria, trouble voiding, or hematuria  Musculoskeletal ROS: negative  Neurological ROS: no TIA or stroke symptoms  Dermatological ROS: positive for rash     PHYSICAL EXAM:  Vitals:    09/16/22 1053 09/16/22 1114   BP:  (!) 80/54   Weight: 68 9 kg (152 lb)    Height: 5' 8" (1 727 m)      Body mass index is 23 11 kg/m²      Physical Exam   General:   no acute distress at this time  Skin:  No acute rash  Eyes:  No scleral icterus and noninjected  ENT:  mucous membranes moist  Neck:  no carotid bruits  Chest:  Clear to auscultation percussion, good respiratory effort, no use of accessory respiratory muscles  CVS:  Regular rate and rhythm without a murmur rub   Abdomen:  soft and nontender   Extremities:  Trace LE edema   Neuro:  No gross focality  Psych:  Alert , cooperative       PAST MEDICAL HISTORY:  Past Medical History:   Diagnosis Date    [facility]     CHF (congestive heart failure) (HCC)     COPD (chronic obstructive pulmonary disease) (HCC)     Dyspnea on exertion     Essential hypertension     Non-ischemic cardiomyopathy (HCC)        PAST SURGICAL HISTORY:  Past Surgical History:   Procedure Laterality Date    IR BIOPSY BONE MARROW  5/26/2021    PILONIDAL CYST EXCISION         ALLERGIES:  No Known Allergies    SOCIAL HISTORY:  Social History     Substance and Sexual Activity   Alcohol Use Not Currently    Alcohol/week: 2 0 standard drinks    Types: 2 Cans of beer per week     Social History     Substance and Sexual Activity   Drug Use No     Social History     Tobacco Use   Smoking Status Former Smoker  Packs/day: 1 00    Years: 63 00    Pack years: 63 00    Types: Cigarettes    Start date: 5    Quit date:     Years since quittin 7   Smokeless Tobacco Never Used       FAMILY HISTORY:  Family History   Problem Relation Age of Onset    Breast cancer Mother 55    Hypertension Father     Cancer Father     Cancer Family     Heart disease Maternal Uncle     Coronary artery disease Maternal Uncle     Heart failure Maternal Uncle     Breast cancer Sister 48    Heart attack Neg Hx     Stroke Neg Hx     Anuerysm Neg Hx     Arrhythmia Neg Hx     Clotting disorder Neg Hx     Hyperlipidemia Neg Hx        MEDICATIONS:    Current Outpatient Medications:     aspirin (ECOTRIN LOW STRENGTH) 81 mg EC tablet, Take 81 mg by mouth daily, Disp: , Rfl:     finasteride (PROSCAR) 5 mg tablet, Take 1 tablet (5 mg total) by mouth daily, Disp: 90 tablet, Rfl: 3    furosemide (LASIX) 20 mg tablet, Take 1 tablet (20 mg total) by mouth 2 (two) times a day, Disp: 180 tablet, Rfl: 3    Incruse Ellipta 62 5 MCG/INH AEPB inhaler, USE 1 INHALATION BY MOUTH  DAILY, Disp: 30 blister, Rfl: 3    metoprolol succinate (TOPROL-XL) 25 mg 24 hr tablet, Take 1 tablet (25 mg total) by mouth daily, Disp: 90 tablet, Rfl: 3    predniSONE 20 mg tablet, Take 1 tablet (20 mg total) by mouth daily, Disp: 30 tablet, Rfl: 3    sacubitril-valsartan (Entresto) 49-51 MG TABS, Take 1 tablet by mouth 2 (two) times a day (Patient taking differently: Take 1 tablet by mouth 2 (two) times a day Take one 97-103mg tablet by mouth two times daily ), Disp: 180 tablet, Rfl: 3    Ventolin  (90 Base) MCG/ACT inhaler, USE 2 INHALATIONS BY MOUTH  EVERY 6 HOURS AS NEEDED FOR WHEEZING, Disp: 72 g, Rfl: 3    Lab Results:   Results for orders placed or performed in visit on 22   CBC and differential   Result Value Ref Range    WBC 4 44 4 31 - 10 16 Thousand/uL    RBC 2 24 (L) 3 88 - 5 62 Million/uL    Hemoglobin 7 9 (L) 12 0 - 17 0 g/dL Hematocrit 25 8 (L) 36 5 - 49 3 %     (H) 82 - 98 fL    MCH 35 3 (H) 26 8 - 34 3 pg    MCHC 30 6 (L) 31 4 - 37 4 g/dL    RDW 15 4 (H) 11 6 - 15 1 %    MPV 12 4 8 9 - 12 7 fL    Platelets 52 (L) 595 - 390 Thousands/uL    nRBC 0 /100 WBCs    Neutrophils Relative 64 43 - 75 %    Immat GRANS % 1 0 - 2 %    Lymphocytes Relative 22 14 - 44 %    Monocytes Relative 13 (H) 4 - 12 %    Eosinophils Relative 0 0 - 6 %    Basophils Relative 0 0 - 1 %    Neutrophils Absolute 2 87 1 85 - 7 62 Thousands/µL    Immature Grans Absolute 0 02 0 00 - 0 20 Thousand/uL    Lymphocytes Absolute 0 99 0 60 - 4 47 Thousands/µL    Monocytes Absolute 0 56 0 17 - 1 22 Thousand/µL    Eosinophils Absolute 0 00 0 00 - 0 61 Thousand/µL    Basophils Absolute 0 00 0 00 - 0 10 Thousands/µL   PNH profile, WBC   Result Value Ref Range    Scan Result SEE WRITTEN REPORT        Portions of the record may have been created with voice recognition software  Occasional wrong word or "sound a like" substitutions may have occurred due to the inherent limitations of voice recognition software  Read the chart carefully and recognize, using context, where substitutions have occurred

## 2022-09-16 NOTE — PATIENT INSTRUCTIONS
Thank you for coming to your visit today  As we discussed you kidney function is abnormal but stable, your creatinine is 1 6mg/dL  Your electrolytes are normal  Please follow the recommendations below       Recommend low sodium (salt) food    Avoid nonsteroidal anti-inflammatory drugs such as Naprosyn, ibuprofen, Aleve, Advil, Celebrex, Meloxicam (Mobic) etc   You can use Tylenol as needed if you do not have any liver condition to be concerned about    Try to avoid medications such as pantoprazole or  Protonix/Nexium or Esomeprazole)/Prilosec or omeprazole/Prevacid or lansoprazole/AcipHex or Rabeprazole  If you are able to, use Pepcid as this is safer for your kidneys      Next Visit in 4 months with results   If you need to see us earlier we can change the appointment for you      Keyur Krishnamurthy MD  Nephrology Attending

## 2022-09-17 ENCOUNTER — TELEPHONE (OUTPATIENT)
Dept: INFUSION CENTER | Facility: CLINIC | Age: 77
End: 2022-09-17

## 2022-09-17 NOTE — TELEPHONE ENCOUNTER
I reached out to go over new patient information as well as COVID screening  Patient denies any symptoms of COVID or contact with anyone who has COVID  Confirmed appointment date, time, location and current policies on  visitors and masking  Pt was also advised on our attendance policy that requires a minimum of 24hrs notice prior to scheduled appnts for any cancellations  Pt expressed understanding

## 2022-09-19 NOTE — PROGRESS NOTES
Patient to infusion for Aranesp  Wife states he has a rash and oncology Dr aware  BP within parameters for treatment  Aranesp given in Left Arm without incident  Band aid applied to injection site    Next appointment confirmed, she declined AVS

## 2022-09-20 NOTE — TELEPHONE ENCOUNTER
Patient's wife calling stating since taking the anoro avery has a rash on his chest, back and arms  She isn't sure if the anoro is causing it but she also stated it isn't helping him as much as the incruse did   Please advise [phone]

## 2022-09-21 NOTE — TELEPHONE ENCOUNTER
Spoke with patient's wife, they are agreeable to the plan  They will need a new script [facility] for the incruse

## 2022-09-22 PROBLEM — R06.00 DYSPNEA: Status: RESOLVED | Noted: 2022-01-01 | Resolved: 2022-01-01

## 2022-09-22 PROBLEM — R06.00 DYSPNEA: Status: ACTIVE | Noted: 2022-01-01

## 2022-09-22 PROBLEM — J96.01 ACUTE RESPIRATORY FAILURE WITH HYPOXIA (HCC): Status: ACTIVE | Noted: 2022-01-01

## 2022-09-22 PROBLEM — I50.9 CHF, ACUTE ON CHRONIC (HCC): Status: ACTIVE | Noted: 2022-01-01

## 2022-09-22 PROBLEM — N18.9 CKD (CHRONIC KIDNEY DISEASE): Status: ACTIVE | Noted: 2022-01-01

## 2022-09-22 PROBLEM — I48.91 A-FIB (HCC): Status: ACTIVE | Noted: 2022-01-01

## 2022-09-22 NOTE — TELEPHONE ENCOUNTER
Per MICHELLE Duran, and Killian Beckford RN and Stephania Cristobal RN, Dr Jes Victor, I contacted [facility] ED infusion and asked that the 10/3 and 10/7 Aranesp infusion appointments be cancelled

## 2022-09-22 NOTE — ASSESSMENT & PLAN NOTE
The patient presented with atrial fibrillation and has been rate controlled with Lopressor     -holding home beta-blocker while on IV  Lopressor   -cardiology consulted  -on telemetry   -anticoagulation with heparin drip

## 2022-09-22 NOTE — ED PROVIDER NOTES
History  Chief Complaint   Patient presents with    Shortness of Breath     Pt complains of SOB started a few days ago     Patient presents to the emergency room with a 2 week history of shortness of breath  He complains of the shortness of breath has been getting progressively worse  He has a history of COPD and CHF  He has been using his inhalers at home with no relief  Recently had a change of his inhalers  He was on [employer] was change to [employer]  He states that his inhalers are not helping him  He has history of appy as stroke and has expressive aphasia  Patient complains of nasal congestion and postnasal drainage  His eyes and watering as well  The question whether or not this is related to allergies  Patient denies any history of coughing but does complain of wheezing  He denies any chest or abdominal pain  He denies any black tarry stools  He denies any urinary frequency, urgency, hematuria, dysuria  He went for an infusion 3 days ago  Patient's wife states that he stop taking his Lasix a few months ago because he was dehydrated  He never restarted the medication  His hematology records were reviewed  They were going to try Aranesp to increase his hematocrit and hemoglobin greater than 9/28   Past medical history is positive for aplastic anemia, cardiomegaly, CHF, hypertension, COPD, nonischemic cardiomyopathy, exertional dyspnea        History provided by:  Patient  Shortness of Breath  Severity:  Moderate  Onset quality:  Gradual  Duration:  2 weeks  Timing:  Constant  Progression:  Waxing and waning  Chronicity:  Recurrent  Context: activity    Context: not animal exposure, not emotional upset, not known allergens, not occupational exposure, not pollens, not strong odors, not URI and not weather changes    Associated symptoms: wheezing    Associated symptoms: no abdominal pain, no chest pain, no cough, no fever, no rash, no sore throat and no vomiting        Prior to Admission Medications   Prescriptions Last Dose Informant Patient Reported? Taking?    Ventolin  (90 Base) MCG/ACT inhaler 9/21/2022 at Unknown time Spouse/Significant Other No Yes   Sig: USE 2 INHALATIONS BY MOUTH  EVERY 6 HOURS AS NEEDED FOR WHEEZING   aspirin (ECOTRIN LOW STRENGTH) 81 mg EC tablet 9/21/2022 at Unknown time Spouse/Significant Other Yes Yes   Sig: Take 81 mg by mouth daily   finasteride (PROSCAR) 5 mg tablet Past Week at Unknown time  No Yes   Sig: Take 1 tablet (5 mg total) by mouth daily   furosemide (LASIX) 20 mg tablet 9/21/2022 at Unknown time  No Yes   Sig: Take 1 tablet (20 mg total) by mouth 2 (two) times a day   metoprolol succinate (TOPROL-XL) 25 mg 24 hr tablet 9/21/2022 at Unknown time Spouse/Significant Other No Yes   Sig: Take 1 tablet (25 mg total) by mouth daily   predniSONE 20 mg tablet  Spouse/Significant Other No No   Sig: Take 1 tablet (20 mg total) by mouth daily   Patient not taking: Reported on 9/19/2022   sacubitril-valsartan (Entresto) 49-51 MG TABS   No No   Sig: Take 1 tablet by mouth 2 (two) times a day   Patient not taking: Reported on 9/19/2022   umeclidinium bromide (Incruse Ellipta) 62 5 mcg/inh AEPB inhaler 9/21/2022 at Unknown time  No Yes   Sig: Inhale 1 puff daily   umeclidinium bromide (Incruse Ellipta) 62 5 mcg/inh AEPB inhaler 9/21/2022 at Unknown time  No Yes   Sig: Inhale 1 puff daily      Facility-Administered Medications: None       Past Medical History:   Diagnosis Date    Cardiomyopathy [facility] )     CHF (congestive heart failure) (HCC)     COPD (chronic obstructive pulmonary disease) (HCC)     Dyspnea on exertion     Essential hypertension     Non-ischemic cardiomyopathy ([facility] )        Past Surgical History:   Procedure Laterality Date    IR BIOPSY BONE MARROW  5/26/2021    PILONIDAL CYST EXCISION         Family History   Problem Relation Age of Onset    Breast cancer Mother 55    Hypertension Father     Cancer Father     Cancer Family     Heart disease Maternal Uncle     Coronary artery disease Maternal Uncle     Heart failure Maternal Uncle     Breast cancer Sister 48    Heart attack Neg Hx     Stroke Neg Hx     Anuerysm Neg Hx     Arrhythmia Neg Hx     Clotting disorder Neg Hx     Hyperlipidemia Neg Hx      I have reviewed and agree with the history as documented  E-Cigarette/Vaping    E-Cigarette Use Never User      E-Cigarette/Vaping Substances    Nicotine No     THC No     CBD No     Flavoring No     Other No     Unknown No      Social History     Tobacco Use    Smoking status: Former Smoker     Packs/day: 1 00     Years: 63 00     Pack years: 63 00     Types: Cigarettes     Start date:      Quit date:      Years since quittin 7    Smokeless tobacco: Never Used   Vaping Use    Vaping Use: Never used   Substance Use Topics    Alcohol use: Not Currently     Alcohol/week: 2 0 standard drinks     Types: 2 Cans of beer per week    Drug use: No       Review of Systems   Constitutional: Positive for activity change and fatigue  Negative for appetite change, chills and fever  HENT: Positive for congestion, postnasal drip and rhinorrhea  Negative for mouth sores and sore throat  Eyes: Negative for discharge and redness  Respiratory: Positive for shortness of breath and wheezing  Negative for cough and chest tightness  Cardiovascular: Positive for leg swelling  Negative for chest pain and palpitations  Gastrointestinal: Negative for abdominal pain, diarrhea, nausea and vomiting  Skin: Negative for color change, pallor and rash  Psychiatric/Behavioral: Negative for confusion  All other systems reviewed and are negative  Physical Exam  Physical Exam  Vitals and nursing note reviewed  Constitutional:       General: He is not in acute distress  Appearance: He is well-developed  He is not ill-appearing, toxic-appearing or diaphoretic  HENT:      Head: Normocephalic and atraumatic     Cardiovascular: Rate and Rhythm: Normal rate and regular rhythm  Pulmonary:      Effort: Tachypnea present  No accessory muscle usage  Breath sounds: Examination of the right-middle field reveals wheezing  Examination of the left-middle field reveals wheezing  Examination of the right-lower field reveals wheezing and rales  Examination of the left-lower field reveals wheezing and rales  Wheezing and rales present  Abdominal:      Palpations: Abdomen is soft  Musculoskeletal:      Cervical back: Neck supple  Right lower leg: Edema present  Left lower leg: Edema present  Skin:     General: Skin is warm  Capillary Refill: Capillary refill takes less than 2 seconds  Findings: No erythema or rash  Neurological:      Mental Status: He is alert and oriented to person, place, and time     Psychiatric:         Mood and Affect: Mood normal          Behavior: Behavior normal          Vital Signs  ED Triage Vitals   Temperature Pulse Respirations Blood Pressure SpO2   09/22/22 0902 09/22/22 0900 09/22/22 0902 09/22/22 0900 09/22/22 0900   98 4 °F (36 9 °C) (!) 128 (!) 24 102/67 100 %      Temp Source Heart Rate Source Patient Position - Orthostatic VS BP Location FiO2 (%)   09/22/22 0902 09/22/22 1315 09/22/22 1600 09/22/22 1315 --   Oral Monitor Lying Right arm       Pain Score       09/22/22 0902       No Pain           Vitals:    09/22/22 2311 09/23/22 0237 09/23/22 0700 09/23/22 1305   BP: 96/65 95/63 119/82 119/82   Pulse: (!) 116 105 (!) 124 100   Patient Position - Orthostatic VS:  Lying Lying          Visual Acuity      ED Medications  Medications   aspirin (ECOTRIN LOW STRENGTH) EC tablet 81 mg (81 mg Oral Given 9/23/22 0807)   finasteride (PROSCAR) tablet 5 mg (5 mg Oral Given 9/23/22 0807)   umeclidinium bromide (INCRUSE ELLIPTA) 62 5 mcg/inh inhaler AEPB 1 puff (1 puff Inhalation Not Given 9/23/22 1125)   heparin (porcine) 25,000 units in 0 45% NaCl 250 mL infusion (premix) ( Intravenous Canceled Entry 9/22/22 1747)   heparin (porcine) injection 3,900 Units (has no administration in time range)   heparin (porcine) injection 1,950 Units (has no administration in time range)   albuterol (PROVENTIL HFA,VENTOLIN HFA) inhaler 1 puff ( Inhalation Canceled Entry 9/22/22 2220)   benzonatate (TESSALON PERLES) capsule 100 mg (has no administration in time range)   metoprolol tartrate (LOPRESSOR) tablet 25 mg (25 mg Oral Not Given 9/23/22 1651)   sodium bicarbonate tablet 1,300 mg (1,300 mg Oral Not Given 9/23/22 1652)   furosemide (LASIX) injection 80 mg (has no administration in time range)   furosemide (LASIX) injection 40 mg (has no administration in time range)   diltiazem (CARDIZEM) 125 mg in sodium chloride 0 9 % 125 mL infusion (0 mg/hr Intravenous Stopped 9/22/22 1405)   albuterol inhalation solution 5 mg (5 mg Nebulization Given 9/22/22 1230)   ipratropium (ATROVENT) 0 02 % inhalation solution 0 5 mg (0 5 mg Nebulization Given 9/22/22 1230)   predniSONE tablet 50 mg (50 mg Oral Given 9/22/22 1314)   diphenhydrAMINE (BENADRYL) injection 12 5 mg (12 5 mg Intravenous Given 9/22/22 1314)   Famotidine (PF) (PEPCID) injection 20 mg (20 mg Intravenous Given 9/22/22 1432)   metoprolol (LOPRESSOR) injection 5 mg (5 mg Intravenous Given 9/22/22 1509)   heparin (porcine) injection 3,900 Units (3,900 Units Intravenous Given 9/22/22 1630)   albuterol inhalation solution 5 mg (5 mg Nebulization Given 9/22/22 1558)   ipratropium (ATROVENT) 0 02 % inhalation solution 0 5 mg (0 5 mg Nebulization Given 9/22/22 1558)   perflutren lipid microsphere (DEFINITY) injection (0 6 mL/min Intravenous Given 9/23/22 1415)       Diagnostic Studies  Results Reviewed     Procedure Component Value Units Date/Time    Basic metabolic panel [[id number]]  (Abnormal) Collected: 09/23/22 0502    Lab Status: Final result Specimen: Blood from Arm, Left Updated: 09/23/22 0548     Sodium 140 mmol/L      Potassium 5 1 mmol/L      Chloride 107 mmol/L      CO2 13 mmol/L      ANION GAP 20 mmol/L      BUN 56 mg/dL      Creatinine 2 54 mg/dL      Glucose 152 mg/dL      Calcium 8 9 mg/dL      eGFR 23 ml/min/1 73sq m     Narrative:      [place] guidelines for Chronic Kidney Disease (CKD):     Stage 1 with normal or high GFR (GFR > 90 mL/min/1 73 square meters)    Stage 2 Mild CKD (GFR = 60-89 mL/min/1 73 square meters)    Stage 3A Moderate CKD (GFR = 45-59 mL/min/1 73 square meters)    Stage 3B Moderate CKD (GFR = 30-44 mL/min/1 73 square meters)    Stage 4 Severe CKD (GFR = 15-29 mL/min/1 73 square meters)    Stage 5 End Stage CKD (GFR <15 mL/min/1 73 square meters)  Note: GFR calculation is accurate only with a steady state creatinine    Magnesium [[id number]]  (Normal) Collected: 09/23/22 0502    Lab Status: Final result Specimen: Blood from Arm, Left Updated: 09/23/22 0548     Magnesium 2 7 mg/dL     APTT [[id number]]  (Abnormal) Collected: 09/22/22 2338    Lab Status: Final result Specimen: Blood from Arm, Left Updated: 09/23/22 0103     PTT 80 seconds     Blood gas, arterial [[id number]]  (Abnormal) Collected: 09/22/22 2104    Lab Status: Final result Specimen: Blood, Arterial from Radial, Right Updated: 09/22/22 2136     pH, Arterial 7 384     pCO2, Arterial 34 1 mm Hg      pO2, Arterial 109 7 mm Hg      HCO3, Arterial 19 9 mmol/L      Base Excess, Arterial -4 2 mmol/L      O2 Content, Arterial 22 6 mL/dL      O2 HGB,Arterial  97 2 %      SOURCE Radial, Right     SERA TEST Yes     Nasal Cannula 3    HS Troponin I 4hr [[id number]]  (Normal) Collected: 09/22/22 1442    Lab Status: Final result Specimen: Blood from Arm, Left Updated: 09/22/22 1521     hs TnI 4hr 46 ng/L      Delta 4hr hsTnI -4 ng/L     APTT six (6) hours after Heparin bolus/drip initiation or dosing change [[id number]]     Lab Status: No result Specimen: Blood     FLU/RSV/COVID - if FLU/RSV clinically relevant [[id number]]  (Normal) Collected: 09/22/22 1856 Lab Status: Final result Specimen: Nares from Nose Updated: 09/22/22 1246     SARS-CoV-2 Negative     INFLUENZA A PCR Negative     INFLUENZA B PCR Negative     RSV PCR Negative    Narrative:      FOR PEDIATRIC PATIENTS - copy/paste COVID Guidelines URL to browser: [email or web address]    SARS-CoV-2 assay is a Nucleic Acid Amplification assay intended for the  qualitative detection of nucleic acid from SARS-CoV-2 in nasopharyngeal  swabs  Results are for the presumptive identification of SARS-CoV-2 RNA  Positive results are indicative of infection with SARS-CoV-2, the virus  causing COVID-19, but do not rule out bacterial infection or co-infection  with other viruses  Laboratories within the [place] and its  territories are required to report all positive results to the appropriate  public health authorities  Negative results do not preclude SARS-CoV-2  infection and should not be used as the sole basis for treatment or other  patient management decisions  Negative results must be combined with  clinical observations, patient history, and epidemiological information  This test has not been FDA cleared or approved  This test has been authorized by FDA under an Emergency Use Authorization  (EUA)  This test is only authorized for the duration of time the  declaration that circumstances exist justifying the authorization of the  emergency use of an in vitro diagnostic tests for detection of SARS-CoV-2  virus and/or diagnosis of COVID-19 infection under section 564(b)(1) of  the Act, 21 U  S C  401EOS-6(X)(2), unless the authorization is terminated  or revoked sooner  The test has been validated but independent review by FDA  and CLIA is pending  Test performed using [employer] GeneXpert: This RT-PCR assay targets N2,  a region unique to SARS-CoV-2   A conserved region in the E-gene was chosen  for pan-Sarbecovirus detection which includes SARS-CoV-2  According to CMS-2020-01-R, this platform meets the definition of high-throughput technology  HS Troponin I 2hr [[id number]]  (Normal) Collected: 09/22/22 1205    Lab Status: Final result Specimen: Blood from Arm, Right Updated: 09/22/22 1239     hs TnI 2hr 48 ng/L      Delta 2hr hsTnI -2 ng/L     POCT Blood Gas (CG8+) [[id number]]  (Abnormal) Collected: 09/22/22 1214    Lab Status: Final result Specimen: Arterial Updated: 09/22/22 1217     pH, Art i-STAT 7 414     pCO2, Art i-STAT 33 6 mm HG      pO2, ART i-STAT 82 0 mm HG      BE, i-STAT -3 mmol/L      HCO3, Art i-STAT 21 5 mmol/L      CO2, i-STAT 23 mmol/L      O2 Sat, i-STAT 96 %      SODIUM, I-STAT 141 mmol/l      Potassium, i-STAT 4 2 mmol/L      Calcium, Ionized i-STAT 1 21 mmol/L      Hct, i-STAT 24 %      Hgb, i-STAT 8 2 g/dl      Glucose, i-STAT 119 mg/dl      POC FIO2 33 L      Specimen Type ARTERIAL     Delivery System Nasal Cannula     SITE Right Radial     SERA TEST Positive Allens Test    B-Type Natriuretic Peptide(BNP) AN, [place], [place] Campuses Only [[id number]]  (Abnormal) Collected: 09/22/22 1027    Lab Status: Final result Specimen: Blood from Arm, Right Updated: 09/22/22 1133     BNP 5,443 pg/mL     TSH [[id number]]  (Normal) Collected: 09/22/22 1027    Lab Status: Final result Specimen: Blood from Arm, Right Updated: 09/22/22 1113     TSH 3RD GENERATON 1 514 uIU/mL     Narrative:      Patients undergoing fluorescein dye angiography may retain small amounts of fluorescein in the body for 48-72 hours post procedure  Samples containing fluorescein can produce falsely depressed TSH values  If the patient had this procedure,a specimen should be resubmitted post fluorescein clearance        HS Troponin 0hr (reflex protocol) [[id number]]  (Abnormal) Collected: 09/22/22 1027    Lab Status: Final result Specimen: Blood from Arm, Right Updated: 09/22/22 1104     hs TnI 0hr 50 ng/L     Protime-INR [[id number]]  (Abnormal) Collected: 09/22/22 1027 Lab Status: Final result Specimen: Blood from Arm, Right Updated: 09/22/22 1055     Protime 16 5 seconds      INR 1 31    APTT [[id number]]  (Normal) Collected: 09/22/22 1027    Lab Status: Final result Specimen: Blood from Arm, Right Updated: 09/22/22 1055     PTT 33 seconds     Basic metabolic panel [[id number]]  (Abnormal) Collected: 09/22/22 1027    Lab Status: Final result Specimen: Blood from Arm, Right Updated: 09/22/22 1055     Sodium 142 mmol/L      Potassium 4 7 mmol/L      Chloride 107 mmol/L      CO2 26 mmol/L      ANION GAP 9 mmol/L      BUN 44 mg/dL      Creatinine 1 84 mg/dL      Glucose 109 mg/dL      Calcium 9 0 mg/dL      eGFR 35 ml/min/1 73sq m     Narrative:      [place] guidelines for Chronic Kidney Disease (CKD):     Stage 1 with normal or high GFR (GFR > 90 mL/min/1 73 square meters)    Stage 2 Mild CKD (GFR = 60-89 mL/min/1 73 square meters)    Stage 3A Moderate CKD (GFR = 45-59 mL/min/1 73 square meters)    Stage 3B Moderate CKD (GFR = 30-44 mL/min/1 73 square meters)    Stage 4 Severe CKD (GFR = 15-29 mL/min/1 73 square meters)    Stage 5 End Stage CKD (GFR <15 mL/min/1 73 square meters)  Note: GFR calculation is accurate only with a steady state creatinine    Magnesium [[id number]]  (Normal) Collected: 09/22/22 1027    Lab Status: Final result Specimen: Blood from Arm, Right Updated: 09/22/22 1055     Magnesium 2 4 mg/dL     CBC and differential [[id number]]  (Abnormal) Collected: 09/22/22 1027    Lab Status: Final result Specimen: Blood from Arm, Right Updated: 09/22/22 1051     WBC 4 34 Thousand/uL      RBC 2 33 Million/uL      Hemoglobin 8 3 g/dL      Hematocrit 25 9 %       fL      MCH 35 6 pg      MCHC 32 0 g/dL      RDW 14 2 %      MPV 11 8 fL      Platelets 58 Thousands/uL      nRBC 1 /100 WBCs      Neutrophils Relative 71 %      Immat GRANS % 1 %      Lymphocytes Relative 16 %      Monocytes Relative 11 %      Eosinophils Relative 1 % Basophils Relative 0 %      Neutrophils Absolute 3 07 Thousands/µL      Immature Grans Absolute 0 04 Thousand/uL      Lymphocytes Absolute 0 71 Thousands/µL      Monocytes Absolute 0 49 Thousand/µL      Eosinophils Absolute 0 02 Thousand/µL      Basophils Absolute 0 01 Thousands/µL                  US kidney and bladder   Final Result by Jozef Chen MD (09/23 1035)      1  Mildly diminutive size of the kidneys is unchanged  No hydronephrosis  2   Small-volume ascites    3  Gallbladder wall thickening is likely reactive  No findings to suggest acute cholecystitis  Workstation performed: [id number]         XR chest 1 view portable   Final Result by Felipe Blankenship DO (09/22 1145)      Stable scarring and small effusion within the right lung base  Calcified pleural plaque projects over the right apex  Interval cardiac enlargement when compared with 2018 x-ray, some of which is related to supine portable technique  Workstation performed: [id number]                    Procedures  ECG 12 Lead Documentation Only    Date/Time: 9/22/2022 10:38 AM  Performed by: Latrice Monterroso PA-C  Authorized by: Latrice Monterroso PA-C     Indications / Diagnosis:  Dyspnea  ECG reviewed by me, the ED Provider: yes    Patient location:  ED  Previous ECG:     Previous ECG:  Unavailable    Comparison to cardiac monitor: Yes    Interpretation:     Interpretation: abnormal    Rate:     ECG rate:  113    ECG rate assessment: tachycardic    Rhythm:     Rhythm: atrial fibrillation    Ectopy:     Ectopy: PVCs    QRS:     QRS axis:  Normal    QRS intervals:  Normal  Conduction:     Conduction: abnormal      Abnormal conduction: complete LBBB    ST segments:     ST segments:  Non-specific  T waves:     T waves: inverted      Inverted:  AVL, V5 and V6  Comments:      T-wave inversion present in the lateral leads concerning for ischemia  New onset atrial fibrillation               ED Course  ED Course as of 09/23/22 1704   u Sep 22, 2022   1159 Blood consent signed  Patient has inspiratory and expiratory wheezes  He is quite tachypneic at bedside  His heart rate increased to 150  His pressure is 112/75  Will continue with the Cardizem drip  Patient will require blood from volume replacement  We will plan to give him nitro and Lasix during and after the blood transfusion  Will plan to give him a Duonebat this time to see if this does not help with his symptoms  1250 I spoke to the advanced provider from [address] has a black box cardiovascular morning on it  They recommend treating the hypersensitivity reaction  The patient will be admitted  Will place consult  HEART Risk Score    Flowsheet Row Most Recent Value   Heart Score Risk Calculator    History 2 Filed at: 09/22/2022 1124   ECG 2  [Inverted T-waves in the lateral leads] Filed at: 09/22/2022 1124   Age 2 Filed at: 09/22/2022 1124   Risk Factors 1 Filed at: 09/22/2022 1124   Troponin 2 Filed at: 09/22/2022 1124   HEART Score 9 Filed at: 09/22/2022 1124                     Initial Sepsis Screening     Row Name 09/22/22 1331                Is the patient's history suggestive of a new or worsening infection? No  -EP        Suspected source of infection --        Are two or more of the following signs & symptoms of infection both present and new to the patient? No  -EP        Indicate SIRS criteria --        If the answer is yes to both questions, suspicion of sepsis is present --        If severe sepsis is present AND tissue hypoperfusion perists in the hour after fluid resuscitation or lactate > 4, the patient meets criteria for SEPTIC SHOCK --        Are any of the following organ dysfunction criteria present within 6 hours of suspected infection and SIRS criteria that are NOT considered to be chronic conditions?  --        Organ dysfunction --        Date of presentation of severe sepsis -- Time of presentation of severe sepsis --        Tissue hypoperfusion persists in the hour after crystalloid fluid administration, evidenced, by either: --        Was hypotension present within one hour of the conclusion of crystalloid fluid administration? --        Date of presentation of septic shock --        Time of presentation of septic shock --              User Key  (r) = Recorded By, (t) = Taken By, (c) = Cosigned By    [address] Name Provider Isamar Khan MD Physician                    Pradip Mora Criteria for PE    Flowsheet Row Most Recent Value   Wells' Criteria for PE    Clinical signs and symptoms of DVT 0 Filed at: 09/22/2022 1127   PE is primary diagnosis or equally likely 3 Filed at: 09/22/2022 1127   HR >100 1 5 Filed at: 09/22/2022 1127   Immobilization at least 3 days or Surgery in the previous 4 weeks 1 5 Filed at: 09/22/2022 1127   Previous, objectively diagnosed PE or DVT 0 Filed at: 09/22/2022 1127   Hemoptysis 0 Filed at: 09/22/2022 1127   Malignancy with treatment within 6 months or palliative 1 Filed at: 09/22/2022 1127   Darron' Criteria Total 7 Filed at: 09/22/2022 1127                MDM  Number of Diagnoses or Management Options  CHF (congestive heart failure) ([address] ): new and requires workup  COPD exacerbation ([address] ): new and requires workup  Dermatitis: new and requires workup  Hypersensitivity reaction, initial encounter: new and requires workup     Amount and/or Complexity of Data Reviewed  Clinical lab tests: ordered and reviewed  Tests in the radiology section of CPT®: ordered and reviewed  Tests in the medicine section of CPT®: ordered and reviewed    Risk of Complications, Morbidity, and/or Mortality  Presenting problems: high  Diagnostic procedures: high  Management options: high    Patient Progress  Patient progress: stable      Disposition  Final diagnoses:   CHF (congestive heart failure) ([facility] )   Hypersensitivity reaction, initial encounter   Dermatitis   COPD exacerbation ([facility] )     Time reflects when diagnosis was documented in both MDM as applicable and the Disposition within this note     Time User Action Codes Description Comment    9/22/2022  1:03 PM Allyne Zeinab Add [I50 9] CH[facility] (congestive [facility] failure) ([facility] )     9/22/2022  1:03 PM Gutzweiler, Pearlean Claw Add [T78 40XA] Hypersensitivity reaction, initial encounter     9/22/2022  1:03 PM Allyne Zeinab Add [L30 9] Dermatitis     9/22/2022  1:08 PM Allyne Zeinab Add [J44 1] COPD exacerbation ([facility] )     9/23/2022  6:44 AM Marielle [place] Add [N17 9] MARLENI (acute kidney injury) [facility])       ED Disposition     ED Disposition   Admit    Condition   Stable    Date/Time   u Sep 22, 2022  1:08 PM    Comment   Case was discussed with Dr Celina Reese and the patient's admission status was agreed to be Admission Status: inpatient status to the service of Dr Celina Reese   Follow-up Information    None         Current Discharge Medication List      CONTINUE these medications which have NOT CHANGED    Details   aspirin (ECOTRIN LOW STRENGTH) 81 mg EC tablet Take 81 mg by mouth daily      finasteride (PROSCAR) 5 mg tablet Take 1 tablet (5 mg total) by mouth daily  Qty: 90 tablet, Refills: 3    Associated Diagnoses: Incomplete emptying of bladder      furosemide (LASIX) 20 mg tablet Take 1 tablet (20 mg total) by mouth 2 (two) times a day  Qty: 180 tablet, Refills: 3    Associated Diagnoses: Chronic systolic congestive heart failure (HCC)      metoprolol succinate (TOPROL-XL) 25 mg 24 hr tablet Take 1 tablet (25 mg total) by mouth daily  Qty: 90 tablet, Refills: 3    Associated Diagnoses: NICM (nonischemic cardiomyopathy) ([facility] )      ! ! umeclidinium bromide (Incruse Ellipta) 62 5 mcg/inh AEPB inhaler Inhale 1 puff daily  Qty: 90 blister, Refills: 3    Comments: Requesting 1 year supply  Associated Diagnoses: COPD, severe ([facility] )      ! ! umeclidinium bromide (Incruse Ellipta) 62 5 mcg/inh AEPB inhaler Inhale 1 puff daily  Qty: 90 blister, Refills: 3    Associated Diagnoses: Centrilobular emphysema (HCC)      Ventolin  (90 Base) MCG/ACT inhaler USE 2 INHALATIONS BY MOUTH  EVERY 6 HOURS AS NEEDED FOR WHEEZING  Qty: 72 g, Refills: 3    Comments: Requesting 1 year supply  Associated Diagnoses: COPD, severe (HCC)      predniSONE 20 mg tablet Take 1 tablet (20 mg total) by mouth daily  Qty: 30 tablet, Refills: 3    Associated Diagnoses: Aplastic anemia ([facility])      sacubitril-valsartan (Entresto) 49-51 MG TABS Take 1 tablet by mouth 2 (two) times a day  Qty: 180 tablet, Refills: 3    Associated Diagnoses: NICM (nonischemic cardiomyopathy) ([facility] [address] ); Chronic systolic congestive heart failure ([facility] [address] )       ! ! - Potential duplicate medications found  Please discuss with provider  No discharge procedures on file      PDMP Review     None          ED Provider  Electronically Signed by           Angélica Sandoval PA-C  09/23/22 2097

## 2022-09-22 NOTE — ASSESSMENT & PLAN NOTE
-heme Onc consulted regarding recent infusion of aranesp(darbepoetin mady), which seemed to correlate with his worsening SOB   -monitoring CBC  -is monitoring INR

## 2022-09-22 NOTE — ASSESSMENT & PLAN NOTE
Acute respiratory failure, likely secondary to combination of atrial fibrillation and acute on chronic heart failure  Primarily managing the treatment these underlying conditions      -Ipratropium albuterol nebulizer q 6h  -Umeclidinium bromide daily  -albuterol 4 times a day  -Respiratory protocol

## 2022-09-22 NOTE — H&P
[facility]  H&P- Jayne Jorge 1945, 78 y o  male MRN: [id number]  Unit/Bed#: ED-25 Encounter: [id number]  Primary Care Provider: Alba Jiang,    Date and time admitted to hospital: 9/22/2022  8:55 AM    CHF, acute on chronic [facility])  Assessment & Plan  Wt Readings from Last 3 Encounters:   09/22/22 68 9 kg (152 lb)   09/16/22 68 9 kg (152 lb)   09/12/22 69 9 kg (154 lb)     Patient's wife says that his typical weight is around 160 lbs  He is having pedal edema today, but it is not as bad as it has been best   Previous echo demonstrated 25% EF     -started furosemide 40 mg IV b i d   -I/O's  -daily weight  -cardiology consulted  -echocardiogram  -cardiovascular diet, sodium restricted 2 g  -Afib treatment as above  Acute respiratory failure with hypoxia (HCC)  Assessment & Plan  Acute respiratory failure, likely secondary to combination of atrial fibrillation and acute on chronic heart failure  Primarily managing the treatment these underlying conditions  -Ipratropium albuterol nebulizer q 6h  -Umeclidinium bromide daily  -albuterol 4 times a day  -Respiratory protocol      A-fib [facility])  Assessment & Plan  The patient presented with atrial fibrillation and has been rate controlled with Lopressor     -holding home beta-blocker while on IV  Lopressor   -cardiology consulted  -on telemetry   -anticoagulation with heparin drip  CKD (chronic kidney disease)  Assessment & Plan  Lab Results   Component Value Date    EGFR 35 09/22/2022    EGFR 41 05/25/2022    EGFR 29 01/19/2022    CREATININE 1 84 (H) 09/22/2022    CREATININE 1 61 (H) 05/25/2022    CREATININE 2 11 (H) 01/19/2022     -Monitoring BMP daily    -Daily magnesium level  Aplastic anemia ([facility] [address] )  Assessment & Plan  -heme Onc consulted regarding recent infusion of aranesp(darbepoetin mady), which seemed to correlate with his worsening SOB   -monitoring CBC  -is monitoring INR      Essential hypertension  Assessment & Plan  Blood pressure has been controlled while inpatient most recently 114/65  -holding home beta-blocker while addressing AFib  VTE Pharmacologic Prophylaxis: VTE Score: 3 Moderate Risk (Score 3-4) - Pharmacological DVT Prophylaxis Ordered: heparin drip  Code Status:  DNR/DNI level 3  Discussion with family: Updated  (wife) at bedside  Anticipated Length of Stay: Patient will be admitted on an inpatient basis with an anticipated length of stay of greater than 2 midnights secondary to Atrial fibrillation complicated by acute on chronic CHF  Chief Complaint:  Worsening difficulty breathing for 2 weeks  History of Present Illness:  Paty Post is a 78 y o  male with a PMH of COPD, CHF (EF 25%), stroke, expressive aphasia, CKD, HTN, and an aplastic anemia (on darbepoetin mady) who presents with 2 weeks of worsening shortness of breath  Most of the history was taken from his wife, due to his expressive aphasia, however he confirmed details along with the story  Two weeks ago when he started having shortness of breath, he had an infusion of Aranesp for his aplastic anemia last week and last week he had  His Entresto inhaler changed to an Anoro inhaler by his doctor  He also takes a Ventolin inhaler  Despite using these inhalers, he has not had improvement of his shortness of breath  He claims to be taking the furosemide twice daily as prescribed  This morning, nothing in particular worsened, however his wife had finally convinced him to come to the hospital regarding his breathing  In the emergency department, he had been found to have atrial fibrillation  The patient and his wife do not remember him having this before  He did not complain of any palpitations  He also denies any fevers, chills, nausea, vomiting, chest pain, abdominal pain, diarrhea, or worsening swelling in his legs    He does endorse having congestion and wheezing, as well as a recent rash on his chest, which appeared around the time that the inhalers changed  It has since been getting better  Review of Systems:  Review of Systems   Constitutional: Negative for chills and fever  HENT: Positive for congestion  Negative for ear pain and sore throat  Eyes: Negative for pain and visual disturbance  Respiratory: Positive for shortness of breath and wheezing  Negative for cough  Cardiovascular: Positive for leg swelling  Negative for chest pain and palpitations  Gastrointestinal: Negative for abdominal pain, diarrhea, nausea and vomiting  Genitourinary: Negative for dysuria and hematuria  Musculoskeletal: Negative for arthralgias and back pain  Skin: Negative for color change and rash  Neurological: Negative for light-headedness and headaches  All other systems reviewed and are negative  Past Medical and Surgical History:   Past Medical History:   Diagnosis Date    Cardiomyopathy [facility])     CHF (congestive heart failure) ([facility] [address] )     COPD (chronic obstructive pulmonary disease) ([facility])     Dyspnea on exertion     Essential hypertension     Non-ischemic cardiomyopathy ([facility])        Past Surgical History:   Procedure Laterality Date    IR BIOPSY BONE MARROW  5/26/2021    PILONIDAL CYST EXCISION         Meds/Allergies:  Prior to Admission medications    Medication Sig Start Date End Date Taking?  Authorizing Provider   aspirin (ECOTRIN LOW STRENGTH) 81 mg EC tablet Take 81 mg by mouth daily   Yes Historical Provider, MD   finasteride (PROSCAR) 5 mg tablet Take 1 tablet (5 mg total) by mouth daily 9/6/22  Yes Alena Milian PA-C   furosemide (LASIX) 20 mg tablet Take 1 tablet (20 mg total) by mouth 2 (two) times a day 7/14/22  Yes Anna Andrade MD   metoprolol succinate (TOPROL-XL) 25 mg 24 hr tablet Take 1 tablet (25 mg total) by mouth daily 3/24/22  Yes Anna Andrade MD   umeclidinium bromide (Incruse Ellipta) 62 5 mcg/inh AEPB inhaler Inhale 1 puff daily 9/21/22 23 Yes Mamta Banda, DO   umeclidinium bromide (Incruse Ellipta) 62 5 mcg/inh AEPB inhaler Inhale 1 puff daily 22 Yes Ross Banda, DO   Ventolin  (90 Base) MCG/ACT inhaler USE 2 INHALATIONS BY MOUTH  EVERY 6 HOURS AS NEEDED FOR WHEEZING 21  Yes Mamta Banda DO   predniSONE 20 mg tablet Take 1 tablet (20 mg total) by mouth daily  Patient not taking: Reported on 2022   Harjit Miller MD   sacubitril-valsartan Valentine Logan) 49-51 MG TABS Take 1 tablet by mouth 2 (two) times a day  Patient not taking: Reported on 2022   Hermelinda Andrews MD     I have reviewed home medications with patient personally  As well as with his wife    Allergies: Allergies   Allergen Reactions    Aranesp (Albumin Free) [Darbepoetin Sebastian] Other (See Comments)     POSSIBLE reaction; Tachycardia, tachypnea, CHF, edema, macular erythematous rash on chest  ED Visit 3 days after 1st dose (22)  Social History:  He lives with his wife at home    Marital Status: /Civil Union   Occupation:  Previously worked at [employer], 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  Patient Pre-hospital Living Situation: Home  Patient Pre-hospital Level of Mobility: walks  Patient Pre-hospital Diet Restrictions:  None  Substance Use History:   Social History     Substance and Sexual Activity   Alcohol Use Not Currently    Alcohol/week: 2 0 standard drinks    Types: 2 Cans of beer per week     Social History     Tobacco Use   Smoking Status Former Smoker    Packs/day: 1 00    Years: 63 00    Pack years: 63 00    Types: Cigarettes    Start date:     Quit date: 2018    Years since quittin 7   Smokeless Tobacco Never Used     Social History     Substance and Sexual Activity   Drug Use No       Family History:  Family History   Problem Relation Age of Onset   Manuel Escamilla Breast cancer Mother 55    Hypertension Father     Cancer Father     Cancer Family     Heart disease Maternal Uncle     Coronary artery disease Maternal Uncle     Heart failure Maternal Uncle     Breast cancer Sister 48    Heart attack Neg Hx     Stroke Neg Hx     Anuerysm Neg Hx     Arrhythmia Neg Hx     Clotting disorder Neg Hx     Hyperlipidemia Neg Hx        Physical Exam:     Vitals:   Blood Pressure: 123/59 (09/22/22 1600)  Pulse: (!) 108 (09/22/22 1600)  Temperature: 97 5 °F (36 4 °C) (09/22/22 1330)  Temp Source: Oral (09/22/22 1330)  Respirations: 18 (09/22/22 1600)  Height: 5' 8" (172 7 cm) (09/22/22 0902)  Weight - Scale: 68 9 kg (152 lb) (09/22/22 0902)  SpO2: 100 % (09/22/22 1600)    Physical Exam  Vitals and nursing note reviewed  Constitutional:       General: He is not in acute distress  Appearance: He is well-developed  HENT:      Head: Normocephalic and atraumatic  Nose: Nose normal  No rhinorrhea  Mouth/Throat:      Mouth: Mucous membranes are moist    Eyes:      General: No scleral icterus  Conjunctiva/sclera: Conjunctivae normal    Cardiovascular:      Rate and Rhythm: Normal rate  Rhythm irregular  Heart sounds: Normal heart sounds  No murmur heard  Pulmonary:      Effort: Pulmonary effort is normal  No respiratory distress  Breath sounds: Normal breath sounds  No wheezing  Comments: Lungs clear to auscultation bilaterally    The patient is having pursed lip breathing  Abdominal:      Palpations: Abdomen is soft  Tenderness: There is no abdominal tenderness  Musculoskeletal:      Cervical back: Neck supple  Right lower leg: Edema present  Left lower leg: Edema present  Skin:     General: Skin is warm and dry  Capillary Refill: Capillary refill takes less than 2 seconds  Neurological:      Mental Status: He is alert and oriented to person, place, and time  Comments: Patient has Broca aphasia  He is able to respond appropriately, though gets frustrated when he cannot really express himself     Psychiatric: Mood and Affect: Mood normal          Behavior: Behavior normal           Additional Data:     Lab Results:  Results from last 7 days   Lab Units 09/22/22  1214 09/22/22  1027   WBC Thousand/uL  --  4 34   HEMOGLOBIN g/dL  --  8 3*   I STAT HEMOGLOBIN g/dl 8 2*  --    HEMATOCRIT %  --  25 9*   HEMATOCRIT, ISTAT % 24*  --    PLATELETS Thousands/uL  --  58*   NEUTROS PCT %  --  71   LYMPHS PCT %  --  16   MONOS PCT %  --  11   EOS PCT %  --  1     Results from last 7 days   Lab Units 09/22/22  1214 09/22/22  1027   SODIUM mmol/L  --  142   POTASSIUM mmol/L  --  4 7   CHLORIDE mmol/L  --  107   CO2 mmol/L  --  26   CO2, I-STAT mmol/L 23  --    BUN mg/dL  --  44*   CREATININE mg/dL  --  1 84*   ANION GAP mmol/L  --  9   CALCIUM mg/dL  --  9 0   GLUCOSE RANDOM mg/dL  --  109     Results from last 7 days   Lab Units 09/22/22  1027   INR  1 31*                   Imaging: Reviewed radiology reports from this admission including: chest xray  XR chest 1 view portable   Final Result by Felipe Higuera DO (09/22 1145)      Stable scarring and small effusion within the right lung base  Calcified pleural plaque projects over the right apex  Interval cardiac enlargement when compared with 2018 x-ray, some of which is related to supine portable technique  Workstation performed: [id number]             EKG and Other Studies Reviewed on Admission:   · EKG: AFib with RVR, left bundle-branch block, PVCs  Heart rate 113  ** Please Note: This note has been constructed using a voice recognition system   **

## 2022-09-22 NOTE — ASSESSMENT & PLAN NOTE
Blood pressure has been controlled while inpatient most recently 114/65  -holding home beta-blocker while addressing AFib

## 2022-09-22 NOTE — CONSULTS
Medical Oncology/Hematology Consult Note  Moriah Devi, male, 78 y  o , 1945,  ED-25/ED-25, [id number]     Assessment and Plan    1  Possible hypersensitivity reaction to aranesp  2  CHF   3  new onset Afib  - Pt presented with increased wheezing, SOB, and worsening rash on chest; Pt did receive first injection of aranesp on 9/19  Wife tells me symptoms started prior to aranesp, but have been worse since the injection    - Unclear if this is a hypersensitivity reaction to aranesp with cardiovascular event  I did add aranesp as an allergen just in case  Given symptoms present prior to aranesp, cannot determine if possibly aranesp was exacerbated pre-existing issue  Pt has already received benadryl, steroids and H2 blocker; rash has almost completely resolved  Recommend observation and management of comorbidities; would avoid future hypersensitivity medications unless clear recurrence of hypersensitivity reaction  - Cardiology has been consulted    4  Non-severe aplastic anemia  5  Thrombocytopenia   - Follows with Dr Desiree Quiroz and Jerilyn Taylor PA-C for this; this was first dose of aranesp, unclear if this reaction as per above  Removed regimen to be safe and will formally update Dr Lamine Valle team   -  Pt hgb stable at 8 3, ED giving patient 1UPRBCs now  -  platelets 13E; given stroke history, and now new onset afib would recommend full dose anticoagulation with close monitoring given thrombocytopenia  6  Acute on chronic kidney injury  - management per primary team    Outpatient follow up plan: Pt has follow up with Dr Desiree Quiroz 10/31/22 scheduled; will cancel future infusion appt, and try to get Dr Desiree Quiroz appt moved to sooner date  Communication with patient/family:  I did update the patient, as well as his wife at bedside  Communication with team:  Did communicate with Dr Lily Jacinto, primary team  I also spoke with JAZ with cardiology       I did review this patient with Dr Patrick and she is in agreement with this plan  Amanda Mendiola, [facility]  Hematology/Oncology Nurse Practitioner       Reason for consultation: possible aranesp reaction; aplastic anemia and thrombocytopenia    History of present illness:  Skip Valdivia is a 78 y o  male with a PMHX of CKD, CHF, stroke, COPD, and pancytopenia with history of nonsevere aplastic anemia, diagnosed May 2021; pt has been on observation, until 9/19/22 he started first dose of aranesp  He presented to ER with increased SOB, wheezing, swelling BLE, and was found to be in heart failure with new onset of Afib  Wife reports patient has had increased wheezing and SOB since March, and pulmonologist has been adjusting medications  He also has a been more fatigued  He did have new onset of rash on chest and arms, but this has been present for weeks since starting Amoro, new inhaler  Pt has been having increased SOB at rest and using inhalers more than he is supposed to as he is so short of breath  Wife is not sure that since aranesp any of these symptoms have really worsened more  Pt tells me he has more swelling in legs, but that he stopped his water pills  Pt up about 5 lbs recently  Wife assisting with history given patient's aphasia  Review of Systems:   Review of Systems   Constitutional: Positive for fatigue  Negative for fever  HENT: Negative for facial swelling and trouble swallowing  Respiratory: Positive for cough, shortness of breath and wheezing  Negative for chest tightness  Cardiovascular: Positive for leg swelling  Negative for chest pain  Gastrointestinal: Negative for abdominal pain, constipation, diarrhea, nausea and vomiting  Endocrine: Negative for polyuria  Genitourinary: Negative for difficulty urinating  Musculoskeletal: Negative for back pain  Skin: Positive for rash  Neurological: Negative for dizziness, syncope and light-headedness  Psychiatric/Behavioral: Negative for confusion           Oncology History:   Cancer Staging  No matching staging information was found for the patient  Oncology History    No history exists         Past Medical History:   Diagnosis Date    Cardiomyopathy [facility])     CHF (congestive heart failure) (HCC)     COPD (chronic obstructive pulmonary disease) (HCC)     Dyspnea on exertion     Essential hypertension     Non-ischemic cardiomyopathy (HCC)        Past Surgical History:   Procedure Laterality Date    IR BIOPSY BONE MARROW  2021    PILONIDAL CYST EXCISION         Family History   Problem Relation Age of Onset    Breast cancer Mother 55    Hypertension Father     Cancer Father     Cancer Family     Heart disease Maternal Uncle     Coronary artery disease Maternal Uncle     Heart failure Maternal Uncle     Breast cancer Sister 48    Heart attack Neg Hx     Stroke Neg Hx     Anuerysm Neg Hx     Arrhythmia Neg Hx     Clotting disorder Neg Hx     Hyperlipidemia Neg Hx        Social History     Socioeconomic History    Marital status: /Civil Union     Spouse name: None    Number of children: None    Years of education: None    Highest education level: None   Occupational History    None   Tobacco Use    Smoking status: Former Smoker     Packs/day: 1 00     Years: 63 00     Pack years: 63 00     Types: Cigarettes     Start date:      Quit date:      Years since quittin 7    Smokeless tobacco: Never Used   Vaping Use    Vaping Use: Never used   Substance and Sexual Activity    Alcohol use: Not Currently     Alcohol/week: 2 0 standard drinks     Types: 2 Cans of beer per week    Drug use: No    Sexual activity: None   Other Topics Concern    None   Social History Narrative    None     Social Determinants of Health     Financial Resource Strain: Not on file   Food Insecurity: Not on file   Transportation Needs: Not on file   Physical Activity: Not on file   Stress: Not on file   Social Connections: Not on file   Intimate Partner Violence: Not on file   Housing Stability: Not on file         Current Facility-Administered Medications:     Famotidine (PF) (PEPCID) injection 20 mg, 20 mg, Intravenous, Once, [employer], CRNP    furosemide (LASIX) injection 20 mg, 20 mg, Intravenous, Once, Trang ENRIQUE Adams    nitroglycerin (NITRO-BID) 2 % TD ointment 1 inch, 1 inch, Topical, Once, Trang Adams PA-C    Current Outpatient Medications:     aspirin (ECOTRIN LOW STRENGTH) 81 mg EC tablet, Take 81 mg by mouth daily, Disp: , Rfl:     finasteride (PROSCAR) 5 mg tablet, Take 1 tablet (5 mg total) by mouth daily, Disp: 90 tablet, Rfl: 3    furosemide (LASIX) 20 mg tablet, Take 1 tablet (20 mg total) by mouth 2 (two) times a day, Disp: 180 tablet, Rfl: 3    metoprolol succinate (TOPROL-XL) 25 mg 24 hr tablet, Take 1 tablet (25 mg total) by mouth daily, Disp: 90 tablet, Rfl: 3    umeclidinium bromide (Incruse Ellipta) 62 5 mcg/inh AEPB inhaler, Inhale 1 puff daily, Disp: 90 blister, Rfl: 3    umeclidinium bromide (Incruse Ellipta) 62 5 mcg/inh AEPB inhaler, Inhale 1 puff daily, Disp: 90 blister, Rfl: 3    Ventolin  (90 Base) MCG/ACT inhaler, USE 2 INHALATIONS BY MOUTH  EVERY 6 HOURS AS NEEDED FOR WHEEZING, Disp: 72 g, Rfl: 3    predniSONE 20 mg tablet, Take 1 tablet (20 mg total) by mouth daily (Patient not taking: Reported on 9/19/2022), Disp: 30 tablet, Rfl: 3    sacubitril-valsartan (Entresto) 49-51 MG TABS, Take 1 tablet by mouth 2 (two) times a day (Patient not taking: Reported on 9/19/2022), Disp: 180 tablet, Rfl: 3    (Not in a hospital admission)      No Known Allergies      Physical Exam:     /65   Pulse (!) 144   Temp 97 5 °F (36 4 °C) (Oral)   Resp 18   Ht 5' 8" (1 727 m)   Wt 68 9 kg (152 lb)   SpO2 100%   BMI 23 11 kg/m²     Physical Exam  General: Appearance: alert, cooperative, no acute distress    HEENT: NC/AT  Lungs: CTA right lung, some wheezing left lung field, Respirations unlabored  Cardiac: tachycardic  Abdomen: Soft, non-tender, non-distended  Bowel sounds are normal  No masses, no organomegaly  Extremities:  +2 pitting edema; No cyanosis, clubbing  Skin: Skin color, turgor are normal  No rashes  Lymphatics: no palpable adenopathy  Neurologic: Alert and oriented X 3    Expressive aphasia      Recent Results (from the past 48 hour(s))   CBC and differential    Collection Time: 09/22/22 10:27 AM   Result Value Ref Range    WBC 4 34 4 31 - 10 16 Thousand/uL    RBC 2 33 (L) 3 88 - 5 62 Million/uL    Hemoglobin 8 3 (L) 12 0 - 17 0 g/dL    Hematocrit 25 9 (L) 36 5 - 49 3 %     (H) 82 - 98 fL    MCH 35 6 (H) 26 8 - 34 3 pg    MCHC 32 0 31 4 - 37 4 g/dL    RDW 14 2 11 6 - 15 1 %    MPV 11 8 8 9 - 12 7 fL    Platelets 58 (L) 274 - 390 Thousands/uL    nRBC 1 /100 WBCs    Neutrophils Relative 71 43 - 75 %    Immat GRANS % 1 0 - 2 %    Lymphocytes Relative 16 14 - 44 %    Monocytes Relative 11 4 - 12 %    Eosinophils Relative 1 0 - 6 %    Basophils Relative 0 0 - 1 %    Neutrophils Absolute 3 07 1 85 - 7 62 Thousands/µL    Immature Grans Absolute 0 04 0 00 - 0 20 Thousand/uL    Lymphocytes Absolute 0 71 0 60 - 4 47 Thousands/µL    Monocytes Absolute 0 49 0 17 - 1 22 Thousand/µL    Eosinophils Absolute 0 02 0 00 - 0 61 Thousand/µL    Basophils Absolute 0 01 0 00 - 0 10 Thousands/µL   Protime-INR    Collection Time: 09/22/22 10:27 AM   Result Value Ref Range    Protime 16 5 (H) 11 6 - 14 5 seconds    INR 1 31 (H) 0 84 - 1 19   APTT    Collection Time: 09/22/22 10:27 AM   Result Value Ref Range    PTT 33 23 - 37 seconds   TSH    Collection Time: 09/22/22 10:27 AM   Result Value Ref Range    TSH 3RD GENERATON 1 514 0 450 - 4 500 uIU/mL   Basic metabolic panel    Collection Time: 09/22/22 10:27 AM   Result Value Ref Range    Sodium 142 135 - 147 mmol/L    Potassium 4 7 3 5 - 5 3 mmol/L    Chloride 107 96 - 108 mmol/L    CO2 26 21 - 32 mmol/L    ANION GAP 9 4 - 13 mmol/L    BUN 44 (H) 5 - 25 mg/dL    Creatinine 1 84 (H) 0 60 - 1 30 mg/dL    Glucose 109 65 - 140 mg/dL    Calcium 9 0 8 4 - 10 2 mg/dL    eGFR 35 ml/min/1 73sq m   Magnesium    Collection Time: 09/22/22 10:27 AM   Result Value Ref Range    Magnesium 2 4 1 9 - 2 7 mg/dL   HS Troponin 0hr (reflex protocol)    Collection Time: 09/22/22 10:27 AM   Result Value Ref Range    hs TnI 0hr 50 (H) "Refer to ACS Flowchart"- see link ng/L   B-Type Natriuretic Peptide(BNP) AN, CA, EA Campuses Only    Collection Time: 09/22/22 10:27 AM   Result Value Ref Range    BNP 5,443 (H) 0 - 100 pg/mL   Type and screen    Collection Time: 09/22/22 11:43 AM   Result Value Ref Range    ABO Grouping B     Rh Factor Positive     Antibody Screen Negative     Specimen Expiration Date [id number]    FLU/RSV/COVID - if FLU/RSV clinically relevant    Collection Time: 09/22/22 11:55 AM    Specimen: Nose; Nares   Result Value Ref Range    SARS-CoV-2 Negative Negative    INFLUENZA A PCR Negative Negative    INFLUENZA B PCR Negative Negative    RSV PCR Negative Negative   HS Troponin I 2hr    Collection Time: 09/22/22 12:05 PM   Result Value Ref Range    hs TnI 2hr 48 "Refer to ACS Flowchart"- see link ng/L    Delta 2hr hsTnI -2 <20 ng/L   ABORh Recheck - Contact Blood Bank Prior to Collection    Collection Time: 09/22/22 12:05 PM   Result Value Ref Range    ABO Grouping B     Rh Factor Positive    POCT Blood Gas (CG8+)    Collection Time: 09/22/22 12:14 PM   Result Value Ref Range    pH, Art i-STAT 7 414 7 350 - 7 450    pCO2, Art i-STAT 33 6 (L) 36 0 - 44 0 mm HG    pO2, ART i-STAT 82 0 75 0 - 129 0 mm HG    BE, i-STAT -3 (L) -2 - 3 mmol/L    HCO3, Art i-STAT 21 5 (L) 22 0 - 28 0 mmol/L    CO2, i-STAT 23 21 - 32 mmol/L    O2 Sat, i-STAT 96 (H) 60 - 85 %    SODIUM, I-STAT 141 136 - 145 mmol/l    Potassium, i-STAT 4 2 3 5 - 5 3 mmol/L    Calcium, Ionized i-STAT 1 21 1 12 - 1 32 mmol/L    Hct, i-STAT 24 (L) 36 5 - 49 3 %    Hgb, i-STAT 8 2 (L) 12 0 - 17 0 g/dl    Glucose, i-STAT 119 65 - 140 mg/dl    POC FIO2 33 L    Specimen Type ARTERIAL     Delivery System Nasal Cannula     SITE Right Radial     SERA TEST Positive Allens Test    Prepare Leukoreduced RBC: 1 Units    Collection Time: 09/22/22  1:14 PM   Result Value Ref Range    Unit Product Code M3610J98     Unit Number [id number]     Unit ABO B     Unit RH POS     Crossmatch Compatible     Unit Dispense Status Issued     Unit Product Volume 350 mL       XR chest 1 view portable    Result Date: 9/22/2022  Narrative: CHEST INDICATION:   dyspnea  COMPARISON:  10/22/2018 EXAM PERFORMED/VIEWS:  XR CHEST PORTABLE Images: 2 FINDINGS: There is marked cardiomegaly present, with overall increased size of the heart compared to the prior x-ray from 2018  Some of this is related to semierect portable technique  Scarring within the right lung base with suspected small pleural effusion  Calcified pleural plaque projecting over the right apex  Stable bony structures  Impression: Stable scarring and small effusion within the right lung base  Calcified pleural plaque projects over the right apex  Interval cardiac enlargement when compared with 2018 x-ray, some of which is related to supine portable technique  Workstation performed: [employer] and pertinent reports reviewed  This note has been generated by voice recognition software system  Therefore, there may be spelling, grammar, and or syntax errors  Please contact if questions arise

## 2022-09-22 NOTE — ASSESSMENT & PLAN NOTE
Lab Results   Component Value Date    EGFR 35 09/22/2022    EGFR 41 05/25/2022    EGFR 29 01/19/2022    CREATININE 1 84 (H) 09/22/2022    CREATININE 1 61 (H) 05/25/2022    CREATININE 2 11 (H) 01/19/2022     -Monitoring BMP daily    -Daily magnesium level

## 2022-09-22 NOTE — CONSULTS
Consultation - Cardiology Team One  Audrene Bence 78 y o  male MRN: [id number]  Unit/Bed#: ED-25 Encounter: [id number]    Inpatient consult to Cardiology  Consult performed by: MICHELLE Maurer  Consult ordered by: Garrick Hsu MD      Physician Requesting Consult: Garrick Hsu MD  Reason for Consult / Principal Problem: CHF    Assessment/ Plan    1  Acute hypoxic respiratory failure  On 2LNC, does not wear home O2  In setting of #1 and #2  Wean as able  2  New onset atrial fibrillation with RVR  Presented with worsening SOB x 2-3 weeks  No palpitations, dizziness, or chest pain  ECG showed new onset atrial fibrillation with RVR  Tele reviewed- rates 110s-130s, briefly into the 140s  D/c IV diltiazem- avoid with reduced EF  Give 5 mg IV Lopressor x 1  Start 12 5 mg metoprolol tartrate q6 hours- up titrate as needed  [id number] 6- Anticoagulation with IV heparin, plan to transition to Eliquis 5 mg BID tomorrow/when more stable  Check updated echocardiogram    3  Acute on chronic HFrEF  Suspect precipitated by new onset afib with RVR  BNP 5400, CXR with mild vascular congestion  +rales and LE edema on exam  Extremities warm, wife reports urinating ok at home but does seem a bit more confused than usual although this is difficult to assess given his aphasia  Home diuretic: Lasix 20 mg BID- compliant  Start Lasix 40 mg IV BID   Monitor strict I/Os, daily standing weights, and am BMP    4  NICM, LVEF 25%  Longstanding cardiomyopathy dating back to at least 2014, EF as low as 10% in 2019 but improved to 25% per TTE 09/2021  Etiology unclear- per prior cardiology notes- possible myocarditis +/- HTN  Has previously declined advanced therapies including BiV ICD  GDMT- continue beta blocker, dose being adjusted due to new afib  Hold Entresto for now to allow BP room for rate control  5  Severe COPD- per primary team    6  CKD- Cr 1 8, baseline around 1 5-1 6  recently established with nephrology  Monitor closely with diuresis  7  Prior CVA with residual expressive aphasia- no longer taking statin due to myalgias  On ASA and now starting Eliquis due to new afib  8  Aplastic anemia- follows with heme/onc as outpatient  On Aranesp treatments, most recent 9/19/22  hgb 8 3 which is near his baseline, getting 1 unit PRBCs in ED  Discussed with heme/onc- ok with anticoagulation  History of Present Illness   HPI: Ahsan Ritter is a 78y o  year old male with NICM LVEF 25%, chronic HFrEF, HTN, COPD, prior CVA with residual expressive aphasia, aplastic anemia on aranesp, and CKD  He follows with cardiologist Dr Stephanie Dumont and was last seen in the office in July 2022  His Entresto was decreased due to borderline BPs  He continued to decline further work up or BiV ICD placement  Palliative care had been suggested in 2019 but he had not been ready for that discussion  He presented to the ED with several months of wheezing but more recently 2-3 weeks of worsening dyspnea and his wife also notes leg swelling and a rash on his chest that started a week ago  She was not sure if this was related to his Aranesp treatments  Most of the history is provided by the patient's wife due to his aphasia  He has been trialed on various inhalers and a course of antibiotics by his pulmonologist but she states the wheezing has not improved much  He denies chest pain, palpitations, dizziness, or weight gain  He has been losing weight recently because he has not been eating well  He has chronic orthopnea and has been sleeping in a chair recently because his adjustable bed is broken  He has been compliant with Lasix 20 mg BID and Toprol XL 25 mg daily  He was recently advised to stop taking Entresto due to hypotension and rising creatinine; he stopped this about a week ago  He was found to be in new onset atrial fibrillation with RVR in the ED  His blood pressures are soft  Labs showed: BNP 5443, troponin negative   BUN 44, creatinine 1 84  Hemoglobin 8 3 for which he is already receiving 1 unit PRBCs although his baseline hemoglobin is in the 8s  Flu/covid/RSV testing was negative  He was started on a diltiazem gtt currently running at 12 5 mg/hr around 1100  His rates are in the 110s-130s  Cardiology is consulted for further evaluation and management of his new onset atrial fibrillation  EKG reviewed personally: Afib with RVR, LBBB, PVCs      Telemetry reviewed personally: Atrial fibrillation with RVR, PVCs  Rates in the 110s-130s    Review of Systems   Constitutional: Positive for decreased appetite and weight loss  Negative for diaphoresis and malaise/fatigue  Cardiovascular: Positive for dyspnea on exertion, leg swelling and orthopnea (chronic)  Negative for chest pain, palpitations and syncope  Respiratory: Positive for shortness of breath and wheezing  Negative for cough and sputum production  Gastrointestinal: Negative for bloating, nausea and vomiting  Neurological: Negative for dizziness, light-headedness and weakness  Psychiatric/Behavioral: Negative for altered mental status (wife reports he seems a bit more confused than usual)  All other systems reviewed and are negative      Historical Information   Past Medical History:   Diagnosis Date    Cardiomyopathy [facility])     CHF (congestive heart failure) (HCC)     COPD (chronic obstructive pulmonary disease) (HCC)     Dyspnea on exertion     Essential hypertension     Non-ischemic cardiomyopathy (HCC)      Past Surgical History:   Procedure Laterality Date    IR BIOPSY BONE MARROW  5/26/2021    PILONIDAL CYST EXCISION       Social History     Substance and Sexual Activity   Alcohol Use Not Currently    Alcohol/week: 2 0 standard drinks    Types: 2 Cans of beer per week     Social History     Substance and Sexual Activity   Drug Use No     Social History     Tobacco Use   Smoking Status Former Smoker    Packs/day: 1 00    Years: 63 00    Pack years: 63 00    Types: Cigarettes    Start date: 5    Quit date:     Years since quittin 7   Smokeless Tobacco Never Used     Family History:   Family History   Problem Relation Age of Onset    Breast cancer Mother 55    Hypertension Father     Cancer Father     Cancer Family     Heart disease Maternal Uncle     Coronary artery disease Maternal Uncle     Heart failure Maternal Uncle     Breast cancer Sister 48    Heart attack Neg Hx     Stroke Neg Hx     Anuerysm Neg Hx     Arrhythmia Neg Hx     Clotting disorder Neg Hx     Hyperlipidemia Neg Hx        Meds/Allergies   all current active meds have been reviewed and current meds:   Current Facility-Administered Medications   Medication Dose Route Frequency    furosemide (LASIX) injection 20 mg  20 mg Intravenous Once    nitroglycerin (NITRO-BID) 2 % TD ointment 1 inch  1 inch Topical Once          No Known Allergies    Objective   Vitals: Blood pressure 106/69, pulse (!) 125, temperature 97 5 °F (36 4 °C), temperature source Oral, resp  rate 17, height 5' 8" (1 727 m), weight 68 9 kg (152 lb), SpO2 100 %  , Body mass index is 23 11 kg/m²  ,     Systolic (94ARD), FZA:629 , Min:93 , TKM:456     Diastolic (53TYC), BHY:60, Min:61, Max:73    Intake/Output Summary (Last 24 hours) at 2022 1332  Last data filed at 2022 0905  Gross per 24 hour   Intake 100 ml   Output --   Net 100 ml     Wt Readings from Last 3 Encounters:   22 68 9 kg (152 lb)   22 68 9 kg (152 lb)   22 69 9 kg (154 lb)     Invasive Devices  Report    Peripheral Intravenous Line  Duration           Peripheral IV 22 Left Forearm <1 day    Peripheral IV 22 Right Antecubital <1 day              Physical Exam  Vitals reviewed  Constitutional:       Appearance: He is ill-appearing  Neck:      Vascular: JVD present  No hepatojugular reflux  Cardiovascular:      Rate and Rhythm: Tachycardia present  Rhythm irregularly irregular        Heart sounds: Normal heart sounds  No murmur heard  No friction rub  No gallop  Pulmonary:      Effort: Tachypnea present  No respiratory distress  Breath sounds: Rales (bibasilar, L>R) present  Abdominal:      General: Bowel sounds are normal  There is no distension  Palpations: Abdomen is soft  Tenderness: There is no abdominal tenderness  Musculoskeletal:         General: No tenderness  Normal range of motion  Cervical back: Neck supple  Right lower le+ Pitting Edema present  Left lower le+ Pitting Edema present  Skin:     General: Skin is warm and dry  Capillary Refill: Capillary refill takes 2 to 3 seconds  Findings: No erythema  Neurological:      Mental Status: He is alert  Comments: Expressive aphasia, difficult to assess  Seems mostly oriented, wife reports some confusion today       LABORATORY RESULTS:      CBC with diff:   Results from last 7 days   Lab Units 22  121227 22  1303   WBC Thousand/uL  --  4 34 4 84   HEMOGLOBIN g/dL  --  8 3* 8 9*   I STAT HEMOGLOBIN g/dl 8 2*  --   --    HEMATOCRIT %  --  25 9* 27 6*   HEMATOCRIT, ISTAT % 24*  --   --    MCV fL  --  111* 113*   PLATELETS Thousands/uL  --  58* 72*   MCH pg  --  35 6* 36 5*   MCHC g/dL  --  32 0 32 2   RDW %  --  14 2 14 4   MPV fL  --  11 8 11 3   NRBC AUTO /100 WBCs  --  1 0     CMP:  Results from last 7 days   Lab Units 22  1214 22  1027   POTASSIUM mmol/L  --  4 7   CHLORIDE mmol/L  --  107   CO2 mmol/L  --  26   CO2, I-STAT mmol/L 23  --    BUN mg/dL  --  44*   CREATININE mg/dL  --  1 84*   GLUCOSE, ISTAT mg/dl 119  --    CALCIUM mg/dL  --  9 0   EGFR ml/min/1 73sq m  --  35     BMP:  Results from last 7 days   Lab Units 22  1214 22  1027   POTASSIUM mmol/L  --  4 7   CHLORIDE mmol/L  --  107   CO2 mmol/L  --  26   CO2, I-STAT mmol/L 23  --    BUN mg/dL  --  44*   CREATININE mg/dL  --  1 84*   GLUCOSE, ISTAT mg/dl 119  --    CALCIUM mg/dL  --  9 0     Lab Results   Component Value Date    CREATININE 1 84 (H) 2022    CREATININE 1 61 (H) 2022    CREATININE 2 11 (H) 2022     Results from last 7 days   Lab Units 22  1027   MAGNESIUM mg/dL 2 4     Results from last 7 days   Lab Units 22  1027   TSH 3RD GENERATON uIU/mL 1 514     Results from last 7 days   Lab Units 22  1027   INR  1 31*     Lipid Profile:   Lab Results   Component Value Date    CHOL 190 10/22/2015    CHOL 182 04/15/2015    CHOL 204 2014     Lab Results   Component Value Date    HDL 98 2021    HDL 97 (H) 10/07/2019    HDL 82 (H) 04/10/2019     Lab Results   Component Value Date    LDLCALC 71 2021    LDLCALC 60 10/07/2019    LDLCALC 92 04/10/2019     Lab Results   Component Value Date    TRIG 50 2021    TRIG 61 10/07/2019    TRIG 68 04/10/2019       Cardiac testing:   Results for orders placed during the hospital encounter of 21    Echo complete with contrast if indicated    [place] [address] [address] [place] [address]  [phone]    Transthoracic Echocardiogram  2D, M-mode, Doppler, and Color Doppler    Study date:  03-Sep-2021    Patient: Yamile Martinez  MR number: [id number]  Account number: [de-identified]  : 1945  Age: 78 years  Gender: Male  Status: Outpatient  Location: [facility]  Height: 68 in  Weight: 160 6 lb  BP: 110/ 58 mmHg    Indications: NICM  Diagnoses: I42 8 - Other cardiomyopathies    Sonographer:  LENIN Pacheco  Primary Physician:  Alaina Espinoza DO  Referring Physician:  Tara Field MD  Group:  [facility]  Interpreting Physician:  Skip De León MD    SUMMARY    PROCEDURE INFORMATION:  This was a technically difficult study  LEFT VENTRICLE:  The ventricle was moderately dilated  Systolic function was severely reduced  Ejection fraction was estimated in the range of 20 % to 25 %    There was severe diffuse hypokinesis with distinct regional wall motion abnormalities  Doppler parameters were consistent with abnormal left ventricular relaxation (grade 1 diastolic dysfunction)  Doppler parameters were consistent with elevated mean left atrial filling pressure  Although there was no diagnostic evidence for apical thrombus, this study is not adequate to completely exclude the possibility  Endocardium poorly visualised  Consider constast ECHO  RIGHT VENTRICLE:  The ventricle was mildly dilated  LEFT ATRIUM:  The atrium was markedly dilated  RIGHT ATRIUM:  The atrium was mildly dilated  MITRAL VALVE:  There was mild regurgitation  AORTIC VALVE:  There was no evidence for stenosis  TRICUSPID VALVE:  There was mild regurgitation  Estimated peak PA pressure was 37 mmHg  PULMONIC VALVE:  There was mild regurgitation  IVC, HEPATIC VEINS:  The inferior vena cava was mildly dilated  Respirophasic changes were normal     HISTORY: PRIOR HISTORY: NICM, COPD, HTN, PATTEN, former smoker, CVA  PROCEDURE: The study was performed in the [facility]  This was a routine study  The transthoracic approach was used  The study included complete 2D imaging, M-mode, complete spectral Doppler, and color Doppler  The  heart rate was 54 bpm, at the start of the study  Images were obtained from the parasternal, apical, subcostal, and suprasternal notch acoustic windows  This was a technically difficult study  LEFT VENTRICLE: The ventricle was moderately dilated  Systolic function was severely reduced  Ejection fraction was estimated in the range of 20 % to 25 %  There was severe diffuse hypokinesis with distinct regional wall motion  abnormalities  Wall thickness was normal  Although there was no diagnostic evidence for apical thrombus, this study is not adequate to completely exclude the possibility  Endocardium poorly visualised  Consider constast ECHO   DOPPLER:  Doppler parameters were consistent with abnormal left ventricular relaxation (grade 1 diastolic dysfunction)  Doppler parameters were consistent with elevated mean left atrial filling pressure  RIGHT VENTRICLE: The ventricle was mildly dilated  Systolic function was normal  Wall thickness was normal     LEFT ATRIUM: The atrium was markedly dilated  RIGHT ATRIUM: The atrium was mildly dilated  MITRAL VALVE: Valve structure was normal  There was normal leaflet separation  DOPPLER: The transmitral velocity was within the normal range  There was no evidence for stenosis  There was mild regurgitation  AORTIC VALVE: The valve was trileaflet  Leaflets exhibited normal thickness and normal cuspal separation  DOPPLER: Transaortic velocity was within the normal range  There was no evidence for stenosis  There was no significant  regurgitation  TRICUSPID VALVE: The valve structure was normal  There was normal leaflet separation  DOPPLER: The transtricuspid velocity was within the normal range  There was no evidence for stenosis  There was mild regurgitation  Pulmonary artery  systolic pressure was mildly increased  Estimated peak PA pressure was 37 mmHg  PULMONIC VALVE: Leaflets exhibited normal thickness, no calcification, and normal cuspal separation  DOPPLER: The transpulmonic velocity was within the normal range  There was mild regurgitation  PERICARDIUM: There was no pericardial effusion  The pericardium was normal in appearance  AORTA: The root exhibited normal size  SYSTEMIC VEINS: IVC: The inferior vena cava was mildly dilated   Respirophasic changes were normal     SYSTEM MEASUREMENT TABLES    2D  %FS: 4 88 %  Ao Diam: 3 76 cm  EDV(Teich): 240 43 ml  EF Biplane: 31 46 %  EF(Teich): 10 72 %  ESV(Teich): 214 64 ml  IVSd: 0 87 cm  LA Diam: 4 22 cm  LAAs A2C: 29 06 cm2  LAAs A4C: 36 57 cm2  LAESV A-L A2C: 103 95 ml  LAESV A-L A4C: 177 89 ml  LAESV Index (A-L): 76 ml/m2  LAESV MOD A2C: 99 41 ml  LAESV MOD A4C: 169 92 ml  LAESV(A-L): 141 36 ml  LAESV(MOD BP): 135 01 ml  LAESVInd MOD BP: 72 58 ml/m2  LALs A2C: 6 9 cm  LALs A4C: 6 38 cm  LVEDV MOD A2C: 305 74 ml  LVEDV MOD A4C: 290 04 ml  LVEDV MOD BP: 304 7 ml  LVEDVInd MOD BP: 163 82 ml/m2  LVEF MOD A2C: 31 12 %  LVEF MOD A4C: 29 79 %  LVESV MOD A2C: 210 59 ml  LVESV MOD A4C: 203 63 ml  LVESV MOD BP: 208 84 ml  LVESVInd MOD BP: 112 28 ml/m2  LVIDd: 6 81 cm  LVIDs: 6 48 cm  LVLd A2C: 10 24 cm  LVLd A4C: 9 75 cm  LVLs A2C: 9 15 cm  LVLs A4C: 8 96 cm  LVPWd: 0 63 cm  Leda A4C: 18 01 cm2  RAAs A4C: 22 48 cm2  RAEDV A-L: 58 65 ml  RAEDV MOD: 58 98 ml  RAESV A-L: 76 47 ml  RAESV MOD: 74 55 ml  RALd: 4 7 cm  RALs: 5 61 cm  RVIDd: 4 47 cm  SV MOD A2C: 95 15 ml  SV MOD A4C: 86 41 ml  SV(Teich): 25 79 ml    CW  AV Env  Ti: 327 31 ms  AV MaxP 2 mmHg  AV VTI: 29 72 cm  AV Vmax: 1 34 m/s  AV Vmean: 0 91 m/s  AV meanPG: 3 74 mmHg  PRend P 6 mmHg  PRend Vmax: 1 26 m/s  TR MaxP 89 mmHg  TR Vmax: 2 64 m/s    PW  E' Sept: 0 05 m/s  E/E' Sept: 16 63  LVOT Env  Ti: 338 73 ms  LVOT VTI: 14 7 cm  LVOT Vmax: 0 64 m/s  LVOT Vmean: 0 43 m/s  LVOT maxP 63 mmHg  LVOT meanP 87 mmHg  MV A Anupam: 0 99 m/s  MV Dec Napa: 3 58 m/s2  MV DecT: 232 93 ms  MV E Anupam: 0 83 m/s  MV E/A Ratio: 0 84  MV PHT: 67 55 ms  MVA By PHT: 3 26 cm2  PV Acc Napa: 7 62 m/s2  PV AccT: 87 54 ms    [employer] Accredited Echocardiography Laboratory    Prepared and electronically signed by    Caterina Cottrell MD  Signed 03-Sep-2021 14:37:46    Imaging: I have personally reviewed pertinent reports  and I have personally reviewed pertinent films in PACS  XR chest 1 view portable    Result Date: 2022  Narrative: CHEST INDICATION:   dyspnea  COMPARISON:  10/22/2018 EXAM PERFORMED/VIEWS:  XR CHEST PORTABLE Images: 2 FINDINGS: There is marked cardiomegaly present, with overall increased size of the heart compared to the prior x-ray from 2018  Some of this is related to semierect portable technique   Scarring within the right lung base with suspected small pleural effusion  Calcified pleural plaque projecting over the right apex  Stable bony structures  Impression: Stable scarring and small effusion within the right lung base  Calcified pleural plaque projects over the right apex  Interval cardiac enlargement when compared with 2018 x-ray, some of which is related to supine portable technique  Workstation performed: [id number]     Thank you for allowing us to participate in this patient's care  This pt will follow up with Dr Radha Santoyo once discharged  Counseling / Coordination of Care  Total floor / unit time spent today 45 minutes  Greater than 50% of total time was spent with the patient and / or family counseling and / or coordination of care  A description of the counseling / coordination of care: Review of history, current assessment, development of a plan  Code Status: No Order    ** Please Note: Dragon 360 Dictation voice to text software may have been used in the creation of this document   **

## 2022-09-22 NOTE — ASSESSMENT & PLAN NOTE
Wt Readings from Last 3 Encounters:   09/22/22 68 9 kg (152 lb)   09/16/22 68 9 kg (152 lb)   09/12/22 69 9 kg (154 lb)     Patient's wife says that his typical weight is around 160 lbs  He is having pedal edema today, but it is not as bad as it has been best   Previous echo demonstrated 25% EF     -started furosemide 40 mg IV b i d   -I/O's  -daily weight  -cardiology consulted  -echocardiogram  -cardiovascular diet, sodium restricted 2 g  -Afib treatment as above

## 2022-09-23 PROBLEM — E87.29 HIGH ANION GAP METABOLIC ACIDOSIS: Status: ACTIVE | Noted: 2022-01-01

## 2022-09-23 PROBLEM — E87.2 HIGH ANION GAP METABOLIC ACIDOSIS: Status: ACTIVE | Noted: 2022-01-01

## 2022-09-23 PROBLEM — N17.9 AKI (ACUTE KIDNEY INJURY) (HCC): Status: ACTIVE | Noted: 2022-01-01

## 2022-09-23 NOTE — MALNUTRITION/BMI
This medical record reflects one or more clinical indicators suggestive of malnutrition  Malnutrition Findings:   Adult Malnutrition type: Chronic illness  Adult Degree of Malnutrition: Other severe protein calorie malnutrition  Malnutrition Characteristics: Inadequate energy, Muscle loss, Fat loss      360 Statement: Severe Malnutrition r/t inadequate oral intake in the setting of Chronic Illness ( CHF, Pulmonary emphysema) as evidenced by severe buccal fat wasting, orbital fat wasting, interosseous muscle wasting and intake less than 75% of estimated energy needs > 1 mo  Treatment: Pending GOC  Wife reported that pt would not want artifical nutrition  BMI Findings: Body mass index is 23 11 kg/m²  See Nutrition note dated 9/23 for additional details  Completed nutrition assessment is viewable in the nutrition documentation

## 2022-09-23 NOTE — PROGRESS NOTES
[facility]  Progress Note Yariel Mcgee 1945, 78 y o  male MRN: [id number]  Unit/Bed#: S -01 Encounter: [id number]  Primary Care Provider: Chip Holliday DO   Date and time admitted to hospital: 9/22/2022  8:55 AM    Acute respiratory failure with hypoxia [facility])  Assessment & Plan  This morning with tachypnea, increased accessory muscle usage, on 3 L of oxygen  Likely secondary to CHF exacerbation   ABG does show mild acidosis- component of anion gap metabolic acidosis, NAGMA, respiratory acidosis  Placed on BiPAP  Wean off by BiPAP  Monitor oxygen saturation  Plan as in CHF        MARLENI (acute kidney injury) ([facility] )  Assessment & Plan  Baseline creatinine 1 5-1 point  POA-1 8 trended up to 2 5   Likely secondary to cardiorenal  Nephrology consult  Monitor I/O  Continue Lasix  Renal ultrasound normal  No urinary retention  Monitor BMP    CHF, acute on chronic (HCC)  Assessment & Plan  New echo cardiogram showed EF 25-30%  Lasix 40 IV b i d   Monitor I/O, weight  Cardiology input appreciated          High anion gap metabolic acidosis  Assessment & Plan  Likely secondary to MARLENI and poor perfusion    Aplastic anemia ([facility] )  Assessment & Plan  -heme Onc consulted regarding recent infusion of aranesp(darbepoetin mady), which seemed to correlate with his worsening SOB   Monitor hemoglobin    * A-fib ([facility] )  Assessment & Plan  New onset  Telemetry this morning showed 100 to 110s  Increased metoprolol by Cardiology to 25 mg q 6  On IV heparin drip for chads Vasc 6  Echocardiogram showed EF of 25-30%, severely reduced systolic function, severe global hypokinesis        VTE Pharmacologic Prophylaxis:   VTE Score: 3 Moderate Risk (Score 3-4) - Pharmacological DVT Prophylaxis Ordered: Heparin Drip  Mechanical VTE Prophylaxis in Place: Yes    Patient Centered Rounds: I have performed bedside rounds with nursing staff today      Discussions with Specialists or Other Care Team Provider: Cardiology, ICU, nephrology    Education and Discussions with Family / Patient: Updated  (wife) via phone  Current Length of Stay: 1 day(s)    Current Patient Status: Inpatient     Discharge Plan / Estimated Discharge Date: TBD    Code Status: Level 3 - DNAR and DNI      Subjective:   Patient was confused this morning denies complaints unable to give proper history  No I's and O's documented in the chart  No condom catheter  Patient has urinary incontinent    Objective:     Vitals:   Temp (24hrs), Av °F (36 7 °C), Min:98 °F (36 7 °C), Max:98 °F (36 7 °C)    Temp:  [98 °F (36 7 °C)] 98 °F (36 7 °C)  HR:  [100-124] 100  Resp:  [18-19] 19  BP: ()/(63-82) 119/82  SpO2:  [95 %-99 %] 99 %  Body mass index is 23 11 kg/m²  Input and Output Summary (last 24 hours): Intake/Output Summary (Last 24 hours) at 2022 1714  Last data filed at 2022 1611  Gross per 24 hour   Intake --   Output 400 ml   Net -400 ml       Physical Exam:     Physical Exam  Vitals and nursing note reviewed  Constitutional:       General: He is not in acute distress  Appearance: He is well-developed  He is ill-appearing  HENT:      Head: Normocephalic and atraumatic  Mouth/Throat:      Mouth: Mucous membranes are moist    Eyes:      Conjunctiva/sclera: Conjunctivae normal    Cardiovascular:      Rate and Rhythm: Tachycardia present  Rhythm irregular  Pulses: Normal pulses  Pulmonary:      Effort: Respiratory distress present  Breath sounds: Rales present  Comments: Decreased breath sounds bilaterally  Using accessory muscles  Abdominal:      General: Bowel sounds are normal       Palpations: Abdomen is soft  Tenderness: There is no abdominal tenderness  Musculoskeletal:         General: No deformity  Cervical back: Normal range of motion and neck supple  Right lower leg: No edema  Left lower leg: No edema  Skin:     General: Skin is warm and dry  Capillary Refill: Capillary refill takes 2 to 3 seconds  Neurological:      Mental Status: He is disoriented  Motor: No weakness        Comments: Residual aphasia         Additional Data:     Labs:  Results from last 7 days   Lab Units 09/23/22  0949 09/23/22  0849   WBC Thousand/uL  --  5 13   HEMOGLOBIN g/dL  --  9 2*   I STAT HEMOGLOBIN g/dl 10 2*  --    HEMATOCRIT %  --  28 8*   HEMATOCRIT, ISTAT % 30*  --    PLATELETS Thousands/uL  --  51*   NEUTROS PCT %  --  78*   LYMPHS PCT %  --  11*   MONOS PCT %  --  10   EOS PCT %  --  0     Results from last 7 days   Lab Units 09/23/22  0949 09/23/22  0502   SODIUM mmol/L  --  140   POTASSIUM mmol/L  --  5 1   CHLORIDE mmol/L  --  107   CO2 mmol/L  --  13*   CO2, I-STAT mmol/L 17*  --    BUN mg/dL  --  56*   CREATININE mg/dL  --  2 54*   ANION GAP mmol/L  --  20*   CALCIUM mg/dL  --  8 9   GLUCOSE RANDOM mg/dL  --  152*     Results from last 7 days   Lab Units 09/22/22  1027   INR  1 31*             Results from last 7 days   Lab Units 09/23/22  1126 09/23/22  0849   LACTIC ACID mmol/L 3 4* 4 6*       Imaging: Reviewed radiology reports from this admission including: chest xray    Recent Cultures (last 7 days):           Lines/Drains:  Invasive Devices  Report    Peripheral Intravenous Line  Duration           Peripheral IV 09/22/22 Right Antecubital 1 day    Peripheral IV 09/23/22 Right;Ventral (anterior) Forearm <1 day                Telemetry:   Telemetry Orders (From admission, onward)             48 Hour Telemetry Monitoring  (ED Bridging Orders Panel)  Continuous x 48 hours        References:    Telemetry Guidelines   Question:  Reason for 48 Hour Telemetry  Answer:  Acute Decompensated CHF (continuous diuretic infusion or total diuretic dose > 200 mg daily, associated electrolyte derangement, ionotropic drip, history of ventricular arrhythmia, or new EF <35%)                    Last 24 Hours Medication List:   Current Facility-Administered Medications Medication Dose Route Frequency Provider Last Rate    albuterol  1 puff Inhalation Q4H PRN Alaina Merino MD      aspirin  81 mg Oral Daily Alaina Merino MD      benzonatate  100 mg Oral TID PRN Sav Bustillo MD      finasteride  5 mg Oral Daily Alaina Merino MD      furosemide  40 mg Intravenous Once MICHELLE Pierce      [START ON 9/24/2022] furosemide  80 mg Intravenous BID (diuretic) MICHELLE Pierce      heparin (porcine)  3-20 Units/kg/hr (Order-Specific) Intravenous Titrated Alaina Merino MD 12 Units/kg/hr (09/22/22 1630)    heparin (porcine)  1,950 Units Intravenous Q1H PRN Alaina Merino MD      heparin (porcine)  3,900 Units Intravenous Q1H PRN Alaina Merino MD      metoprolol tartrate  25 mg Oral Q6H MICHELLE Nick      sodium bicarbonate  1,300 mg Oral BID after meals Ira Lechuga MD      umeclidinium bromide  1 puff Inhalation Daily Alaina Merino MD          Today, Patient Was Seen By: Sav Bustillo MD    ** Please Note: This note has been constructed using a voice recognition system   **

## 2022-09-23 NOTE — CASE MANAGEMENT
Case Management Assessment & Discharge Planning Note    Patient name Cait GANDHI /S -01 MRN [id number]  : 1945 Date 2022       Current Admission Date: 2022  Current Admission Diagnosis:A-fib [facility])   Patient Active Problem List    Diagnosis Date Noted    CKD (chronic kidney disease) 2022    A-fib ([facility] ) 2022    Acute respiratory failure with hypoxia ([address] ) 2022    CHF, acute on chronic ([address] ) 2022    Anemia in CKD (chronic kidney disease) 2022    Abnormal CT of the chest 2022    Weight loss 2022    Aplastic anemia ([facility] ) 2022    Chronic left shoulder pain 2022    Unhealthy alcohol drinking behavior 2021    B12 deficiency 2021    Pancytopenia ([facility] ) 2021    Slurred speech 2020    History of stroke 2019    Pulmonary nodule 2019    Aphasia 2019    Dyspnea on exertion 2019    Pulmonary emphysema ([facility] ) 2019    Abnormal CXR 2019    Tobacco abuse in remission 2019    NICM (nonischemic cardiomyopathy) ([facility] ) 2018    Essential hypertension 2018      LOS (days): 1  Geometric Mean LOS (GMLOS) (days): 3 80  Days to GMLOS:2 7     OBJECTIVE:    Risk of Unplanned Readmission Score: 18 17         Current admission status: Inpatient       Preferred Pharmacy:   [facility]  ([address] [facility]) Marie Morales [phone] [place] [address] [place] [address]  Phone: [phone] Fax: [phone]    H[facility]ctor Gillette [address] [address]  Phone: [phone] Fax: [phone]    Primary Care Provider: Ciara Morales DO    Primary Insurance: MEDICARE  Secondary Insurance: AARP    ASSESSMENT:  Kevin Dave Proxies    There are no active Health Care Proxies on file         Patient Information  Admitted from[de-identified] Home  Mental Status: Alert  During Assessment patient was accompanied by: Spouse  Assessment information provided by[de-identified] Spouse  Primary Caregiver: Self  Support Systems: Spouse/significant other  What city do you live in?: [facility] entry access options   Select all that apply : Stairs  Number of steps to enter home : 1  Type of Current Residence: [place]  In the last 12 months, was there a time when you were not able to pay the mortgage or rent on time?: No  In the last 12 months, how many places have you lived?: 1  In the last 12 months, was there a time when you did not have a steady place to sleep or slept in a shelter (including now)?: No  Homeless/housing insecurity resource given?: N/A  Living Arrangements: Lives w/ Spouse/significant other    Activities of Daily Living Prior to Admission  Functional Status: Independent  Completes ADLs independently?: Yes  Ambulates independently?: Yes  Does patient use assisted devices?: No  Does patient currently own DME?: No  Does patient have a history of Outpatient Therapy (PT/OT)?: No  Does the patient have a history of Short-Term Rehab?: No  Does patient have a history of HHC?: No  Does patient currently have [employer] [employer]?: No    Patient Information Continued  Does patient have prescription coverage?: Yes  Within the past 12 months, you worried that your food would run out before you got the money to buy more : Never true  Within the past 12 months, the food you bought just didn't last and you didn't have money to get more : Never true  Food insecurity resource given?: N/A  Does patient receive dialysis treatments?: No  Does patient have a history of substance abuse?: No  Does patient have a history of Mental Health Diagnosis?: No    Means of Transportation  Means of Transport to Appts[de-identified] Family transport  In the past 12 months, has lack of transportation kept you from medical appointments or from getting medications?: No  In the past 12 months, has lack of transportation kept you from meetings, work, or from getting things needed for daily living?: No  Was application for public transport provided?: N/A    DISCHARGE DETAILS:    Contacts  Patient Contacts: Patient and spouse-Fanny  Relationship to Patient[de-identified] Family  Contact Method: In Person  Reason/Outcome: Continuity of Care, Discharge Planning    Other Referral/Resources/Interventions Provided:  Interventions: Other (Specify)  Referral Comments: CM met with pt and spouse at bedside to discuss role of CM and DC planning  Needs are unclear at this time  Per Interdisciplinary rounding, Goals of Care were discussed this morning, wife prefers to keep patient at a Level 3 at this time  IMM Given (Date):: 09/22/22     Additional Comments: CM provided with POA and Living Will information    CM copied and placed in Medical Records bin

## 2022-09-23 NOTE — ASSESSMENT & PLAN NOTE
Baseline creatinine 1 5-1 point  POA-1 8 trended up to 2 5   Likely secondary to cardiorenal  Nephrology consult  Monitor I/O  Continue Lasix  Renal ultrasound normal  No urinary retention  Monitor BMP

## 2022-09-23 NOTE — RESPIRATORY THERAPY NOTE
ABG drawn, results given to Dr Samara Samuel, pt place don bipap for WOB until pt can get fluid off, Pt does have R wrist restraint on but Left is clear

## 2022-09-23 NOTE — CONSULTS
Consultation - Nephrology   Skip Valdivia 78 y o  male MRN: [id number]  Unit/Bed#: S -01 Encounter: [id number]      Assessment/Plan     Assessment:  1  Acute kidney injury superimposed on CKD stage 3  · Baseline 1 5-1 6  · Creatinine on admission 1 8, trend up today 2 5  · Etiology of MARLENI is likely prerenal due to cardiorenal   Also possibly ATN due to hemodynamic changes with component of urinary retention ( bladder scan notable for 350 cc)    2  Anion gap metabolic acidosis:  Likely in the settings of MARLENI and lactic acidosis  3  Acute on chronic HFrEF  · On IV Lasix 40 mg b i d  Per Cardiology    4  New onset atrial fibrillation with RVR  · On Lopressor 25 mg q 6 hours per cardiology    5  Aplastic anemia  · Status post Aranesp infusion  Last dose 9/19    Plan:  1  Continue IV diuresis per Cardiology  2  Recommend Cortes catheter for accurate assessment of I&Os and also due to urinary retention as noted per bladder scan  3  Obtain kidney and bladder ultrasound  4  Add sodium bicarbonate tablets 1300 bid  5  Monitor BMP and renal recovery  6  Avoid hypotension and nephrotoxins including contrast  7  Urinary retention protocol  History of Present Illness   Physician Requesting Consult: Jose Antonio Vu MD  Reason for Consult / Principal Problem:  Acute kidney injury  Hx and PE limited by:  Patient is confused  HPI: Skip Valdivia is a 78y o  year old male with past medical history of CKD stage 3, plastic anemia, COPD, CVA with residual aphasia, nonischemic cardiomyopathy and combined systolic and diastolic heart failure who presents with worsening shortness of breath  He has been evaluated by Cardiology for new onset atrial fibrillation and acute on chronic systolic heart failure  He was started on IV Lasix  Patient laboratory workup was notable for acute kidney injury and metabolic acidosis today a nephrology service was consulted for further management    Patient is confused on and my examination and does not provide reliable history     History obtained from chart review          Inpatient consult to Nephrology     Performed by  Palmer Quick MD     Authorized by Hali Martinez MD              Review of Systems   Unable to perform ROS: Mental status change       Historical Information   Past Medical History:   Diagnosis Date    Cardiomyopathy [facility])     CHF (congestive heart failure) ([facility] )     COPD (chronic obstructive pulmonary disease) ([facility] )     Dyspnea on exertion     Essential hypertension     Non-ischemic cardiomyopathy ([place] [address] )      Past Surgical History:   Procedure Laterality Date    IR BIOPSY BONE MARROW  2021    PILONIDAL CYST EXCISION       Social History   Social History     Substance and Sexual Activity   Alcohol Use Not Currently    Alcohol/week: 2 0 standard drinks    Types: 2 Cans of beer per week     Social History     Substance and Sexual Activity   Drug Use No     E-Cigarette/Vaping    E-Cigarette Use Never User      E-Cigarette/Vaping Substances    Nicotine No     THC No     CBD No     Flavoring No     Other No     Unknown No      Social History     Tobacco Use   Smoking Status Former Smoker    Packs/day: 1 00    Years: 63 00    Pack years: 63 00    Types: Cigarettes    Start date:     Quit date:     Years since quittin 7   Smokeless Tobacco Never Used     Family History   Problem Relation Age of Onset   [facility] Breast cancer Mother 55    Hypertension Father     Cancer Father     Cancer Family     Heart disease Maternal Uncle     Coronary artery disease Maternal Uncle     Heart failure Maternal Uncle     Breast cancer Sister 48    Heart attack Neg Hx     Stroke Neg Hx     Anuerysm Neg Hx     Arrhythmia Neg Hx     Clotting disorder Neg Hx     Hyperlipidemia Neg Hx        Meds/Allergies   all current active meds have been reviewed    Allergies   Allergen Reactions    Aranesp (Albumin Free) [Darbepoetin Sebastian] Other (See Comments) POSSIBLE reaction; Tachycardia, tachypnea, CHF, edema, macular erythematous rash on chest  ED Visit 3 days after 1st dose (9/22/22)  Objective   No intake or output data in the 24 hours ending 09/23/22 1008    Invasive Devices:        Physical Exam  Vitals and nursing note reviewed  Constitutional:       General: He is not in acute distress  Appearance: He is well-developed  He is ill-appearing  HENT:      Head: Normocephalic and atraumatic  Mouth/Throat:      Mouth: Mucous membranes are moist    Eyes:      Conjunctiva/sclera: Conjunctivae normal    Cardiovascular:      Rate and Rhythm: Tachycardia present  Rhythm irregular  Pulses: Normal pulses  Pulmonary:      Effort: No respiratory distress  Breath sounds: Rales present  Comments: Decreased breath sounds bilaterally  Abdominal:      General: Bowel sounds are normal       Palpations: Abdomen is soft  Tenderness: There is no abdominal tenderness  Musculoskeletal:         General: No deformity  Cervical back: Normal range of motion and neck supple  Right lower leg: No edema  Left lower leg: No edema  Skin:     General: Skin is warm and dry  Capillary Refill: Capillary refill takes 2 to 3 seconds  Neurological:      Mental Status: He is disoriented  Motor: No weakness        Comments: Residual aphasia         Current Weight: Weight - Scale: 68 9 kg (152 lb)  First Weight: Weight - Scale: 68 9 kg (152 lb)    Lab Results:    CBC:   Lab Results   Component Value Date    WBC 5 13 09/23/2022    HGB 10 2 (L) 09/23/2022    HCT 30 (L) 09/23/2022     (H) 09/23/2022    PLT 51 (L) 09/23/2022    MCH 34 3 09/23/2022    MCHC 31 9 09/23/2022    RDW 17 9 (H) 09/23/2022    MPV 12 6 09/23/2022    NRBC 2 09/23/2022     CMP:   Lab Results   Component Value Date    SODIUM 140 09/23/2022    K 5 1 09/23/2022     09/23/2022    CO2 17 (L) 09/23/2022    BUN 56 (H) 09/23/2022    CREATININE 2 54 (H) 09/23/2022    GLUCOSE 133 09/23/2022    CALCIUM 8 9 09/23/2022    EGFR 23 09/23/2022     Nutrition Assessment and Intervention:     Reviewed food recall journal      Physical Activity Assessment and Intervention:    Activity journal completion recommended    Exercise capacity assessment recommended      Emotional and Mental Well-being, Sleep, Connectedness Assessment and Intervention:    Sleep/stress assessment performed    Depression and anxiety screening performed and reviewed    PHQ-9 or GAD7 performed for initial evaluation or follow-up      Tobacco and Toxic Substance Assessment and Intervention:     Tobacco use screening performed    Alcohol and drug use screening performed      Therapeutic Lifestyle Change Visit:     One-on-one comprehensive counseling, coaching, and health behavior change visit completed        EKG, Pathology, and Other Studies:  Reviewed    Counseling / Coordination of Care  Total floor / unit time spent today 63 minutes  Greater than 50% of total time was spent with the patient and / or family counseling and / or coordination of care

## 2022-09-23 NOTE — PROGRESS NOTES
General Cardiology   Progress Note -  Team One   Kathy Bradshaw 78 y o  male MRN: [id number]  Unit/Bed#: S -01 Encounter: [id number]    Assessment/ Plan    1  Acute hypoxic respiratory failure  On 2LNC, does not wear home O2  In setting of #1 and #2  Wean as able      2  New onset atrial fibrillation with RVR  Presented with worsening SOB x 2-3 weeks  No palpitations, dizziness, or chest pain  ECG showed new onset atrial fibrillation with RVR  Tele reviewed- rates predominantly 100-110s but intermittently faster  Started 12 5 mg metoprolol tartrate q6 hours yesterday with improvement but still sub-optimally controlled  Increase metoprolol to 25 mg q6 hours  [id number] 6- Anticoagulation with IV heparin, plan to transition to Eliquis 5 mg BID before discharge  Check updated echocardiogram     3  Acute on chronic HFrEF  Suspect precipitated by new onset afib with RVR  BNP 5400, CXR with mild vascular congestion  Increased work of breathing this morning and continued evidence of volume overload on exam but remains warm and well-perfused  Home diuretic: Lasix 20 mg BID- compliant  Was supposed to receive Lasix 40 mg IV last night but did not  Unclear as to why  Received 1st dose of IV Lasix this morning  Continue 40 mg IV Lasix b i d   Monitor strict I/Os, daily standing weights, and am BMP     4  NICM, LVEF 25%  Longstanding cardiomyopathy dating back to at least 2014, EF as low as 10% in 2019 but improved to 25% per TTE 09/2021  Etiology unclear- per prior cardiology notes- possible myocarditis +/- HTN  Has previously declined advanced therapies including BiV ICD  GDMT- continue beta blocker, dose being adjusted due to new afib  Hold Entresto for now to allow BP room for rate control as well as due to MARLENI      5  Severe COPD- per primary team     6  MARLENI on CKD- Cr up to 2 5 today, baseline around 1 5-1 6  recently established with nephrology  Suspect this is in setting of volume overload   He is also retaining >350 mL of urine at time of my exam      7  Prior CVA with residual expressive aphasia- no longer taking statin due to myalgias  On ASA and now starting Eliquis due to new afib      8  Aplastic anemia- follows with heme/onc as outpatient  On Aranesp treatments, most recent 9/19/22  hgb 8 3 yesterday which is near his baseline, s/p 1 unit PRBCs in ED  Hgb now 9 2  Discussed with heme/onc- ok with anticoagulation  Subjective  Review of Systems   Constitutional: Positive for decreased appetite, malaise/fatigue and weight loss  Negative for chills  Cardiovascular: Positive for dyspnea on exertion  Negative for chest pain, leg swelling, orthopnea, palpitations and syncope  Respiratory: Positive for shortness of breath  Negative for cough, sleep disturbances due to breathing and sputum production  Hematologic/Lymphatic: Negative for bleeding problem  Gastrointestinal: Negative for bloating, nausea and vomiting  Neurological: Negative for dizziness and light-headedness  All other systems reviewed and are negative  Objective:   Vitals: Blood pressure 119/82, pulse (!) 124, temperature 98 °F (36 7 °C), temperature source Oral, resp  rate 19, height 5' 8" (1 727 m), weight 68 9 kg (152 lb), SpO2 99 %  , Body mass index is 23 11 kg/m²  ,     Systolic (15XWP), QZE:593 , Min:93 , JUK:305     Diastolic (06NAR), GYP:50, Min:59, Max:82    No intake or output data in the 24 hours ending 09/23/22 0936  Wt Readings from Last 3 Encounters:   09/22/22 68 9 kg (152 lb)   09/16/22 68 9 kg (152 lb)   09/12/22 69 9 kg (154 lb)     Telemetry Review: Afib, rates 100-130s  Occasional PVCs    Physical Exam  Vitals reviewed  Constitutional:       Appearance: He is ill-appearing  Neck:      Vascular: No hepatojugular reflux or JVD  Cardiovascular:      Rate and Rhythm: Tachycardia present  Rhythm irregularly irregular  Heart sounds: Heart sounds are distant  No murmur heard  No friction rub  No gallop  Comments: Trace B/L LE edema  Pulmonary:      Effort: Tachypnea present  Breath sounds: Rales (bibasilar) present  Comments: On BiPAP  Abdominal:      General: Bowel sounds are normal  There is no distension  Palpations: Abdomen is soft  Tenderness: There is no abdominal tenderness  Musculoskeletal:         General: No tenderness  Normal range of motion  Cervical back: Neck supple  Skin:     General: Skin is warm and dry  Capillary Refill: Capillary refill takes 2 to 3 seconds  Findings: No erythema  Neurological:      Mental Status: He is alert  Comments: Expressive aphasia  Appears to be at baseline     Psychiatric:         Mood and Affect: Mood normal      LABORATORY RESULTS      CBC with diff:   Results from last 7 days   Lab Units 09/23/22  0849 09/22/22  1214 09/22/22  1027 09/16/22  1303   WBC Thousand/uL 5 13  --  4 34 4 84   HEMOGLOBIN g/dL 9 2*  --  8 3* 8 9*   I STAT HEMOGLOBIN g/dl  --  8 2*  --   --    HEMATOCRIT % 28 8*  --  25 9* 27 6*   HEMATOCRIT, ISTAT %  --  24*  --   --    MCV fL 108*  --  111* 113*   PLATELETS Thousands/uL 51*  --  58* 72*   MCH pg 34 3  --  35 6* 36 5*   MCHC g/dL 31 9  --  32 0 32 2   RDW % 17 9*  --  14 2 14 4   MPV fL 12 6  --  11 8 11 3   NRBC AUTO /100 WBCs  --   --  1 0       CMP:  Results from last 7 days   Lab Units 09/23/22  0502 09/22/22  1214 09/22/22  1027   POTASSIUM mmol/L 5 1  --  4 7   CHLORIDE mmol/L 107  --  107   CO2 mmol/L 13*  --  26   CO2, I-STAT mmol/L  --  23  --    BUN mg/dL 56*  --  44*   CREATININE mg/dL 2 54*  --  1 84*   GLUCOSE, ISTAT mg/dl  --  119  --    CALCIUM mg/dL 8 9  --  9 0   EGFR ml/min/1 73sq m 23  --  35       BMP:  Results from last 7 days   Lab Units 09/23/22  0502 09/22/22  1214 09/22/22  1027   POTASSIUM mmol/L 5 1  --  4 7   CHLORIDE mmol/L 107  --  107   CO2 mmol/L 13*  --  26   CO2, I-STAT mmol/L  --  23  --    BUN mg/dL 56*  --  44*   CREATININE mg/dL 2 54*  --  1 84*   GLUCOSE, ISTAT mg/dl  --  119  --    CALCIUM mg/dL 8 9  --  9 0       Lab Results   Component Value Date    CREATININE 2 54 (H) 2022    CREATININE 1 84 (H) 2022    CREATININE 1 61 (H) 2022       No results found for: NTBNP        Results from last 7 days   Lab Units 22  0502 22  1027   MAGNESIUM mg/dL 2 7 2 4                 Results from last 7 days   Lab Units 22  1027   TSH 3RD GENERATON uIU/mL 1 514       Results from last 7 days   Lab Units 22  1027   INR  1 31*       Lipid Profile:   Lab Results   Component Value Date    CHOL 190 10/22/2015    CHOL 182 04/15/2015    CHOL 204 2014     Lab Results   Component Value Date    HDL 98 2021    HDL 97 (H) 10/07/2019    HDL 82 (H) 04/10/2019     Lab Results   Component Value Date    LDLCALC 71 2021    LDLCALC 60 10/07/2019    LDLCALC 92 04/10/2019     Lab Results   Component Value Date    TRIG 50 2021    TRIG 61 10/07/2019    TRIG 68 04/10/2019       Cardiac testing:   Results for orders placed during the hospital encounter of 21    Echo complete with contrast if indicated    Narrative  [address] [place] [address], [address] [place] [address]  [phone]    Transthoracic Echocardiogram  2D, M-mode, Doppler, and Color Doppler    Study date:  03-Sep-2021    Patient: Landon Carbajal  MR number: [id number]  Account number: [de-identified]  : 1945  Age: 78 years  Gender: Male  Status: Outpatient  Location: [address] [place] [address] [facility]  Height: 68 in  Weight: 160 6 lb  BP: 110/ 58 mmHg    Indications: NICM  Diagnoses: I42 8 - Other cardiomyopathies    Sonographer:  LENIN Stark  Primary Physician:  Izabela Crespo DO  Referring Physician:  Musa Padilla MD  Group:  [facility]  Interpreting Physician:  Nga Medina MD    SUMMARY    PROCEDURE INFORMATION:  This was a technically difficult study  LEFT VENTRICLE:  The ventricle was moderately dilated    Systolic function was severely reduced  Ejection fraction was estimated in the range of 20 % to 25 %  There was severe diffuse hypokinesis with distinct regional wall motion abnormalities  Doppler parameters were consistent with abnormal left ventricular relaxation (grade 1 diastolic dysfunction)  Doppler parameters were consistent with elevated mean left atrial filling pressure  Although there was no diagnostic evidence for apical thrombus, this study is not adequate to completely exclude the possibility  Endocardium poorly visualised  Consider constast ECHO  RIGHT VENTRICLE:  The ventricle was mildly dilated  LEFT ATRIUM:  The atrium was markedly dilated  RIGHT ATRIUM:  The atrium was mildly dilated  MITRAL VALVE:  There was mild regurgitation  AORTIC VALVE:  There was no evidence for stenosis  TRICUSPID VALVE:  There was mild regurgitation  Estimated peak PA pressure was 37 mmHg  PULMONIC VALVE:  There was mild regurgitation  IVC, HEPATIC VEINS:  The inferior vena cava was mildly dilated  Respirophasic changes were normal     HISTORY: PRIOR HISTORY: NICM, COPD, HTN, PATTEN, former smoker, CVA  PROCEDURE: The study was performed in the [facility]  This was a routine study  The transthoracic approach was used  The study included complete 2D imaging, M-mode, complete spectral Doppler, and color Doppler  The  heart rate was 54 bpm, at the start of the study  Images were obtained from the parasternal, apical, subcostal, and suprasternal notch acoustic windows  This was a technically difficult study  LEFT VENTRICLE: The ventricle was moderately dilated  Systolic function was severely reduced  Ejection fraction was estimated in the range of 20 % to 25 %  There was severe diffuse hypokinesis with distinct regional wall motion  abnormalities   Wall thickness was normal  Although there was no diagnostic evidence for apical thrombus, this study is not adequate to completely exclude the possibility  Endocardium poorly visualised  Consider constast ECHO  DOPPLER:  Doppler parameters were consistent with abnormal left ventricular relaxation (grade 1 diastolic dysfunction)  Doppler parameters were consistent with elevated mean left atrial filling pressure  RIGHT VENTRICLE: The ventricle was mildly dilated  Systolic function was normal  Wall thickness was normal     LEFT ATRIUM: The atrium was markedly dilated  RIGHT ATRIUM: The atrium was mildly dilated  MITRAL VALVE: Valve structure was normal  There was normal leaflet separation  DOPPLER: The transmitral velocity was within the normal range  There was no evidence for stenosis  There was mild regurgitation  AORTIC VALVE: The valve was trileaflet  Leaflets exhibited normal thickness and normal cuspal separation  DOPPLER: Transaortic velocity was within the normal range  There was no evidence for stenosis  There was no significant  regurgitation  TRICUSPID VALVE: The valve structure was normal  There was normal leaflet separation  DOPPLER: The transtricuspid velocity was within the normal range  There was no evidence for stenosis  There was mild regurgitation  Pulmonary artery  systolic pressure was mildly increased  Estimated peak PA pressure was 37 mmHg  PULMONIC VALVE: Leaflets exhibited normal thickness, no calcification, and normal cuspal separation  DOPPLER: The transpulmonic velocity was within the normal range  There was mild regurgitation  PERICARDIUM: There was no pericardial effusion  The pericardium was normal in appearance  AORTA: The root exhibited normal size  SYSTEMIC VEINS: IVC: The inferior vena cava was mildly dilated   Respirophasic changes were normal     SYSTEM MEASUREMENT TABLES    2D  %FS: 4 88 %  Ao Diam: 3 76 cm  EDV(Teich): 240 43 ml  EF Biplane: 31 46 %  EF(Teich): 10 72 %  ESV(Teich): 214 64 ml  IVSd: 0 87 cm  LA Diam: 4 22 cm  LAAs A2C: 29 06 cm2  LAAs A4C: 36 57 cm2  LAESV A-L A2C: 103 95 ml  LAESV A-L A4C: 177 89 ml  LAESV Index (A-L): 76 ml/m2  LAESV MOD A2C: 99 41 ml  LAESV MOD A4C: 169 92 ml  LAESV(A-L): 141 36 ml  LAESV(MOD BP): 135 01 ml  LAESVInd MOD BP: 72 58 ml/m2  LALs A2C: 6 9 cm  LALs A4C: 6 38 cm  LVEDV MOD A2C: 305 74 ml  LVEDV MOD A4C: 290 04 ml  LVEDV MOD BP: 304 7 ml  LVEDVInd MOD BP: 163 82 ml/m2  LVEF MOD A2C: 31 12 %  LVEF MOD A4C: 29 79 %  LVESV MOD A2C: 210 59 ml  LVESV MOD A4C: 203 63 ml  LVESV MOD BP: 208 84 ml  LVESVInd MOD BP: 112 28 ml/m2  LVIDd: 6 81 cm  LVIDs: 6 48 cm  LVLd A2C: 10 24 cm  LVLd A4C: 9 75 cm  LVLs A2C: 9 15 cm  LVLs A4C: 8 96 cm  LVPWd: 0 63 cm  Leda A4C: 18 01 cm2  RAAs A4C: 22 48 cm2  RAEDV A-L: 58 65 ml  RAEDV MOD: 58 98 ml  RAESV A-L: 76 47 ml  RAESV MOD: 74 55 ml  RALd: 4 7 cm  RALs: 5 61 cm  RVIDd: 4 47 cm  SV MOD A2C: 95 15 ml  SV MOD A4C: 86 41 ml  SV(Teich): 25 79 ml    CW  AV Env  Ti: 327 31 ms  AV MaxP 2 mmHg  AV VTI: 29 72 cm  AV Vmax: 1 34 m/s  AV Vmean: 0 91 m/s  AV meanPG: 3 74 mmHg  PRend P 6 mmHg  PRend Vmax: 1 26 m/s  TR MaxP 89 mmHg  TR Vmax: 2 64 m/s    PW  E' Sept: 0 05 m/s  E/E' Sept: 16 63  LVOT Env  Ti: 338 73 ms  LVOT VTI: 14 7 cm  LVOT Vmax: 0 64 m/s  LVOT Vmean: 0 43 m/s  LVOT maxP 63 mmHg  LVOT meanP 87 mmHg  MV A Anupam: 0 99 m/s  MV Dec [place]: 3 58 m/s2  MV DecT: 232 93 ms  MV E Anupam: 0 83 m/s  MV E/A Ratio: 0 84  MV PHT: 67 55 ms  MVA By PHT: 3 26 cm2  PV Acc [place]: 7 62 m/s2  PV AccT: 87 54 ms    [employer] Accredited Echocardiography Laboratory    Prepared and electronically signed by    Graciela Tate MD  Signed 03-Sep-2021 14:37:46    Meds/Allergies   all current active meds have been reviewed and current meds:   Current Facility-Administered Medications   Medication Dose Route Frequency    albuterol (PROVENTIL HFA,VENTOLIN HFA) inhaler 1 puff  1 puff Inhalation Q4H PRN    aspirin (ECOTRIN LOW STRENGTH) EC tablet 81 mg  81 mg Oral Daily    benzonatate (TESSALON PERLES) capsule 100 mg 100 mg Oral TID PRN    finasteride (PROSCAR) tablet 5 mg  5 mg Oral Daily    furosemide (LASIX) injection 40 mg  40 mg Intravenous BID (diuretic)    heparin (porcine) 25,000 units in 0 45% NaCl 250 mL infusion (premix)  3-20 Units/kg/hr (Order-Specific) Intravenous Titrated    heparin (porcine) injection 1,950 Units  1,950 Units Intravenous Q1H PRN    heparin (porcine) injection 3,900 Units  3,900 Units Intravenous Q1H PRN    metoprolol tartrate (LOPRESSOR) partial tablet 12 5 mg  12 5 mg Oral Q6H    umeclidinium bromide (INCRUSE ELLIPTA) 62 5 mcg/inh inhaler AEPB 1 puff  1 puff Inhalation Daily     Medications Prior to Admission   Medication    aspirin (ECOTRIN LOW STRENGTH) 81 mg EC tablet    finasteride (PROSCAR) 5 mg tablet    furosemide (LASIX) 20 mg tablet    metoprolol succinate (TOPROL-XL) 25 mg 24 hr tablet    umeclidinium bromide (Incruse Ellipta) 62 5 mcg/inh AEPB inhaler    umeclidinium bromide (Incruse Ellipta) 62 5 mcg/inh AEPB inhaler    Ventolin  (90 Base) MCG/ACT inhaler    predniSONE 20 mg tablet    sacubitril-valsartan (Entresto) 49-51 MG TABS     heparin (porcine), 3-20 Units/kg/hr (Order-Specific), Last Rate: 12 Units/kg/hr (09/22/22 1630)    Counseling / Coordination of Care  Total floor / unit time spent today 20 minutes  Greater than 50% of total time was spent with the patient and / or family counseling and / or coordination of care  ** Please Note: Dragon 360 Dictation voice to text software may have been used in the creation of this document   **

## 2022-09-23 NOTE — ASSESSMENT & PLAN NOTE
This morning with tachypnea, increased accessory muscle usage, on 3 L of oxygen  Likely secondary to CHF exacerbation   ABG does show mild acidosis- component of anion gap metabolic acidosis, NAGMA, respiratory acidosis  Placed on BiPAP  Wean off by BiPAP  Monitor oxygen saturation  Plan as in CHF

## 2022-09-23 NOTE — RESPIRATORY THERAPY NOTE
Pt transported [facility] on bipap, pt arrived to room no issues occurred pt remained on bipap on documented settings        09/23/22 1637   Non-Invasive Information   O2 Interface Device Full face mask   Non-Invasive Ventilation Mode BiPAP   SpO2 99 %   $ Pulse Oximetry Spot Check Charge Completed   Non-Invasive Settings   IPAP (cm) 14 cm   EPAP (cm) 6 cm   Rate (Set) 14   FiO2 (%) 21   Rise Time 2   Inspiratory Time (Set) 0 9   Non-Invasive Readings   Skin Intervention Skin intact   Total Rate 34   MV (Mech) 30 5   Peak Pressure (Obs) 16   Spontaneous Vt (mL) 903   Leak (lpm) 28   Non-Invasive Alarms   Insp Pressure High (cm H20) 20   Insp Pressure Low (cm H20) 10   MV Low (L/min) 3   Vt High (mL) 1500   Vt Low (mL) 200   High Resp Rate (BPM) 40 BPM   Low Resp Rate (BPM) 8 BPM   Apnea Interval (sec) 20

## 2022-09-23 NOTE — ASSESSMENT & PLAN NOTE
New onset  Telemetry this morning showed 100 to 110s  Increased metoprolol by Cardiology to 25 mg q 6  On IV heparin drip for chads Vasc 6  Echocardiogram showed EF of 25-30%, severely reduced systolic function, severe global hypokinesis

## 2022-09-23 NOTE — ASSESSMENT & PLAN NOTE
-heme Onc consulted regarding recent infusion of aranesp(darbepoetin mady), which seemed to correlate with his worsening SOB   Monitor hemoglobin

## 2022-09-23 NOTE — PLAN OF CARE
Problem: MOBILITY - ADULT  Goal: Maintain or return to baseline ADL function  Description: INTERVENTIONS:  -  Assess patient's ability to carry out ADLs; assess patient's baseline for ADL function and identify physical deficits which impact ability to perform ADLs (bathing, care of mouth/teeth, toileting, grooming, dressing, etc )  - Assess/evaluate cause of self-care deficits   - Assess range of motion  - Assess patient's mobility; develop plan if impaired  - Assess patient's need for assistive devices and provide as appropriate  - Encourage maximum independence but intervene and supervise when necessary  - Involve family in performance of ADLs  - Assess for home care needs following discharge   - Consider OT consult to assist with ADL evaluation and planning for discharge  - Provide patient education as appropriate  Outcome: Progressing  Goal: Maintains/Returns to pre admission functional level  Description: INTERVENTIONS:  - Perform BMAT or MOVE assessment daily    - Set and communicate daily mobility goal to care team and patient/family/caregiver  - Collaborate with rehabilitation services on mobility goals if consulted  - Perform Range of Motion  times a day  - Reposition patient every  hours  - Dangle patient  times a day  - Stand patient  times a day  - Ambulate patient  times a day  - Out of bed to chair  times a day   - Out of bed for meals imes a day  - Out of bed for toileting  - Record patient progress and toleration of activity level   Outcome: Progressing     Problem: Nutrition/Hydration-ADULT  Goal: Nutrient/Hydration intake appropriate for improving, restoring or maintaining nutritional needs  Description: Monitor and assess patient's nutrition/hydration status for malnutrition  Collaborate with interdisciplinary team and initiate plan and interventions as ordered  Monitor patient's weight and dietary intake as ordered or per policy  Utilize nutrition screening tool and intervene as necessary  Determine patient's food preferences and provide high-protein, high-caloric foods as appropriate       INTERVENTIONS:  - Monitor oral intake, urinary output, labs, and treatment plans  - Assess nutrition and hydration status and recommend course of action  - Evaluate amount of meals eaten  - Assist patient with eating if necessary   - Allow adequate time for meals  - Recommend/ encourage appropriate diets, oral nutritional supplements, and vitamin/mineral supplements  - Order, calculate, and assess calorie counts as needed  - Recommend, monitor, and adjust tube feedings and TPN/PPN based on assessed needs  - Assess need for intravenous fluids  - Provide specific nutrition/hydration education as appropriate  - Include patient/family/caregiver in decisions related to nutrition  Outcome: Progressing     Problem: Prexisting or High Potential for Compromised Skin Integrity  Goal: Skin integrity is maintained or improved  Description: INTERVENTIONS:  - Identify patients at risk for skin breakdown  - Assess and monitor skin integrity  - Assess and monitor nutrition and hydration status  - Monitor labs   - Assess for incontinence   - Turn and reposition patient  - Assist with mobility/ambulation  - Relieve pressure over bony prominences  - Avoid friction and shearing  - Provide appropriate hygiene as needed including keeping skin clean and dry  - Evaluate need for skin moisturizer/barrier cream  - Collaborate with interdisciplinary team   - Patient/family teaching  - Consider wound care consult   Outcome: Progressing

## 2022-09-23 NOTE — ASSESSMENT & PLAN NOTE
New echo cardiogram showed EF 25-30%  Lasix 40 IV b i d   Monitor I/O, weight  Cardiology input appreciated

## 2022-09-24 NOTE — QUICK NOTE
Patient continues to have uptrending Lactic acid  Now at 7  Saw the patient and he has cold extremities  Appears more lethargic  BP still stable as of now  Previous urine cath shows 400 ml output only  Received one dose of lasix  Will get another bladder scan  EF was 30%, Patient had mild vascular cogestion, BNP 5k  Poor recorder urine output  Vitals:    09/23/22 1943 09/23/22 2115 09/23/22 2304 09/23/22 2333   BP: 99/68 109/81 114/65 118/70   BP Location:       Pulse: (!) 125 (!) 120 73 (!) 124   Resp:       Temp: 98 °F (36 7 °C)  (!) 97 3 °F (36 3 °C) 98 5 °F (36 9 °C)   TempSrc:   Axillary    SpO2: 100% 99% 97% 91%   Weight:       Height:       Most probably going through cardiogenic shock  Reached out to critical care and they evaluated the patient  Called cardiology  Will get stat VBG, bmp, lactic acid repeat  Will likely need milrinone

## 2022-09-24 NOTE — TREATMENT PLAN
Nephrology    Critical care team had an at length discussion with the patient's family member due to the patient's worsening clinical course  Wife is now electing to transition to comfort care measures  Comfort care order was placed    Nothing further to add from a standpoint call for any questions or concerns    Thank you

## 2022-09-24 NOTE — QUICK NOTE
Patient appears stable  Was successfully weaned off of Bipap and was saturating at >90 on room air  No longer agitated  HR still not controlled  Patient was  On chart review patient has LVEF 25-30  With mild pleural eff  Neuro and cardiology onboard    Vitals:    09/23/22 1434 09/23/22 1637 09/23/22 1648 09/23/22 1943   BP:    99/68   BP Location:       Pulse:    (!) 125   Resp:       Temp:    98 °F (36 7 °C)   TempSrc:       SpO2: 99% 99% 96% 100%   Weight:       Height:       LA trending upward  Suspicion for infection low  Probably from RVR  Patient was unable to take metoprolol dose due to bipap  Will resume now  Will aim to control heart rates, and continue to monitor throughout the night

## 2022-09-24 NOTE — QUICK NOTE
Called to evaluate patient for persistently up trending lactic, afib with RVR  Pt appears unresponsive (level 3), with cold extremities  Given previous remarks by patient that he would not want further treatment or interventions, discussed with his wife who was In agreement that patient would not want any further interventions including central line, and milrinone  She is electing to trantision patient to comfort measures only, knowing that stopping current treatment and withholding further treatment, he will pass away  All questions answered  Orders placed

## 2022-09-24 NOTE — PROGRESS NOTES
Progress Note - Clari Castillo 78 y o  male MRN: [id number]    Unit/Bed#: S -01 Encounter: [id number]      Assessment:  Acute on chronic congestive heart failure with systolic and diastolic dysfunction  Cardiorenal syndrome  New onset atrial fibrillation with RVR  Acute hypoxic respiratory failure secondary to above  Nonischemic cardiomyopathy with EF of 25%  Severe COPD  Anemia of chronic disease  Acute metabolic encephalopathy    Plan:  Patient has multiple comorbid conditions as mentioned above, in the night patient became hypotensive, cyanotic, cold peripheries  Night team including critical care team discussed with patient's wife who wanted him to be comfortable and transition him to comfort care  I have consulted hospice evaluation  Discussed with the patient's wife over the phone answered all the questions    Discussed with the nursing staff    Continue comfort medications morphine and Ativan as needed    Subjective:   Patient not arousable, does not appear to be in any distress  Discussed with the nursing staff  Blood pressure on the lower side  Objective:     Vitals: Blood pressure (!) 83/59, pulse (!) 124, temperature 97 8 °F (36 6 °C), temperature source Axillary, resp  rate 16, height 5' 8" (1 727 m), weight 68 9 kg (152 lb), SpO2 91 %  ,Body mass index is 23 11 kg/m²        Intake/Output Summary (Last 24 hours) at 9/24/2022 1104  Last data filed at 9/24/2022 0135  Gross per 24 hour   Intake --   Output 405 ml   Net -405 ml       Physical Exam:  General appearance:  Patient not in acute distress  Eyes:  Pupils equal reacting to light  CVS:  S1-S2 heard, irregular, 1+ pitting edema bilateral lower extremity  Chest:  Bilateral air entry present, clear to auscultation anteriorly  Abdomen:  Soft, nontender  CNS:  Currently not responding to verbal, painful stimulus  Genitourinary: deferred  Skin:  No acute rash   Musculoskeletal:  No joint deformities     Invasive Devices  Report Peripheral Intravenous Line  Duration           Peripheral IV 09/23/22 Left;Upper Arm <1 day          Drain  Duration           Urethral Catheter Double-lumen 16 Fr  <1 day                Lab, Imaging and other studies: I have personally reviewed pertinent reports      VTE Pharmacologic Prophylaxis: Reason for no pharmacologic prophylaxis Currently on comfort care  VTE Mechanical Prophylaxis: reason for no mechanical VTE prophylaxis Currently patient on comfort care

## 2022-09-25 NOTE — DEATH NOTE
INPATIENT DEATH NOTE  Audrene Bence 78 y o  male MRN: [id number]  Unit/Bed#: S -01 Encounter: [id number]         Patient's Information  Pronounced by: Lynda Hawkins  Did the patient's death occur in the ED?: No  Did the patient's death occur in the OR?: No  Did the patient's death occur less than 10 days post-op?: No  Did the patient's death occur within 24 hours of admission?: No    PHYSICAL EXAM:  Unresponsive to noxious stimuli, Spontaneous respirations absent, Breath sounds absent, Carotid pulse absent, Heart sounds absent, Pupillary light reflex absent and Corneal blink reflex absent    Medical Examiner notification criteria:  NONE APPLICABLE   Medical Examiner's office notified?:  No, does not meet ME notification criteria   Medical Examiner accepted case?:  No  Name of Medical Examiner: N/A    Family Notification  Was the family notified?: Yes  Date Notified: 22  Time Notified: 2100  Notified by: Lynda Hawkins  Name of Family Notified of Death: Emmy Aguilar   Relationship to Patient: Spouse  Family Notification Route: Telephone  Was the family told to contact a  home?: Yes  Name of [facility][de-identified] [facility]     Autopsy Options:  Post-mortem examination authorized by next of kin  Consent form completed  However, consent was made via phone  May require in-person signature for consent  Day team to address as patient's family not at bedside  Autopsy order placed in [address] [place] [address] and Contacted [phone] at [facility] to notify       Primary Service Attending Physician notified?:  No     home: Helen's  home     Physician/Resident responsible for completing Discharge Summary:  Dr Clarence Navarrete

## 2022-09-26 ENCOUNTER — EPISODE CHANGES (OUTPATIENT)
Dept: CASE MANAGEMENT | Facility: OTHER | Age: 77
End: 2022-09-26

## 2022-09-27 NOTE — DISCHARGE SUMMARY
Discharge Summary - Carin Conroy 78 y o  male MRN: [id number]    Unit/Bed#: S -01 Encounter: [id number]    Admission Date:   Admission Orders (From admission, onward)     Ordered        09/22/22 [facility] [address]  Once                        Discharge diagnosis:    Acute on chronic congestive heart failure with systolic and diastolic dysfunction  Cardiorenal syndrome  New onset atrial fibrillation with RVR  Acute hypoxic respiratory failure secondary to above  Nonischemic cardiomyopathy with EF of 25%  Severe COPD  Anemia of chronic disease  Acute metabolic encephalopathy  Comfort care      Admitting Diagnosis: Dermatitis [L30 9]  CHF (congestive heart failure) (HCC) [I50 9]  SOB (shortness of breath) [R06 02]  COPD exacerbation (HCC) [J44 1]  Hypersensitivity reaction, initial encounter [T78 40XA]      Procedures Performed:   Orders Placed This Encounter   Procedures    ED ECG Documentation Only       Summary of Hospital Course: Patient presents with shortness of breath  Patient has multiple comorbid conditions such as CKD, HTN, stroke with aphasia, CHF, COPD, aplastic anemia  2 weeks ago patient has arnesp infusion for aplastic anemia  Used inhaler with no relief  In the ED he was found to be in atrial fibrillation, hypoxic, chf exacerbation  Patient was started on IV lasix, neb treatment, supplemental oxygen, IV lopressor  Cardiology and nephrology consultations were obtained as patient was in HF and CKD was worsening  Patient required BIPAP secondary to increased work of breathing  As the time progressed patient became more dyspneic and heart failure worsened  Patient was going into cardiogenic shock and cardiorenal syndrome  Critical care consulted and on 9/23/22 night team discussed with patient wife and informed her of worsening medical condition ans offered ICU management with inotrpic support   Patient wife understood the complexity and as the patient had poor prognosis wanted him to be comfortable and elected comfort measures  Subsequently patient  on 22  Significant Findings, Care, Treatment and Services Provided: as mentioned above    Complications: as mentioned above      Medical Problems             Resolved Problems  Date Reviewed: 2022          Resolved    Dyspnea 2022     Resolved by  Karlos Marcelo MD                Condition at Discharge: critical     Date, Time and Cause of Death    Date of Death: 22  Time of Death:  8:30 PM  Preliminary Cause of Death: Death due to natural causes         Discharge instructions/Information to patient and family:   See after visit summary for information provided to patient and family  Provisions for Follow-Up Care:  See after visit summary for information related to follow-up care and any pertinent home health orders        PCP: Namita Robison DO    Disposition: patient           Discharge Medications:  Patient

## 2022-12-15 NOTE — PROGRESS NOTES
Cardiology   Judy Tamez 78 y o  male MRN: [id number]        Impression:  1  Non-ischemic cardiomyopathy - patient with NYHA class III systolic heart failure  EF 10%  Not interested in ICD/BiV - even if would improve symptoms   Does not want to have any major intervention   Appears more compensated at this time      2  Hypertension - controlled  3  Tobacco usage - quit  4  COPD - started on Ellipita     5  Word finding difficulties - Likely from CVA         Recommendations:  1  Continue current medications  2  Check echo to evaluate improvement in LV systolic function  3  Follow up in 6 months        HPI: Judy Tamez is a 78y o  year old male with non-ischemic cardiomyopathy returns for follow up after a year   He saw Dr Angela Decker for advanced heart failure - not interested in palliative care at this point    Currently having aphasia   Seeing speech therapy  Has dyspnea with significant exertion  No chest pain or palpitations  Recently diagnosed with aplastic anemia  Review of Systems   Constitutional: Negative  HENT: Negative  Eyes: Negative  Respiratory: Positive for shortness of breath  Negative for chest tightness  Cardiovascular: Negative for chest pain, palpitations and leg swelling  Gastrointestinal: Negative  Endocrine: Negative  Genitourinary: Negative  Musculoskeletal: Negative  Skin: Negative  Allergic/Immunologic: Negative  Neurological:        Word finding difficulty   Hematological: Negative  Psychiatric/Behavioral: Negative  All other systems reviewed and are negative          Past Medical History:   Diagnosis Date    Cardiomyopathy [facility])     CHF (congestive heart failure) (HCC)     COPD (chronic obstructive pulmonary disease) (HCC)     Dyspnea on exertion     Essential hypertension     Non-ischemic cardiomyopathy (HCC)      Past Surgical History:   Procedure Laterality Date    IR BIOPSY BONE MARROW  5/26/2021    PILONIDAL CYST Patient visible in milieu. Attended wrap up group. Anxious. Cooperative with staff and treatment. Patient denies suicidal or homicidal ideations. Denies auditory or visual hallucinations. COVID screen complete and no concerns at this time. Masking and social distancing maintained.      Psychotropic medication compliance: yes    Psychotropic medication side effects:denies    Psychotropic PRNs given:Atarax and Trazodone at bedtime. Atarax at 0300    Appetite and food intake: ate HS snack    Sleep hours (if applicable): 3.25   EXCISION       Social History     Substance and Sexual Activity   Alcohol Use Not Currently    Alcohol/week: 2 0 standard drinks    Types: 2 Cans of beer per week    Comment: occasion     Social History     Substance and Sexual Activity   Drug Use No     Social History     Tobacco Use   Smoking Status Former Smoker    Packs/day: 1 00    Years: 60 00    Pack years: 60 00    Types: Cigarettes    Quit date: 2018    Years since quitting: 3 5   Smokeless Tobacco Never Used   Tobacco Comment    Two cigarettes a day with lunch and dinner      Family History   Problem Relation Age of Onset    Breast cancer Mother 55    Hypertension Father     Cancer Father     Cancer Family     Heart disease Maternal Uncle     Coronary artery disease Maternal Uncle     Heart failure Maternal Uncle     Breast cancer Sister 48    Heart attack Neg Hx     Stroke Neg Hx     Anuerysm Neg Hx     Arrhythmia Neg Hx     Clotting disorder Neg Hx     Hyperlipidemia Neg Hx        Allergies:  No Known Allergies    Medications:     Current Outpatient Medications:     aspirin (ECOTRIN LOW STRENGTH) 81 mg EC tablet, Take 81 mg by mouth daily, Disp: , Rfl:     atorvastatin (LIPITOR) 20 mg tablet, TAKE 1 TABLET BY MOUTH IN  THE EVENING, Disp: 90 tablet, Rfl: 3    bisoprolol (ZEBETA) 5 mg tablet, Take 0 5 tablets (2 5 mg total) by mouth 2 (two) times a day, Disp: 90 tablet, Rfl: 3    furosemide (LASIX) 20 mg tablet, TAKE 2 TABLETS BY MOUTH  EVERY MORNING AND 1 TABLET  BY MOUTH IN THE AFTERNOON  FOR TOTAL OF 60MG PER DAY, Disp: 270 tablet, Rfl: 3    Incruse Ellipta 62 5 MCG/INH AEPB inhaler, USE 1 PUFF BY MOUTH DAILY, Disp: 90 each, Rfl: 3    sacubitril-valsartan (Entresto)  MG TABS, Take 1 tablet by mouth 2 (two) times a day, Disp: 180 tablet, Rfl: 3    Ventolin  (90 Base) MCG/ACT inhaler, USE 2 INHALATIONS BY MOUTH  EVERY 6 HOURS AS NEEDED FOR WHEEZING, Disp: 54 g, Rfl: 3      Wt Readings from Last 3 Encounters: 07/13/21 73 kg (161 lb)   06/09/21 74 8 kg (165 lb)   05/26/21 75 8 kg (167 lb)     Temp Readings from Last 3 Encounters:   06/09/21 97 9 °F (36 6 °C) (Temporal)   05/26/21 97 8 °F (36 6 °C)   05/04/21 (!) 97 1 °F (36 2 °C) (Temporal)     BP Readings from Last 3 Encounters:   07/13/21 110/58   06/09/21 128/66   05/26/21 101/52     Pulse Readings from Last 3 Encounters:   07/13/21 61   06/09/21 64   05/26/21 56         Physical Exam  HENT:      Head: Atraumatic  Mouth/Throat:      Mouth: Mucous membranes are moist    Eyes:      Extraocular Movements: Extraocular movements intact  Cardiovascular:      Rate and Rhythm: Normal rate and regular rhythm  Heart sounds: Normal heart sounds  Pulmonary:      Effort: Pulmonary effort is normal       Breath sounds: Normal breath sounds  Abdominal:      General: Abdomen is flat  Musculoskeletal:         General: Normal range of motion  Cervical back: Normal range of motion  Skin:     General: Skin is warm  Neurological:      Mental Status: He is alert  Mental status is at baseline        Comments: aphasia   Psychiatric:         Mood and Affect: Mood normal            Laboratory Studies:  CMP:  Lab Results   Component Value Date     10/22/2015    K 4 9 04/20/2021     04/20/2021    CO2 27 04/20/2021    ANIONGAP 3 (L) 10/22/2015    BUN 35 (H) 04/20/2021    CREATININE 1 63 (H) 04/20/2021    GLUCOSE 100 10/22/2015    AST 14 04/20/2021    ALT 16 04/20/2021    BILITOT 0 69 10/22/2015    EGFR 41 04/20/2021       Lipid Profile:   Lab Results   Component Value Date    CHOL 190 10/22/2015     Lab Results   Component Value Date    HDL 98 04/20/2021     Lab Results   Component Value Date    LDLCALC 71 04/20/2021     Lab Results   Component Value Date    TRIG 50 04/20/2021       Cardiac testing:   EKG reviewed personally: NSR 61 LBBB  Results for orders placed during the hospital encounter of 01/23/19    Echo complete with contrast if indicated    Narrative  [facility], [address] [facility]  [phone]    Transthoracic Echocardiogram  2D, M-mode, Doppler, and Color Doppler    Study date:  2019    Patient: Aaron Pantoja  MR number: [id number]  Account number: [de-identified]  : 1945  Age: 69 years  Gender: Male  Status: Outpatient  Location: [address] [place] [address]  Height: 68 in  Weight: 164 6 lb  BP: 110/ 60 mmHg    Indications: Assess left ventricular function  Diagnoses: R06 02 - Shortness of breath    Sonographer:  LENIN Lugo  Primary Physician:  Claire Cifuentes DO  Referring Physician:  Andrew Randolph MD  Group:  [id number]  Interpreting Physician:  Laura Mathis MD    SUMMARY    LEFT VENTRICLE:  The ventricle was dilated  Systolic function was severely reduced  Ejection fraction was estimated to be 10 %  There was severe diffuse hypokinesis  Doppler parameters were consistent with restrictive physiology, indicative of decreased left ventricular diastolic compliance and/or increased left atrial pressure  Doppler parameters were consistent with high ventricular filling pressure  RIGHT VENTRICLE:  The ventricle was dilated  LEFT ATRIUM:  The atrium was dilated  RIGHT ATRIUM:  The atrium was dilated  MITRAL VALVE:  There was mild to moderate regurgitation  TRICUSPID VALVE:  There was moderate regurgitation  Estimated peak PA pressure was 51 mmHg  The findings suggest mild to moderate pulmonary hypertension  PULMONIC VALVE:  There was mild regurgitation  AORTA:  The root exhibited normal size and mild fibrocalcific change  HISTORY: PRIOR HISTORY: Cardiomyopathy, COPD, Dyspnea of exertion, CVA, Tobacco abuse    PROCEDURE: The study was performed in the [address] [place] [address]  This was a routine study  The transthoracic approach was used   The study included complete 2D imaging, M-mode, complete spectral Doppler, and color Doppler  The  heart rate was 110 bpm, at the start of the study  Images were obtained from the parasternal, apical, subcostal, and suprasternal notch acoustic windows  Image quality was adequate  LEFT VENTRICLE: The ventricle was dilated  Systolic function was severely reduced  Ejection fraction was estimated to be 10 %  There was severe diffuse hypokinesis  Wall thickness was normal  DOPPLER: There was a reduced contribution of  atrial contraction to ventricular filling, due to increased ventricular diastolic pressure or atrial contractile dysfunction  Doppler parameters were consistent with restrictive physiology, indicative of decreased left ventricular  diastolic compliance and/or increased left atrial pressure  Doppler parameters were consistent with high ventricular filling pressure  RIGHT VENTRICLE: The ventricle was dilated  Systolic function was normal  Wall thickness was normal     LEFT ATRIUM: The atrium was dilated  RIGHT ATRIUM: The atrium was dilated  MITRAL VALVE: Valve structure was normal  There was normal leaflet separation  DOPPLER: The transmitral velocity was within the normal range  There was no evidence for stenosis  There was mild to moderate regurgitation  AORTIC VALVE: The valve was trileaflet  Leaflets exhibited mild calcification, normal cuspal separation, and sclerosis  DOPPLER: Transaortic velocity was within the normal range  There was no evidence for stenosis  There was trace  regurgitation  TRICUSPID VALVE: The valve structure was normal  There was normal leaflet separation  DOPPLER: The transtricuspid velocity was within the normal range  There was no evidence for stenosis  There was moderate regurgitation  Estimated peak PA  pressure was 51 mmHg  The findings suggest mild to moderate pulmonary hypertension  PULMONIC VALVE: Leaflets exhibited normal thickness, no calcification, and normal cuspal separation   DOPPLER: The transpulmonic velocity was within the normal range  There was mild regurgitation  PERICARDIUM: There was no pericardial effusion  The pericardium was normal in appearance  AORTA: The root exhibited normal size and mild fibrocalcific change  SYSTEMIC VEINS: IVC: The inferior vena cava was normal in size   Respirophasic changes were normal     SYSTEM MEASUREMENT TABLES    2D  %FS: 9 09 %  AV Diam: 3 56 cm  EDV(Teich): 230 97 ml  EF(Cube): 24 87 %  EF(Teich): 19 48 %  ESV(Cube): 225 46 ml  ESV(Teich): 185 97 ml  IVSd: 0 8 cm  LA Area: 36 38 cm2  LA Diam: 4 67 cm  LVEDV MOD A4C: 209 89 ml  LVEF MOD A4C: 18 83 %  LVESV MOD A4C: 170 37 ml  LVIDd: 6 7 cm  LVIDs: 6 09 cm  LVLd A4C: 9 3 cm  LVLs A4C: 8 79 cm  LVPWd: 0 82 cm  RA Area: 25 5 cm2  RV Diam: 5 17 cm  SI(Cube): 39 49 ml/m2  SI(Teich): 23 81 ml/m2  SV MOD A4C: 39 51 ml  SV(Cube): 74 64 ml  SV(Teich): 45 ml    CW  TR MaxP 49 mmHg  TR Vmax: 3 29 m/s    MM  TAPSE: 2 73 cm    PW  AVC: 406 66 ms    [facility] Accredited Echocardiography Laboratory    Prepared and electronically signed by    Gavin Goncalves MD  Signed 2019 14:56:03